# Patient Record
Sex: MALE | Race: OTHER | NOT HISPANIC OR LATINO | URBAN - METROPOLITAN AREA
[De-identification: names, ages, dates, MRNs, and addresses within clinical notes are randomized per-mention and may not be internally consistent; named-entity substitution may affect disease eponyms.]

---

## 2023-10-26 ENCOUNTER — EMERGENCY (EMERGENCY)
Facility: HOSPITAL | Age: 38
LOS: 1 days | Discharge: HOME | End: 2023-10-26
Admitting: EMERGENCY MEDICINE
Payer: MEDICAID

## 2023-10-26 VITALS
HEART RATE: 68 BPM | TEMPERATURE: 99 F | RESPIRATION RATE: 16 BRPM | SYSTOLIC BLOOD PRESSURE: 129 MMHG | OXYGEN SATURATION: 100 % | DIASTOLIC BLOOD PRESSURE: 91 MMHG

## 2023-10-26 VITALS
OXYGEN SATURATION: 97 % | TEMPERATURE: 99 F | HEART RATE: 73 BPM | SYSTOLIC BLOOD PRESSURE: 128 MMHG | DIASTOLIC BLOOD PRESSURE: 89 MMHG | RESPIRATION RATE: 16 BRPM

## 2023-10-26 DIAGNOSIS — Z94.0 KIDNEY TRANSPLANT STATUS: Chronic | ICD-10-CM

## 2023-10-26 LAB
ALBUMIN SERPL ELPH-MCNC: 4.1 G/DL — SIGNIFICANT CHANGE UP (ref 3.3–5)
ALBUMIN SERPL ELPH-MCNC: 4.1 G/DL — SIGNIFICANT CHANGE UP (ref 3.3–5)
ALP SERPL-CCNC: 87 U/L — SIGNIFICANT CHANGE UP (ref 40–120)
ALP SERPL-CCNC: 87 U/L — SIGNIFICANT CHANGE UP (ref 40–120)
ALT FLD-CCNC: 240 U/L — HIGH (ref 4–41)
ALT FLD-CCNC: 240 U/L — HIGH (ref 4–41)
ANION GAP SERPL CALC-SCNC: 10 MMOL/L — SIGNIFICANT CHANGE UP (ref 7–14)
ANION GAP SERPL CALC-SCNC: 10 MMOL/L — SIGNIFICANT CHANGE UP (ref 7–14)
AST SERPL-CCNC: 113 U/L — HIGH (ref 4–40)
AST SERPL-CCNC: 113 U/L — HIGH (ref 4–40)
BASE EXCESS BLDV CALC-SCNC: -0.5 MMOL/L — SIGNIFICANT CHANGE UP (ref -2–3)
BASE EXCESS BLDV CALC-SCNC: -0.5 MMOL/L — SIGNIFICANT CHANGE UP (ref -2–3)
BASOPHILS # BLD AUTO: 0.01 K/UL — SIGNIFICANT CHANGE UP (ref 0–0.2)
BASOPHILS # BLD AUTO: 0.01 K/UL — SIGNIFICANT CHANGE UP (ref 0–0.2)
BASOPHILS NFR BLD AUTO: 0.2 % — SIGNIFICANT CHANGE UP (ref 0–2)
BASOPHILS NFR BLD AUTO: 0.2 % — SIGNIFICANT CHANGE UP (ref 0–2)
BILIRUB SERPL-MCNC: 0.5 MG/DL — SIGNIFICANT CHANGE UP (ref 0.2–1.2)
BILIRUB SERPL-MCNC: 0.5 MG/DL — SIGNIFICANT CHANGE UP (ref 0.2–1.2)
BLOOD GAS VENOUS COMPREHENSIVE RESULT: SIGNIFICANT CHANGE UP
BLOOD GAS VENOUS COMPREHENSIVE RESULT: SIGNIFICANT CHANGE UP
BUN SERPL-MCNC: 28 MG/DL — HIGH (ref 7–23)
BUN SERPL-MCNC: 28 MG/DL — HIGH (ref 7–23)
CALCIUM SERPL-MCNC: 10.7 MG/DL — HIGH (ref 8.4–10.5)
CALCIUM SERPL-MCNC: 10.7 MG/DL — HIGH (ref 8.4–10.5)
CHLORIDE BLDV-SCNC: 102 MMOL/L — SIGNIFICANT CHANGE UP (ref 96–108)
CHLORIDE BLDV-SCNC: 102 MMOL/L — SIGNIFICANT CHANGE UP (ref 96–108)
CHLORIDE SERPL-SCNC: 104 MMOL/L — SIGNIFICANT CHANGE UP (ref 98–107)
CHLORIDE SERPL-SCNC: 104 MMOL/L — SIGNIFICANT CHANGE UP (ref 98–107)
CO2 BLDV-SCNC: 26.8 MMOL/L — HIGH (ref 22–26)
CO2 BLDV-SCNC: 26.8 MMOL/L — HIGH (ref 22–26)
CO2 SERPL-SCNC: 23 MMOL/L — SIGNIFICANT CHANGE UP (ref 22–31)
CO2 SERPL-SCNC: 23 MMOL/L — SIGNIFICANT CHANGE UP (ref 22–31)
CREAT SERPL-MCNC: 0.98 MG/DL — SIGNIFICANT CHANGE UP (ref 0.5–1.3)
CREAT SERPL-MCNC: 0.98 MG/DL — SIGNIFICANT CHANGE UP (ref 0.5–1.3)
EGFR: 102 ML/MIN/1.73M2 — SIGNIFICANT CHANGE UP
EGFR: 102 ML/MIN/1.73M2 — SIGNIFICANT CHANGE UP
EOSINOPHIL # BLD AUTO: 0.03 K/UL — SIGNIFICANT CHANGE UP (ref 0–0.5)
EOSINOPHIL # BLD AUTO: 0.03 K/UL — SIGNIFICANT CHANGE UP (ref 0–0.5)
EOSINOPHIL NFR BLD AUTO: 0.5 % — SIGNIFICANT CHANGE UP (ref 0–6)
EOSINOPHIL NFR BLD AUTO: 0.5 % — SIGNIFICANT CHANGE UP (ref 0–6)
GAS PNL BLDV: 135 MMOL/L — LOW (ref 136–145)
GAS PNL BLDV: 135 MMOL/L — LOW (ref 136–145)
GLUCOSE BLDV-MCNC: 256 MG/DL — HIGH (ref 70–99)
GLUCOSE BLDV-MCNC: 256 MG/DL — HIGH (ref 70–99)
GLUCOSE SERPL-MCNC: 260 MG/DL — HIGH (ref 70–99)
GLUCOSE SERPL-MCNC: 260 MG/DL — HIGH (ref 70–99)
HCO3 BLDV-SCNC: 25 MMOL/L — SIGNIFICANT CHANGE UP (ref 22–29)
HCO3 BLDV-SCNC: 25 MMOL/L — SIGNIFICANT CHANGE UP (ref 22–29)
HCT VFR BLD CALC: 45.7 % — SIGNIFICANT CHANGE UP (ref 39–50)
HCT VFR BLD CALC: 45.7 % — SIGNIFICANT CHANGE UP (ref 39–50)
HCT VFR BLDA CALC: 47 % — SIGNIFICANT CHANGE UP (ref 39–51)
HCT VFR BLDA CALC: 47 % — SIGNIFICANT CHANGE UP (ref 39–51)
HGB BLD CALC-MCNC: 15.5 G/DL — SIGNIFICANT CHANGE UP (ref 12.6–17.4)
HGB BLD CALC-MCNC: 15.5 G/DL — SIGNIFICANT CHANGE UP (ref 12.6–17.4)
HGB BLD-MCNC: 15.3 G/DL — SIGNIFICANT CHANGE UP (ref 13–17)
HGB BLD-MCNC: 15.3 G/DL — SIGNIFICANT CHANGE UP (ref 13–17)
IANC: 3.64 K/UL — SIGNIFICANT CHANGE UP (ref 1.8–7.4)
IANC: 3.64 K/UL — SIGNIFICANT CHANGE UP (ref 1.8–7.4)
IMM GRANULOCYTES NFR BLD AUTO: 0.5 % — SIGNIFICANT CHANGE UP (ref 0–0.9)
IMM GRANULOCYTES NFR BLD AUTO: 0.5 % — SIGNIFICANT CHANGE UP (ref 0–0.9)
LACTATE BLDV-MCNC: 1.4 MMOL/L — SIGNIFICANT CHANGE UP (ref 0.5–2)
LACTATE BLDV-MCNC: 1.4 MMOL/L — SIGNIFICANT CHANGE UP (ref 0.5–2)
LYMPHOCYTES # BLD AUTO: 1.14 K/UL — SIGNIFICANT CHANGE UP (ref 1–3.3)
LYMPHOCYTES # BLD AUTO: 1.14 K/UL — SIGNIFICANT CHANGE UP (ref 1–3.3)
LYMPHOCYTES # BLD AUTO: 20.1 % — SIGNIFICANT CHANGE UP (ref 13–44)
LYMPHOCYTES # BLD AUTO: 20.1 % — SIGNIFICANT CHANGE UP (ref 13–44)
MCHC RBC-ENTMCNC: 31.7 PG — SIGNIFICANT CHANGE UP (ref 27–34)
MCHC RBC-ENTMCNC: 31.7 PG — SIGNIFICANT CHANGE UP (ref 27–34)
MCHC RBC-ENTMCNC: 33.5 GM/DL — SIGNIFICANT CHANGE UP (ref 32–36)
MCHC RBC-ENTMCNC: 33.5 GM/DL — SIGNIFICANT CHANGE UP (ref 32–36)
MCV RBC AUTO: 94.8 FL — SIGNIFICANT CHANGE UP (ref 80–100)
MCV RBC AUTO: 94.8 FL — SIGNIFICANT CHANGE UP (ref 80–100)
MONOCYTES # BLD AUTO: 0.82 K/UL — SIGNIFICANT CHANGE UP (ref 0–0.9)
MONOCYTES # BLD AUTO: 0.82 K/UL — SIGNIFICANT CHANGE UP (ref 0–0.9)
MONOCYTES NFR BLD AUTO: 14.5 % — HIGH (ref 2–14)
MONOCYTES NFR BLD AUTO: 14.5 % — HIGH (ref 2–14)
NEUTROPHILS # BLD AUTO: 3.64 K/UL — SIGNIFICANT CHANGE UP (ref 1.8–7.4)
NEUTROPHILS # BLD AUTO: 3.64 K/UL — SIGNIFICANT CHANGE UP (ref 1.8–7.4)
NEUTROPHILS NFR BLD AUTO: 64.2 % — SIGNIFICANT CHANGE UP (ref 43–77)
NEUTROPHILS NFR BLD AUTO: 64.2 % — SIGNIFICANT CHANGE UP (ref 43–77)
NRBC # BLD: 0 /100 WBCS — SIGNIFICANT CHANGE UP (ref 0–0)
NRBC # BLD: 0 /100 WBCS — SIGNIFICANT CHANGE UP (ref 0–0)
NRBC # FLD: 0 K/UL — SIGNIFICANT CHANGE UP (ref 0–0)
NRBC # FLD: 0 K/UL — SIGNIFICANT CHANGE UP (ref 0–0)
PCO2 BLDV: 45 MMHG — SIGNIFICANT CHANGE UP (ref 42–55)
PCO2 BLDV: 45 MMHG — SIGNIFICANT CHANGE UP (ref 42–55)
PH BLDV: 7.36 — SIGNIFICANT CHANGE UP (ref 7.32–7.43)
PH BLDV: 7.36 — SIGNIFICANT CHANGE UP (ref 7.32–7.43)
PLATELET # BLD AUTO: 141 K/UL — LOW (ref 150–400)
PLATELET # BLD AUTO: 141 K/UL — LOW (ref 150–400)
PO2 BLDV: 43 MMHG — SIGNIFICANT CHANGE UP (ref 25–45)
PO2 BLDV: 43 MMHG — SIGNIFICANT CHANGE UP (ref 25–45)
POTASSIUM BLDV-SCNC: 4.2 MMOL/L — SIGNIFICANT CHANGE UP (ref 3.5–5.1)
POTASSIUM BLDV-SCNC: 4.2 MMOL/L — SIGNIFICANT CHANGE UP (ref 3.5–5.1)
POTASSIUM SERPL-MCNC: 4 MMOL/L — SIGNIFICANT CHANGE UP (ref 3.5–5.3)
POTASSIUM SERPL-MCNC: 4 MMOL/L — SIGNIFICANT CHANGE UP (ref 3.5–5.3)
POTASSIUM SERPL-SCNC: 4 MMOL/L — SIGNIFICANT CHANGE UP (ref 3.5–5.3)
POTASSIUM SERPL-SCNC: 4 MMOL/L — SIGNIFICANT CHANGE UP (ref 3.5–5.3)
PROT SERPL-MCNC: 7 G/DL — SIGNIFICANT CHANGE UP (ref 6–8.3)
PROT SERPL-MCNC: 7 G/DL — SIGNIFICANT CHANGE UP (ref 6–8.3)
RBC # BLD: 4.82 M/UL — SIGNIFICANT CHANGE UP (ref 4.2–5.8)
RBC # BLD: 4.82 M/UL — SIGNIFICANT CHANGE UP (ref 4.2–5.8)
RBC # FLD: 12.9 % — SIGNIFICANT CHANGE UP (ref 10.3–14.5)
RBC # FLD: 12.9 % — SIGNIFICANT CHANGE UP (ref 10.3–14.5)
SAO2 % BLDV: 74 % — SIGNIFICANT CHANGE UP (ref 67–88)
SAO2 % BLDV: 74 % — SIGNIFICANT CHANGE UP (ref 67–88)
SODIUM SERPL-SCNC: 137 MMOL/L — SIGNIFICANT CHANGE UP (ref 135–145)
SODIUM SERPL-SCNC: 137 MMOL/L — SIGNIFICANT CHANGE UP (ref 135–145)
WBC # BLD: 5.67 K/UL — SIGNIFICANT CHANGE UP (ref 3.8–10.5)
WBC # BLD: 5.67 K/UL — SIGNIFICANT CHANGE UP (ref 3.8–10.5)
WBC # FLD AUTO: 5.67 K/UL — SIGNIFICANT CHANGE UP (ref 3.8–10.5)
WBC # FLD AUTO: 5.67 K/UL — SIGNIFICANT CHANGE UP (ref 3.8–10.5)

## 2023-10-26 PROCEDURE — 73562 X-RAY EXAM OF KNEE 3: CPT | Mod: 26,LT

## 2023-10-26 PROCEDURE — 99284 EMERGENCY DEPT VISIT MOD MDM: CPT

## 2023-10-26 RX ORDER — CEPHALEXIN 500 MG
500 CAPSULE ORAL EVERY 12 HOURS
Refills: 0 | Status: DISCONTINUED | OUTPATIENT
Start: 2023-10-26 | End: 2023-10-30

## 2023-10-26 RX ORDER — CEPHALEXIN 500 MG
1 CAPSULE ORAL
Qty: 14 | Refills: 0
Start: 2023-10-26 | End: 2023-11-01

## 2023-10-26 RX ADMIN — Medication 500 MILLIGRAM(S): at 18:15

## 2023-10-26 NOTE — ED PROVIDER NOTE - CLINICAL SUMMARY MEDICAL DECISION MAKING FREE TEXT BOX
39 y/o M with pmhx of DM (on insulin glulisine), ESRD s/p kidney transplant 3 years ago who presents to the ED c/o atraumatic R knee pain x 4 days. Pt reports gradual onset worsening knee pain that is alleviated with rest and aggravated with movement and walking. Pt tried taking tylenol for pain control with little relief of pain. Denies fever/chills, recent trauma or falls, extremity numbness/tingling, similar episodes of pain in the past, difficulty with ambulation or movement of the knee, pain in any other joints, rash.    Patient currently afebrile, hemodynamically stable, spO2 100%. Physical exam findings: Inferior aspect of R knee with tenderness, overlying erythema, warmth and swelling. Full active and passive ROM. Neurovascularly intact. Otherwise unremarkable. FS noted to be 384 on arrival, pt states he ate just prior to coming to the ED and took his glulisine insulin while waiting in the ED.  Based on history and physical, differentials include but are not limited to gout vs knee effusion vs OA. Plan to assess patient for acute pathology as listed above with XR. Repeat FS to ensure downtrending glucose level. Will administer medications for symptomatic relief, follow-up on results, and reassess. 39 y/o M with pmhx of DM (on insulin glulisine), ESRD s/p kidney transplant 3 years ago who presents to the ED c/o atraumatic R knee pain x 4 days. Pt reports gradual onset worsening knee pain that is alleviated with rest and aggravated with movement and walking. Pt tried taking tylenol for pain control with little relief of pain. Denies fever/chills, recent trauma or falls, extremity numbness/tingling, similar episodes of pain in the past, difficulty with ambulation or movement of the knee, pain in any other joints, rash.    Patient currently afebrile, hemodynamically stable, spO2 100%. Physical exam findings: Inferior aspect of R knee with tenderness, overlying erythema, warmth and swelling. Full active and passive ROM. Neurovascularly intact. Otherwise unremarkable. FS noted to be 384 on arrival, pt states he ate just prior to coming to the ED and took his glulisine insulin while waiting in the ED.  Based on history and physical, differentials include but are not limited to gout vs cellulitis vs knee effusion vs OA. Plan to assess patient for acute pathology as listed above with XR, labs given hx of kidney ds and elevated FS. Will administer medications for symptomatic relief, follow-up on results, and reassess. Consider abx for cellulitis ddx. 39 y/o M with pmhx of DM (on insulin glulisine), ESRD s/p kidney transplant 3 years ago who presents to the ED c/o atraumatic R knee pain x 4 days. Pt reports gradual onset worsening knee pain that is alleviated with rest and aggravated with movement and walking. Pt tried taking tylenol for pain control with little relief of pain. Denies fever/chills, recent trauma or falls, extremity numbness/tingling, similar episodes of pain in the past, difficulty with ambulation or movement of the knee, pain in any other joints, rash.    Patient currently afebrile, hemodynamically stable, spO2 100%. Physical exam findings: Inferior aspect of R knee with tenderness, overlying erythema, warmth and swelling. Full active and passive ROM. Neurovascularly intact. Otherwise unremarkable. FS noted to be 384 on arrival, pt states he ate just prior to coming to the ED and took his glulisine insulin while waiting in the ED.  Based on history and physical, differentials include but are not limited to gout vs cellulitis vs knee effusion vs OA. Plan to assess patient for acute pathology as listed above with XR, labs given hx of kidney ds and elevated FS. Will administer medications for symptomatic relief, follow-up on results, and reassess. Consider abx for cellulitis ddx.    SARANYA Lora:  I have personally seen and examined this patient.  I have fully participated in the care of this patient. I have reviewed all pertinent clinical information, including history, physical exam, plan and PA fellow's note and agree as noted.

## 2023-10-26 NOTE — ED PROVIDER NOTE - NSFOLLOWUPINSTRUCTIONS_ED_ALL_ED_FT
You were seen in the ED for knee pain. Your symptoms are most likely related to cellulitis, an infection of the skin.     Please take Keflex (cephalexin) one tablet twice a day for the next 7 days. You received one dose in the ED. Please start taking tomorrow. Take Tylenol 650mg (Two 325 mg pills) every 4-6 hours as needed for pain or fevers.     Please follow-up with your primary care doctor - bring copies of your results from today's visit.     Continue all previously prescribed medications as directed unless otherwise instructed.      Return to the ER for worsening or persistent symptoms, and/or ANY NEW OR CONCERNING SYMPTOMS including fever/chills, worsening pain or swelling in the knee, an inability to walk, numbness or tingling, nausea/vomiting.     Cellulitis  WHAT YOU NEED TO KNOW:  Cellulitis is a skin infection caused by bacteria. Cellulitis may go away on its own or you may need treatment. Your healthcare provider may draw a Jackson around the outside edges of your cellulitis. If your cellulitis spreads, your healthcare provider will see it outside of the Jackson.   Cellulitis    DISCHARGE INSTRUCTIONS:  Call 911 if:   •You have sudden trouble breathing or chest pain.  Seek care immediately if:   •Your wound gets larger and more painful.   •You feel a crackling under your skin when you touch it.  •You have purple dots or bumps on your skin, or you see bleeding under your skin.  •You have new swelling and pain in your legs.  •The red, warm, swollen area gets larger.  •You see red streaks coming from the infected area.  Contact your healthcare provider if:   •You have a fever.  •Your fever or pain does not go away or gets worse.  •The area does not get smaller after 2 days of antibiotics.  •Your skin is flaking or peeling off.  •You have questions or concerns about your condition or care.  Medicines:   •Antibiotics help treat the bacterial infection.   •NSAIDs, such as ibuprofen, help decrease swelling, pain, and fever. NSAIDs can cause stomach bleeding or kidney problems in certain people. If you take blood thinner medicine, always ask if NSAIDs are safe for you. Always read the medicine label and follow directions. Do not give these medicines to children under 6 months of age without direction from your child's healthcare provider.  •Acetaminophen decreases pain and fever. It is available without a doctor's order. Ask how much to take and how often to take it. Follow directions. Read the labels of all other medicines you are using to see if they also contain acetaminophen, or ask your doctor or pharmacist. Acetaminophen can cause liver damage if not taken correctly. Do not use more than 4 grams (4,000 milligrams) total of acetaminophen in one day.   •Take your medicine as directed. Contact your healthcare provider if you think your medicine is not helping or if you have side effects. Tell him or her if you are allergic to any medicine. Keep a list of the medicines, vitamins, and herbs you take. Include the amounts, and when and why you take them. Bring the list or the pill bottles to follow-up visits. Carry your medicine list with you in case of an emergency.  Self-care:   •Elevate the area above the level of your heart as often as you can. This will help decrease swelling and pain. Prop the area on pillows or blankets to keep it elevated comfortably.   •Clean the area daily until the wound scabs over. Gently wash the area with soap and water. Pat dry. Use dressings as directed.   •Place cool or warm, wet cloths on the area as directed. Use clean cloths and clean water. Leave it on the area until the cloth is room temperature. Pat the area dry with a clean, dry cloth. The cloths may help decrease pain.   Prevent cellulitis:   •Do not scratch bug bites or areas of injury. You increase your risk for cellulitis by scratching these areas.   •Do not share personal items, such as towels, clothing, and razors.   •Clean exercise equipment with germ-killing  before and after you use it.  •Wash your hands often. Use soap and water. Wash your hands after you use the bathroom, change a child's diapers, or sneeze. Wash your hands before you prepare or eat food. Use lotion to prevent dry, cracked skin.   Handwashing   •Wear pressure stockings as directed. You may be told to wear the stockings if you have peripheral edema. The stockings improve blood flow and decrease swelling.  •Treat athlete’s foot. This can help prevent the spread of a bacterial skin infection.  Follow up with your healthcare provider within 3 days, or as directed: Your healthcare provider will check if your cellulitis is getting better. You may need different medicine. Write down your questions so you remember to ask them during your visits.  LO QUE NECESITA SABER:  La celulitis es shahid infección en la piel causada por bacteria. La celulitis podría desaparecer por sí sachi o puede que necesite tratamiento. Frank médico puede dibujar un círculo alrededor de los bordes externos de frank celulitis. Si la celulitis se extiende, frakn médico verá que se salió del círculo.   Celulitis   [facility]STRUCCIONES SOBRE [facility]:  Llame al 911 si:  •Tiene dolor en el pecho o dificultad repentina para respirar.  Busque atención médica de inmediato si:  •Frank herida se engrandece o tiene más dolor.  •Usted siente sonidos crepitantes bajo la piel al tocarla.  •Usted tiene puntos o protuberancias color ramo en frank piel o nota lesa debajo frank piel.  •Usted tiene shahid nueva inflamación o dolor en dian piernas.  •Las áreas enrojecidas, cálidas e inflamadas se hacen más grandes.  •Usted ve líneas vela saliendo del área infectada.  Comuníquese con frank médico si:  •Tiene fiebre.  •Frank fiebre o dolor no desaparecen o empeoran.  •El área no se reduce después de 2 días de uso de antibióticos.  •Frank piel se escama o se pela.  •Usted tiene preguntas o inquietudes acerca de frank condición o   cuidado.Medicamentos:  •Los antibióticossirven para tratar la infección bacterial.  •Los SANJAY,chika el ibuprofeno, ayudan a disminuir la inflamación, el dolor y la fiebre. Los SANJAY pueden causar sangrado estomacal o problemas renales en ciertas personas. Si usted esta tomando un anticoágulante,  siempre pregunte si los AINEs son seguros para usted. Siempre yadira la etiqueta de jeaneth medicamento y siga las instrucciones. No administre jeaneth medicamento a niños menores de 6 meses de alba sin antes obtener la autorización de frank médico.  •Acetaminofénalivia el dolor y baja la fiebre. Está disponible sin receta médica. Pregunte la cantidad y la frecuencia con que debe tomarlos. Siga las indicaciones. Yadira las etiquetas de todos los demás medicamentos que esté usando para saber si también contienen acetaminofén, o pregunte a frank médico o farmacéutico. El acetaminofén puede causar daño en el hígado cuando no se zeynep de forma correcta. No use más de 4 gramos (4000 miligramos) en total de acetaminofeno en un día.  •Spray dian medicamentos chika se le haya indicado.Consulte con frank médico si usted alexandre que frank medicamento no le está ayudando o si presenta efectos secundarios. Infórmele si es alérgico a cualquier medicamento. Mantenga shahid lista actualizada de los medicamentos, las vitaminas y los productos herbales que zeynep. Incluya los siguientes datos de los medicamentos: cantidad, frecuencia y motivo de administración. Traiga con usted la lista o los envases de las píldoras a dian citas de seguimiento. Lleve la lista de los medicamentos con usted en kirstie de shahid emergencia.  Cuidados personales:  •Eleve el área por encima del nivel de frank corazón.con la frecuencia posible. Lignite va a disminuir inflamación y el dolor. Coloque el área sobre almohadas o sábanas para tratar de mantenerla elevada cómodamente.  •Limpie la tristen diariamente hasta que se forme shahid costra sobre la herida.Lave suavemente el área con jabón y agua. Séquela con palmaditas. Use apósitos chika se le haya indicado.  •Coloque paños húmedos fríos o calientes en la tristen chika se le haya indicado.Use paños limpios y agua limpia. Déjelos en el área hasta que el paño llegue a temperatura ambiente. Seque el área con palmaditas con un paño limpio y seco. Los paños pueden ayudar a disminuir el dolor.  Evite la celulitis:  •No se rasque picaduras de insectos o áreas lesionadas.Rascarse estas áreas aumenta frank riesgo de tener celulitis.  •No comparta los artículos de uso personal,chika toallas, ropa, o navajas de afeitar.  •Limpie los equipos de ejerciciocon un detergente desinfectante antes y después de utilizarlo.  •Lávese las brii frecuentemente.Utilice agua y jabón. Lávese las brii después de usar el baño, cambiarle el pañal a un ciara o estornudar. Lávese las brii antes de comer o preparar alimentos. Use shahid loción para evitar que la piel se reseque o se agriete.   Lavado de brii   •Use medias de compresión chika se le indique.Es posible que le recomienden usar las medias si tiene edema periférico. Las medias mejoran el flujo sanguíneo y disminuyen la hinchazón.  •Trate el pie de atleta.Lignite puede ayudar a prevenir la propagación de shahid infección bacteriana en la piel.  Acuda a dian consultas de control con frank médico dentro de 3 días o según le indicaron:Frank médico comprobará si la celulitis está mejorando. Es posible que necesite un medicamento diferente. Anote dian preguntas para que se acuerde de hacerlas bryant dian visitas.

## 2023-10-26 NOTE — ED ADULT NURSE NOTE - OBJECTIVE STATEMENT
pt to wellness, a&ox3 and ambulatory at baseline, c/o R knee pain x4 days, worsening with movement. pt reporting he popped a pimple in area just prior to onset of symptoms. pt denies fevers, chills, n/v/d, chest pain, truama/injury to the area, recent travel, recent infection, sob. NAD noted at this time, respirations even and nonlabored on room air. comfort and safety maintained. labs drawn and sent. pending XR.

## 2023-10-26 NOTE — ED PROVIDER NOTE - PROGRESS NOTE DETAILS
SARANYA Harrison: Workup reviewed. CBC without leukocytosis. CMP with elevated LFTs, however in absence of abd pain, N/V, signs of jaundice, will refer pt to f/u with PCP for further evaluation. SARANYA Harrison: Workup reviewed. CBC without leukocytosis. CMP with elevated LFTs, however in absence of abd pain, N/V, signs of jaundice, will refer pt to f/u with PCP for further evaluation.  XR L knee with trace joint effusion, otherwise unremarkable.   Will give Keflex, observe for one hour after given hx of pcn allergy. If no adverse reaction will d/c with 5 days course of cephalexin and f/u with PCP.

## 2023-10-26 NOTE — ED ADULT TRIAGE NOTE - CHIEF COMPLAINT QUOTE
pt ambulatory c/o right knee pain X 4 days. denies any falls/injuries. past medical history of kidney transplant X 3 years and diabetes. fingerstick 384.

## 2023-10-26 NOTE — ED PROVIDER NOTE - OBJECTIVE STATEMENT
39 y/o M with pmhx of DM (on insulin glulisine), ESRD s/p kidney transplant 3 years ago who presents to the ED c/o R knee pain x 4 days. Pt reports gradual onset worsening knee pain that is alleviated with rest and aggravated with movement and walking. Pt tried taking tylenol for pain control with little relief of pain. Denies fever/chills, recent trauma or falls, extremity numbness/tingling, similar episodes of pain in the past, difficulty with ambulation or movement of the knee, pain in any other joints, rash.  Reports pcn allergy. Denies smoking/etoh/drug use. 37 y/o M with pmhx of DM (on insulin glulisine), ESRD s/p kidney transplant 3 years ago who presents to the ED c/o R knee pain x 4 days. Pt reports gradual onset worsening knee pain that is alleviated with rest and aggravated with movement and walking. Pt tried taking tylenol for pain control with little relief of pain. Pt notes there was a pimple in the area of pain that he popped just prior to sx onset. Denies fever/chills, recent trauma or falls, extremity numbness/tingling, similar episodes of pain in the past, difficulty with ambulation or movement of the knee, pain in any other joints, rash.  Reports pcn allergy. Denies smoking/etoh/drug use.

## 2023-10-26 NOTE — ED PROVIDER NOTE - PHYSICAL EXAMINATION
General: Well appearing in no acute distress, alert and cooperative  Head: Normocephalic, atraumatic  Eyes: PERRLA, no conjunctival injection, no scleral icterus  Neck: Soft and supple  Cardiac: Regular rate and regular rhythm, no murmurs, peripheral pulses 2+ and symmetric in all extremities, no LE edema. No calf tenderness or palpable cords b/l  Resp: Unlabored respiratory effort, lungs CTAB, speaking in full sentences, no wheezes  Abd: Soft, non-tender, non-distended, no guarding or rebound tenderness  MSK: +tenderness to inferior aspect of R knee with overlying erythema, warmth and swelling, no overlying breaks in skin. Full active and passive ROM of R knee. All other extremities without tenderness or obvious deformities.  Skin: Warm and dry, no rashes/abrasions/lacerations except as noted in MSK exam.   Neuro: AO x 3, moves all extremities symmetrically, Motor strength 5/5 bilaterally UE and LE, sensation grossly intact. Normal gait.

## 2023-10-26 NOTE — ED PROVIDER NOTE - PATIENT PORTAL LINK FT
You can access the FollowMyHealth Patient Portal offered by [facility] by registering at the following website: [email or web address]. By joining [facility]’s FollowMyHealth portal, you will also be able to view your health information using other applications (apps) compatible with our system.

## 2024-02-10 ENCOUNTER — INPATIENT (INPATIENT)
Facility: HOSPITAL | Age: 39
LOS: 3 days | Discharge: HOME | End: 2024-02-14
Attending: INTERNAL MEDICINE | Admitting: INTERNAL MEDICINE
Payer: MEDICAID

## 2024-02-10 VITALS
SYSTOLIC BLOOD PRESSURE: 102 MMHG | DIASTOLIC BLOOD PRESSURE: 65 MMHG | HEART RATE: 98 BPM | TEMPERATURE: 99 F | OXYGEN SATURATION: 97 % | RESPIRATION RATE: 16 BRPM

## 2024-02-10 DIAGNOSIS — I10 ESSENTIAL (PRIMARY) HYPERTENSION: ICD-10-CM

## 2024-02-10 DIAGNOSIS — Z94.0 KIDNEY TRANSPLANT STATUS: ICD-10-CM

## 2024-02-10 DIAGNOSIS — E11.10 TYPE 2 DIABETES MELLITUS WITH KETOACIDOSIS WITHOUT COMA: ICD-10-CM

## 2024-02-10 DIAGNOSIS — E78.5 HYPERLIPIDEMIA, UNSPECIFIED: ICD-10-CM

## 2024-02-10 DIAGNOSIS — D84.9 IMMUNODEFICIENCY, UNSPECIFIED: ICD-10-CM

## 2024-02-10 DIAGNOSIS — E11.65 TYPE 2 DIABETES MELLITUS WITH HYPERGLYCEMIA: ICD-10-CM

## 2024-02-10 DIAGNOSIS — Z94.0 KIDNEY TRANSPLANT STATUS: Chronic | ICD-10-CM

## 2024-02-10 PROBLEM — E11.9 TYPE 2 DIABETES MELLITUS WITHOUT COMPLICATIONS: Chronic | Status: ACTIVE | Noted: 2023-10-26

## 2024-02-10 LAB
ALBUMIN SERPL ELPH-MCNC: 3 G/DL — LOW (ref 3.3–5)
ALBUMIN SERPL ELPH-MCNC: 3.1 G/DL — LOW (ref 3.3–5)
ALBUMIN SERPL ELPH-MCNC: 3.1 G/DL — LOW (ref 3.3–5)
ALBUMIN SERPL ELPH-MCNC: 3.6 G/DL — SIGNIFICANT CHANGE UP (ref 3.3–5)
ALP SERPL-CCNC: 108 U/L — SIGNIFICANT CHANGE UP (ref 40–120)
ALP SERPL-CCNC: 78 U/L — SIGNIFICANT CHANGE UP (ref 40–120)
ALP SERPL-CCNC: 82 U/L — SIGNIFICANT CHANGE UP (ref 40–120)
ALP SERPL-CCNC: 83 U/L — SIGNIFICANT CHANGE UP (ref 40–120)
ALT FLD-CCNC: 139 U/L — HIGH (ref 4–41)
ALT FLD-CCNC: 141 U/L — HIGH (ref 4–41)
ALT FLD-CCNC: 142 U/L — HIGH (ref 4–41)
ALT FLD-CCNC: 171 U/L — HIGH (ref 4–41)
ANION GAP SERPL CALC-SCNC: 10 MMOL/L — SIGNIFICANT CHANGE UP (ref 7–14)
ANION GAP SERPL CALC-SCNC: 17 MMOL/L — HIGH (ref 7–14)
ANION GAP SERPL CALC-SCNC: 19 MMOL/L — HIGH (ref 7–14)
ANION GAP SERPL CALC-SCNC: 28 MMOL/L — HIGH (ref 7–14)
APPEARANCE UR: CLEAR — SIGNIFICANT CHANGE UP
APTT BLD: 23.6 SEC — LOW (ref 24.5–35.6)
APTT BLD: 27 SEC — SIGNIFICANT CHANGE UP (ref 24.5–35.6)
APTT BLD: 28.3 SEC — SIGNIFICANT CHANGE UP (ref 24.5–35.6)
AST SERPL-CCNC: 56 U/L — HIGH (ref 4–40)
AST SERPL-CCNC: 63 U/L — HIGH (ref 4–40)
AST SERPL-CCNC: 67 U/L — HIGH (ref 4–40)
AST SERPL-CCNC: 73 U/L — HIGH (ref 4–40)
AT III ACT/NOR PPP CHRO: 68 % — LOW (ref 85–135)
B-OH-BUTYR SERPL-SCNC: 3.1 MMOL/L — HIGH (ref 0–0.4)
BACTERIA # UR AUTO: NEGATIVE /HPF — SIGNIFICANT CHANGE UP
BASE EXCESS BLDV CALC-SCNC: -14.1 MMOL/L — LOW (ref -2–3)
BASE EXCESS BLDV CALC-SCNC: -3.7 MMOL/L — LOW (ref -2–3)
BASOPHILS # BLD AUTO: 0.01 K/UL — SIGNIFICANT CHANGE UP (ref 0–0.2)
BASOPHILS # BLD AUTO: 0.02 K/UL — SIGNIFICANT CHANGE UP (ref 0–0.2)
BASOPHILS NFR BLD AUTO: 0.1 % — SIGNIFICANT CHANGE UP (ref 0–2)
BASOPHILS NFR BLD AUTO: 0.2 % — SIGNIFICANT CHANGE UP (ref 0–2)
BILIRUB SERPL-MCNC: 0.4 MG/DL — SIGNIFICANT CHANGE UP (ref 0.2–1.2)
BILIRUB SERPL-MCNC: 0.5 MG/DL — SIGNIFICANT CHANGE UP (ref 0.2–1.2)
BILIRUB SERPL-MCNC: 0.6 MG/DL — SIGNIFICANT CHANGE UP (ref 0.2–1.2)
BILIRUB SERPL-MCNC: 0.6 MG/DL — SIGNIFICANT CHANGE UP (ref 0.2–1.2)
BILIRUB UR-MCNC: NEGATIVE — SIGNIFICANT CHANGE UP
BLD GP AB SCN SERPL QL: NEGATIVE — SIGNIFICANT CHANGE UP
BLOOD GAS VENOUS COMPREHENSIVE RESULT: SIGNIFICANT CHANGE UP
BUN SERPL-MCNC: 10 MG/DL — SIGNIFICANT CHANGE UP (ref 7–23)
BUN SERPL-MCNC: 14 MG/DL — SIGNIFICANT CHANGE UP (ref 7–23)
BUN SERPL-MCNC: 18 MG/DL — SIGNIFICANT CHANGE UP (ref 7–23)
BUN SERPL-MCNC: 24 MG/DL — HIGH (ref 7–23)
CALCIUM SERPL-MCNC: 10.1 MG/DL — SIGNIFICANT CHANGE UP (ref 8.4–10.5)
CALCIUM SERPL-MCNC: 9.1 MG/DL — SIGNIFICANT CHANGE UP (ref 8.4–10.5)
CALCIUM SERPL-MCNC: 9.5 MG/DL — SIGNIFICANT CHANGE UP (ref 8.4–10.5)
CALCIUM SERPL-MCNC: 9.5 MG/DL — SIGNIFICANT CHANGE UP (ref 8.4–10.5)
CAST: 0 /LPF — SIGNIFICANT CHANGE UP (ref 0–4)
CHLORIDE BLDV-SCNC: 108 MMOL/L — SIGNIFICANT CHANGE UP (ref 96–108)
CHLORIDE BLDV-SCNC: 109 MMOL/L — HIGH (ref 96–108)
CHLORIDE SERPL-SCNC: 107 MMOL/L — SIGNIFICANT CHANGE UP (ref 98–107)
CHLORIDE SERPL-SCNC: 107 MMOL/L — SIGNIFICANT CHANGE UP (ref 98–107)
CHLORIDE SERPL-SCNC: 108 MMOL/L — HIGH (ref 98–107)
CHLORIDE SERPL-SCNC: 97 MMOL/L — LOW (ref 98–107)
CHOLEST SERPL-MCNC: 187 MG/DL — SIGNIFICANT CHANGE UP
CO2 BLDV-SCNC: 15.1 MMOL/L — LOW (ref 22–26)
CO2 BLDV-SCNC: 23.4 MMOL/L — SIGNIFICANT CHANGE UP (ref 22–26)
CO2 SERPL-SCNC: 13 MMOL/L — LOW (ref 22–31)
CO2 SERPL-SCNC: 13 MMOL/L — LOW (ref 22–31)
CO2 SERPL-SCNC: 20 MMOL/L — LOW (ref 22–31)
CO2 SERPL-SCNC: 8 MMOL/L — CRITICAL LOW (ref 22–31)
COLOR SPEC: YELLOW — SIGNIFICANT CHANGE UP
CREAT SERPL-MCNC: 0.68 MG/DL — SIGNIFICANT CHANGE UP (ref 0.5–1.3)
CREAT SERPL-MCNC: 0.73 MG/DL — SIGNIFICANT CHANGE UP (ref 0.5–1.3)
CREAT SERPL-MCNC: 0.74 MG/DL — SIGNIFICANT CHANGE UP (ref 0.5–1.3)
CREAT SERPL-MCNC: 1.09 MG/DL — SIGNIFICANT CHANGE UP (ref 0.5–1.3)
DIFF PNL FLD: NEGATIVE — SIGNIFICANT CHANGE UP
EGFR: 120 ML/MIN/1.73M2 — SIGNIFICANT CHANGE UP
EGFR: 120 ML/MIN/1.73M2 — SIGNIFICANT CHANGE UP
EGFR: 123 ML/MIN/1.73M2 — SIGNIFICANT CHANGE UP
EGFR: 90 ML/MIN/1.73M2 — SIGNIFICANT CHANGE UP
EOSINOPHIL # BLD AUTO: 0.02 K/UL — SIGNIFICANT CHANGE UP (ref 0–0.5)
EOSINOPHIL # BLD AUTO: 0.03 K/UL — SIGNIFICANT CHANGE UP (ref 0–0.5)
EOSINOPHIL NFR BLD AUTO: 0.2 % — SIGNIFICANT CHANGE UP (ref 0–6)
EOSINOPHIL NFR BLD AUTO: 0.4 % — SIGNIFICANT CHANGE UP (ref 0–6)
GAS PNL BLDV: 120 MMOL/L — CRITICAL LOW (ref 136–145)
GAS PNL BLDV: 134 MMOL/L — LOW (ref 136–145)
GLUCOSE BLDC GLUCOMTR-MCNC: 124 MG/DL — HIGH (ref 70–99)
GLUCOSE BLDC GLUCOMTR-MCNC: 131 MG/DL — HIGH (ref 70–99)
GLUCOSE BLDC GLUCOMTR-MCNC: 135 MG/DL — HIGH (ref 70–99)
GLUCOSE BLDC GLUCOMTR-MCNC: 136 MG/DL — HIGH (ref 70–99)
GLUCOSE BLDC GLUCOMTR-MCNC: 155 MG/DL — HIGH (ref 70–99)
GLUCOSE BLDC GLUCOMTR-MCNC: 157 MG/DL — HIGH (ref 70–99)
GLUCOSE BLDC GLUCOMTR-MCNC: 171 MG/DL — HIGH (ref 70–99)
GLUCOSE BLDC GLUCOMTR-MCNC: 186 MG/DL — HIGH (ref 70–99)
GLUCOSE BLDC GLUCOMTR-MCNC: 195 MG/DL — HIGH (ref 70–99)
GLUCOSE BLDC GLUCOMTR-MCNC: 214 MG/DL — HIGH (ref 70–99)
GLUCOSE BLDC GLUCOMTR-MCNC: 267 MG/DL — HIGH (ref 70–99)
GLUCOSE BLDV-MCNC: 125 MG/DL — HIGH (ref 70–99)
GLUCOSE BLDV-MCNC: 188 MG/DL — HIGH (ref 70–99)
GLUCOSE SERPL-MCNC: 123 MG/DL — HIGH (ref 70–99)
GLUCOSE SERPL-MCNC: 180 MG/DL — HIGH (ref 70–99)
GLUCOSE SERPL-MCNC: 201 MG/DL — HIGH (ref 70–99)
GLUCOSE SERPL-MCNC: 407 MG/DL — HIGH (ref 70–99)
GLUCOSE UR QL: >=1000 MG/DL
GRAM STN FLD: ABNORMAL
HCO3 BLDV-SCNC: 14 MMOL/L — LOW (ref 22–29)
HCO3 BLDV-SCNC: 22 MMOL/L — SIGNIFICANT CHANGE UP (ref 22–29)
HCT VFR BLD CALC: 36.9 % — LOW (ref 39–50)
HCT VFR BLD CALC: 41.6 % — SIGNIFICANT CHANGE UP (ref 39–50)
HCT VFR BLDA CALC: 41 % — SIGNIFICANT CHANGE UP (ref 39–51)
HCT VFR BLDA CALC: 41 % — SIGNIFICANT CHANGE UP (ref 39–51)
HDLC SERPL-MCNC: 24 MG/DL — LOW
HGB BLD CALC-MCNC: 13.5 G/DL — SIGNIFICANT CHANGE UP (ref 12.6–17.4)
HGB BLD CALC-MCNC: 13.6 G/DL — SIGNIFICANT CHANGE UP (ref 12.6–17.4)
HGB BLD-MCNC: 13 G/DL — SIGNIFICANT CHANGE UP (ref 13–17)
HGB BLD-MCNC: 14.9 G/DL — SIGNIFICANT CHANGE UP (ref 13–17)
IANC: 5.11 K/UL — SIGNIFICANT CHANGE UP (ref 1.8–7.4)
IANC: 7.93 K/UL — HIGH (ref 1.8–7.4)
IMM GRANULOCYTES NFR BLD AUTO: 0.6 % — SIGNIFICANT CHANGE UP (ref 0–0.9)
IMM GRANULOCYTES NFR BLD AUTO: 0.6 % — SIGNIFICANT CHANGE UP (ref 0–0.9)
INR BLD: 1.01 RATIO — SIGNIFICANT CHANGE UP (ref 0.85–1.18)
INR BLD: 1.03 RATIO — SIGNIFICANT CHANGE UP (ref 0.85–1.18)
INR BLD: 1.06 RATIO — SIGNIFICANT CHANGE UP (ref 0.85–1.18)
KETONES UR-MCNC: >=160 MG/DL
LACTATE BLDV-MCNC: 1.3 MMOL/L — SIGNIFICANT CHANGE UP (ref 0.5–2)
LACTATE BLDV-MCNC: 1.8 MMOL/L — SIGNIFICANT CHANGE UP (ref 0.5–2)
LEUKOCYTE ESTERASE UR-ACNC: NEGATIVE — SIGNIFICANT CHANGE UP
LIDOCAIN IGE QN: 115 U/L — HIGH (ref 7–60)
LIPID PNL WITH DIRECT LDL SERPL: 110 MG/DL — HIGH
LYMPHOCYTES # BLD AUTO: 0.79 K/UL — LOW (ref 1–3.3)
LYMPHOCYTES # BLD AUTO: 1.59 K/UL — SIGNIFICANT CHANGE UP (ref 1–3.3)
LYMPHOCYTES # BLD AUTO: 11.3 % — LOW (ref 13–44)
LYMPHOCYTES # BLD AUTO: 14.2 % — SIGNIFICANT CHANGE UP (ref 13–44)
MAGNESIUM SERPL-MCNC: 1.5 MG/DL — LOW (ref 1.6–2.6)
MAGNESIUM SERPL-MCNC: 1.9 MG/DL — SIGNIFICANT CHANGE UP (ref 1.6–2.6)
MCHC RBC-ENTMCNC: 33 PG — SIGNIFICANT CHANGE UP (ref 27–34)
MCHC RBC-ENTMCNC: 33.3 PG — SIGNIFICANT CHANGE UP (ref 27–34)
MCHC RBC-ENTMCNC: 35.2 GM/DL — SIGNIFICANT CHANGE UP (ref 32–36)
MCHC RBC-ENTMCNC: 35.8 GM/DL — SIGNIFICANT CHANGE UP (ref 32–36)
MCV RBC AUTO: 93.1 FL — SIGNIFICANT CHANGE UP (ref 80–100)
MCV RBC AUTO: 93.7 FL — SIGNIFICANT CHANGE UP (ref 80–100)
MONOCYTES # BLD AUTO: 1.03 K/UL — HIGH (ref 0–0.9)
MONOCYTES # BLD AUTO: 1.53 K/UL — HIGH (ref 0–0.9)
MONOCYTES NFR BLD AUTO: 13.7 % — SIGNIFICANT CHANGE UP (ref 2–14)
MONOCYTES NFR BLD AUTO: 14.7 % — HIGH (ref 2–14)
NEUTROPHILS # BLD AUTO: 5.11 K/UL — SIGNIFICANT CHANGE UP (ref 1.8–7.4)
NEUTROPHILS # BLD AUTO: 7.93 K/UL — HIGH (ref 1.8–7.4)
NEUTROPHILS NFR BLD AUTO: 71.1 % — SIGNIFICANT CHANGE UP (ref 43–77)
NEUTROPHILS NFR BLD AUTO: 72.9 % — SIGNIFICANT CHANGE UP (ref 43–77)
NITRITE UR-MCNC: NEGATIVE — SIGNIFICANT CHANGE UP
NON HDL CHOLESTEROL: 163 MG/DL — HIGH
NRBC # BLD: 0 /100 WBCS — SIGNIFICANT CHANGE UP (ref 0–0)
NRBC # BLD: 0 /100 WBCS — SIGNIFICANT CHANGE UP (ref 0–0)
NRBC # FLD: 0 K/UL — SIGNIFICANT CHANGE UP (ref 0–0)
NRBC # FLD: 0 K/UL — SIGNIFICANT CHANGE UP (ref 0–0)
PCO2 BLDV: 39 MMHG — LOW (ref 42–55)
PCO2 BLDV: 42 MMHG — SIGNIFICANT CHANGE UP (ref 42–55)
PH BLDV: 7.16 — LOW (ref 7.32–7.43)
PH BLDV: 7.33 — SIGNIFICANT CHANGE UP (ref 7.32–7.43)
PH UR: 6 — SIGNIFICANT CHANGE UP (ref 5–8)
PHOSPHATE SERPL-MCNC: 0.8 MG/DL — CRITICAL LOW (ref 2.5–4.5)
PHOSPHATE SERPL-MCNC: 1.7 MG/DL — LOW (ref 2.5–4.5)
PLATELET # BLD AUTO: 143 K/UL — LOW (ref 150–400)
PLATELET # BLD AUTO: 166 K/UL — SIGNIFICANT CHANGE UP (ref 150–400)
PO2 BLDV: 25 MMHG — SIGNIFICANT CHANGE UP (ref 25–45)
PO2 BLDV: 48 MMHG — HIGH (ref 25–45)
POTASSIUM BLDV-SCNC: 3.9 MMOL/L — SIGNIFICANT CHANGE UP (ref 3.5–5.1)
POTASSIUM BLDV-SCNC: SIGNIFICANT CHANGE UP MMOL/L (ref 3.5–5.1)
POTASSIUM SERPL-MCNC: 3.1 MMOL/L — LOW (ref 3.5–5.3)
POTASSIUM SERPL-MCNC: 4 MMOL/L — SIGNIFICANT CHANGE UP (ref 3.5–5.3)
POTASSIUM SERPL-MCNC: 4 MMOL/L — SIGNIFICANT CHANGE UP (ref 3.5–5.3)
POTASSIUM SERPL-MCNC: 4.6 MMOL/L — SIGNIFICANT CHANGE UP (ref 3.5–5.3)
POTASSIUM SERPL-SCNC: 3.1 MMOL/L — LOW (ref 3.5–5.3)
POTASSIUM SERPL-SCNC: 4 MMOL/L — SIGNIFICANT CHANGE UP (ref 3.5–5.3)
POTASSIUM SERPL-SCNC: 4 MMOL/L — SIGNIFICANT CHANGE UP (ref 3.5–5.3)
POTASSIUM SERPL-SCNC: 4.6 MMOL/L — SIGNIFICANT CHANGE UP (ref 3.5–5.3)
PROT SERPL-MCNC: 6.1 G/DL — SIGNIFICANT CHANGE UP (ref 6–8.3)
PROT SERPL-MCNC: 6.9 G/DL — SIGNIFICANT CHANGE UP (ref 6–8.3)
PROT UR-MCNC: SIGNIFICANT CHANGE UP MG/DL
PROTHROM AB SERPL-ACNC: 11.3 SEC — SIGNIFICANT CHANGE UP (ref 9.5–13)
PROTHROM AB SERPL-ACNC: 11.5 SEC — SIGNIFICANT CHANGE UP (ref 9.5–13)
PROTHROM AB SERPL-ACNC: 11.8 SEC — SIGNIFICANT CHANGE UP (ref 9.5–13)
RBC # BLD: 3.94 M/UL — LOW (ref 4.2–5.8)
RBC # BLD: 4.47 M/UL — SIGNIFICANT CHANGE UP (ref 4.2–5.8)
RBC # FLD: 12.5 % — SIGNIFICANT CHANGE UP (ref 10.3–14.5)
RBC # FLD: 12.6 % — SIGNIFICANT CHANGE UP (ref 10.3–14.5)
RBC CASTS # UR COMP ASSIST: 0 /HPF — SIGNIFICANT CHANGE UP (ref 0–4)
RH IG SCN BLD-IMP: POSITIVE — SIGNIFICANT CHANGE UP
SAO2 % BLDV: 50.3 % — LOW (ref 67–88)
SAO2 % BLDV: 80 % — SIGNIFICANT CHANGE UP (ref 67–88)
SODIUM SERPL-SCNC: 133 MMOL/L — LOW (ref 135–145)
SODIUM SERPL-SCNC: 137 MMOL/L — SIGNIFICANT CHANGE UP (ref 135–145)
SODIUM SERPL-SCNC: 138 MMOL/L — SIGNIFICANT CHANGE UP (ref 135–145)
SODIUM SERPL-SCNC: 139 MMOL/L — SIGNIFICANT CHANGE UP (ref 135–145)
SP GR SPEC: 1.04 — HIGH (ref 1–1.03)
SPECIMEN SOURCE: SIGNIFICANT CHANGE UP
SQUAMOUS # UR AUTO: 0 /HPF — SIGNIFICANT CHANGE UP (ref 0–5)
TRIGL SERPL-MCNC: 264 MG/DL — HIGH
UROBILINOGEN FLD QL: 0.2 MG/DL — SIGNIFICANT CHANGE UP (ref 0.2–1)
WBC # BLD: 11.16 K/UL — HIGH (ref 3.8–10.5)
WBC # BLD: 7.01 K/UL — SIGNIFICANT CHANGE UP (ref 3.8–10.5)
WBC # FLD AUTO: 11.16 K/UL — HIGH (ref 3.8–10.5)
WBC # FLD AUTO: 7.01 K/UL — SIGNIFICANT CHANGE UP (ref 3.8–10.5)
WBC UR QL: 0 /HPF — SIGNIFICANT CHANGE UP (ref 0–5)

## 2024-02-10 PROCEDURE — 74177 CT ABD & PELVIS W/CONTRAST: CPT | Mod: 26,MA

## 2024-02-10 PROCEDURE — 99285 EMERGENCY DEPT VISIT HI MDM: CPT

## 2024-02-10 PROCEDURE — 99291 CRITICAL CARE FIRST HOUR: CPT | Mod: GC

## 2024-02-10 PROCEDURE — 99223 1ST HOSP IP/OBS HIGH 75: CPT

## 2024-02-10 PROCEDURE — 99222 1ST HOSP IP/OBS MODERATE 55: CPT

## 2024-02-10 PROCEDURE — 93010 ELECTROCARDIOGRAM REPORT: CPT

## 2024-02-10 PROCEDURE — 93970 EXTREMITY STUDY: CPT | Mod: 26

## 2024-02-10 PROCEDURE — 99222 1ST HOSP IP/OBS MODERATE 55: CPT | Mod: GC

## 2024-02-10 RX ORDER — SODIUM,POTASSIUM PHOSPHATES 278-250MG
1 POWDER IN PACKET (EA) ORAL ONCE
Refills: 0 | Status: DISCONTINUED | OUTPATIENT
Start: 2024-02-10 | End: 2024-02-10

## 2024-02-10 RX ORDER — VANCOMYCIN HCL 1 G
750 VIAL (EA) INTRAVENOUS ONCE
Refills: 0 | Status: COMPLETED | OUTPATIENT
Start: 2024-02-10 | End: 2024-02-10

## 2024-02-10 RX ORDER — ACETAMINOPHEN 500 MG
1000 TABLET ORAL ONCE
Refills: 0 | Status: COMPLETED | OUTPATIENT
Start: 2024-02-10 | End: 2024-02-10

## 2024-02-10 RX ORDER — POTASSIUM CHLORIDE 20 MEQ
40 PACKET (EA) ORAL ONCE
Refills: 0 | Status: COMPLETED | OUTPATIENT
Start: 2024-02-10 | End: 2024-02-10

## 2024-02-10 RX ORDER — MAGNESIUM SULFATE 500 MG/ML
2 VIAL (ML) INJECTION ONCE
Refills: 0 | Status: COMPLETED | OUTPATIENT
Start: 2024-02-10 | End: 2024-02-10

## 2024-02-10 RX ORDER — HEPARIN SODIUM 5000 [USP'U]/ML
2500 INJECTION INTRAVENOUS; SUBCUTANEOUS EVERY 6 HOURS
Refills: 0 | Status: DISCONTINUED | OUTPATIENT
Start: 2024-02-10 | End: 2024-02-10

## 2024-02-10 RX ORDER — SENNA PLUS 8.6 MG/1
2 TABLET ORAL AT BEDTIME
Refills: 0 | Status: DISCONTINUED | OUTPATIENT
Start: 2024-02-10 | End: 2024-02-14

## 2024-02-10 RX ORDER — SODIUM CHLORIDE 9 MG/ML
1000 INJECTION INTRAMUSCULAR; INTRAVENOUS; SUBCUTANEOUS ONCE
Refills: 0 | Status: COMPLETED | OUTPATIENT
Start: 2024-02-10 | End: 2024-02-10

## 2024-02-10 RX ORDER — POTASSIUM CHLORIDE 20 MEQ
40 PACKET (EA) ORAL EVERY 4 HOURS
Refills: 0 | Status: COMPLETED | OUTPATIENT
Start: 2024-02-10 | End: 2024-02-10

## 2024-02-10 RX ORDER — INSULIN HUMAN 100 [IU]/ML
6.3 INJECTION, SOLUTION SUBCUTANEOUS
Qty: 100 | Refills: 0 | Status: DISCONTINUED | OUTPATIENT
Start: 2024-02-10 | End: 2024-02-10

## 2024-02-10 RX ORDER — TACROLIMUS 5 MG/1
2 CAPSULE ORAL
Refills: 0 | Status: DISCONTINUED | OUTPATIENT
Start: 2024-02-10 | End: 2024-02-14

## 2024-02-10 RX ORDER — SODIUM CHLORIDE 9 MG/ML
1000 INJECTION, SOLUTION INTRAVENOUS ONCE
Refills: 0 | Status: COMPLETED | OUTPATIENT
Start: 2024-02-10 | End: 2024-02-10

## 2024-02-10 RX ORDER — DEXTROSE MONOHYDRATE, SODIUM CHLORIDE, AND POTASSIUM CHLORIDE 50; .745; 4.5 G/1000ML; G/1000ML; G/1000ML
1000 INJECTION, SOLUTION INTRAVENOUS
Refills: 0 | Status: DISCONTINUED | OUTPATIENT
Start: 2024-02-10 | End: 2024-02-10

## 2024-02-10 RX ORDER — TACROLIMUS 5 MG/1
2 CAPSULE ORAL
Refills: 0 | DISCHARGE

## 2024-02-10 RX ORDER — POTASSIUM PHOSPHATE, MONOBASIC POTASSIUM PHOSPHATE, DIBASIC 236; 224 MG/ML; MG/ML
30 INJECTION, SOLUTION INTRAVENOUS ONCE
Refills: 0 | Status: DISCONTINUED | OUTPATIENT
Start: 2024-02-10 | End: 2024-02-10

## 2024-02-10 RX ORDER — PIPERACILLIN AND TAZOBACTAM 4; .5 G/20ML; G/20ML
3.38 INJECTION, POWDER, LYOPHILIZED, FOR SOLUTION INTRAVENOUS EVERY 8 HOURS
Refills: 0 | Status: DISCONTINUED | OUTPATIENT
Start: 2024-02-10 | End: 2024-02-12

## 2024-02-10 RX ORDER — VANCOMYCIN HCL 1 G
750 VIAL (EA) INTRAVENOUS EVERY 12 HOURS
Refills: 0 | Status: DISCONTINUED | OUTPATIENT
Start: 2024-02-11 | End: 2024-02-11

## 2024-02-10 RX ORDER — POTASSIUM PHOSPHATE, MONOBASIC POTASSIUM PHOSPHATE, DIBASIC 236; 224 MG/ML; MG/ML
15 INJECTION, SOLUTION INTRAVENOUS ONCE
Refills: 0 | Status: COMPLETED | OUTPATIENT
Start: 2024-02-10 | End: 2024-02-10

## 2024-02-10 RX ORDER — VANCOMYCIN HCL 1 G
VIAL (EA) INTRAVENOUS
Refills: 0 | Status: DISCONTINUED | OUTPATIENT
Start: 2024-02-10 | End: 2024-02-11

## 2024-02-10 RX ORDER — SODIUM CHLORIDE 9 MG/ML
1000 INJECTION INTRAMUSCULAR; INTRAVENOUS; SUBCUTANEOUS
Refills: 0 | Status: DISCONTINUED | OUTPATIENT
Start: 2024-02-10 | End: 2024-02-10

## 2024-02-10 RX ORDER — SODIUM CHLORIDE 9 MG/ML
1000 INJECTION, SOLUTION INTRAVENOUS
Refills: 0 | Status: DISCONTINUED | OUTPATIENT
Start: 2024-02-10 | End: 2024-02-10

## 2024-02-10 RX ORDER — POTASSIUM CHLORIDE 20 MEQ
10 PACKET (EA) ORAL
Refills: 0 | Status: COMPLETED | OUTPATIENT
Start: 2024-02-10 | End: 2024-02-10

## 2024-02-10 RX ORDER — DEXTROSE 50 % IN WATER 50 %
25 SYRINGE (ML) INTRAVENOUS ONCE
Refills: 0 | Status: DISCONTINUED | OUTPATIENT
Start: 2024-02-10 | End: 2024-02-12

## 2024-02-10 RX ORDER — POTASSIUM CHLORIDE 20 MEQ
10 PACKET (EA) ORAL ONCE
Refills: 0 | Status: COMPLETED | OUTPATIENT
Start: 2024-02-10 | End: 2024-02-10

## 2024-02-10 RX ORDER — HEPARIN SODIUM 5000 [USP'U]/ML
500 INJECTION INTRAVENOUS; SUBCUTANEOUS
Qty: 25000 | Refills: 0 | Status: DISCONTINUED | OUTPATIENT
Start: 2024-02-10 | End: 2024-02-11

## 2024-02-10 RX ORDER — SODIUM,POTASSIUM PHOSPHATES 278-250MG
1 POWDER IN PACKET (EA) ORAL EVERY 4 HOURS
Refills: 0 | Status: COMPLETED | OUTPATIENT
Start: 2024-02-10 | End: 2024-02-11

## 2024-02-10 RX ORDER — SODIUM CHLORIDE 9 MG/ML
1000 INJECTION, SOLUTION INTRAVENOUS
Refills: 0 | Status: DISCONTINUED | OUTPATIENT
Start: 2024-02-10 | End: 2024-02-11

## 2024-02-10 RX ORDER — PIPERACILLIN AND TAZOBACTAM 4; .5 G/20ML; G/20ML
3.38 INJECTION, POWDER, LYOPHILIZED, FOR SOLUTION INTRAVENOUS ONCE
Refills: 0 | Status: COMPLETED | OUTPATIENT
Start: 2024-02-10 | End: 2024-02-10

## 2024-02-10 RX ORDER — POLYETHYLENE GLYCOL 3350 17 G/17G
17 POWDER, FOR SOLUTION ORAL DAILY
Refills: 0 | Status: DISCONTINUED | OUTPATIENT
Start: 2024-02-10 | End: 2024-02-14

## 2024-02-10 RX ORDER — INSULIN HUMAN 100 [IU]/ML
2 INJECTION, SOLUTION SUBCUTANEOUS
Qty: 100 | Refills: 0 | Status: DISCONTINUED | OUTPATIENT
Start: 2024-02-10 | End: 2024-02-11

## 2024-02-10 RX ORDER — POTASSIUM CHLORIDE 20 MEQ
10 PACKET (EA) ORAL
Refills: 0 | Status: DISCONTINUED | OUTPATIENT
Start: 2024-02-10 | End: 2024-02-10

## 2024-02-10 RX ORDER — HEPARIN SODIUM 5000 [USP'U]/ML
5000 INJECTION INTRAVENOUS; SUBCUTANEOUS EVERY 6 HOURS
Refills: 0 | Status: DISCONTINUED | OUTPATIENT
Start: 2024-02-10 | End: 2024-02-10

## 2024-02-10 RX ORDER — INSULIN LISPRO 100/ML
8 VIAL (ML) SUBCUTANEOUS ONCE
Refills: 0 | Status: COMPLETED | OUTPATIENT
Start: 2024-02-10 | End: 2024-02-10

## 2024-02-10 RX ADMIN — SENNA PLUS 2 TABLET(S): 8.6 TABLET ORAL at 21:21

## 2024-02-10 RX ADMIN — INSULIN HUMAN 3 UNIT(S)/HR: 100 INJECTION, SOLUTION SUBCUTANEOUS at 19:12

## 2024-02-10 RX ADMIN — PIPERACILLIN AND TAZOBACTAM 200 GRAM(S): 4; .5 INJECTION, POWDER, LYOPHILIZED, FOR SOLUTION INTRAVENOUS at 10:17

## 2024-02-10 RX ADMIN — Medication 40 MILLIEQUIVALENT(S): at 21:21

## 2024-02-10 RX ADMIN — Medication 5 MILLIGRAM(S): at 16:22

## 2024-02-10 RX ADMIN — Medication 100 MILLIEQUIVALENT(S): at 12:01

## 2024-02-10 RX ADMIN — SODIUM CHLORIDE 1000 MILLILITER(S): 9 INJECTION INTRAMUSCULAR; INTRAVENOUS; SUBCUTANEOUS at 05:48

## 2024-02-10 RX ADMIN — PIPERACILLIN AND TAZOBACTAM 25 GRAM(S): 4; .5 INJECTION, POWDER, LYOPHILIZED, FOR SOLUTION INTRAVENOUS at 17:44

## 2024-02-10 RX ADMIN — INSULIN HUMAN 6.3 UNIT(S)/HR: 100 INJECTION, SOLUTION SUBCUTANEOUS at 08:37

## 2024-02-10 RX ADMIN — INSULIN HUMAN 3 UNIT(S)/HR: 100 INJECTION, SOLUTION SUBCUTANEOUS at 17:16

## 2024-02-10 RX ADMIN — Medication 400 MILLIGRAM(S): at 09:59

## 2024-02-10 RX ADMIN — SODIUM CHLORIDE 150 MILLILITER(S): 9 INJECTION INTRAMUSCULAR; INTRAVENOUS; SUBCUTANEOUS at 13:06

## 2024-02-10 RX ADMIN — Medication 1 PACKET(S): at 22:33

## 2024-02-10 RX ADMIN — Medication 40 MILLIEQUIVALENT(S): at 16:17

## 2024-02-10 RX ADMIN — Medication 25 GRAM(S): at 18:47

## 2024-02-10 RX ADMIN — Medication 100 MILLIEQUIVALENT(S): at 14:00

## 2024-02-10 RX ADMIN — SODIUM CHLORIDE 125 MILLILITER(S): 9 INJECTION, SOLUTION INTRAVENOUS at 19:58

## 2024-02-10 RX ADMIN — POLYETHYLENE GLYCOL 3350 17 GRAM(S): 17 POWDER, FOR SOLUTION ORAL at 16:21

## 2024-02-10 RX ADMIN — SODIUM CHLORIDE 150 MILLILITER(S): 9 INJECTION INTRAMUSCULAR; INTRAVENOUS; SUBCUTANEOUS at 10:45

## 2024-02-10 RX ADMIN — Medication 40 MILLIEQUIVALENT(S): at 22:33

## 2024-02-10 RX ADMIN — Medication 250 MILLIGRAM(S): at 16:18

## 2024-02-10 RX ADMIN — Medication 40 MILLIEQUIVALENT(S): at 17:44

## 2024-02-10 RX ADMIN — Medication 400 MILLIGRAM(S): at 03:42

## 2024-02-10 RX ADMIN — Medication 8 UNIT(S): at 07:18

## 2024-02-10 RX ADMIN — HEPARIN SODIUM 800 UNIT(S)/HR: 5000 INJECTION INTRAVENOUS; SUBCUTANEOUS at 22:09

## 2024-02-10 RX ADMIN — HEPARIN SODIUM 500 UNIT(S)/HR: 5000 INJECTION INTRAVENOUS; SUBCUTANEOUS at 16:18

## 2024-02-10 RX ADMIN — Medication 100 MILLIEQUIVALENT(S): at 13:02

## 2024-02-10 RX ADMIN — SODIUM CHLORIDE 1000 MILLILITER(S): 9 INJECTION, SOLUTION INTRAVENOUS at 07:08

## 2024-02-10 RX ADMIN — Medication 100 MILLIEQUIVALENT(S): at 10:45

## 2024-02-10 RX ADMIN — POTASSIUM PHOSPHATE, MONOBASIC POTASSIUM PHOSPHATE, DIBASIC 62.5 MILLIMOLE(S): 236; 224 INJECTION, SOLUTION INTRAVENOUS at 22:32

## 2024-02-10 RX ADMIN — SODIUM CHLORIDE 1000 MILLILITER(S): 9 INJECTION INTRAMUSCULAR; INTRAVENOUS; SUBCUTANEOUS at 03:42

## 2024-02-10 RX ADMIN — Medication 1000 MILLIGRAM(S): at 10:51

## 2024-02-10 NOTE — CONSULT NOTE ADULT - PROBLEM SELECTOR RECOMMENDATION 9
Pt. with kidney transplantation in 2020 ([facility], follows with Dr. Kaminski) currently admitted for perirectal abscess and DKA. SCr stable at 0.68. Monitor labs and urine output. Avoid any potential nephrotoxins. Dose medications as per eGFR.

## 2024-02-10 NOTE — H&P ADULT - NSHPREVIEWOFSYSTEMS_GEN_ALL_CORE
CONSTITUTIONAL:  No weight loss, fever, chills, weakness or fatigue.  HEENT:  Eyes:  No visual loss, blurred vision, double vision or yellow sclerae. Ears, Nose, Throat:  No hearing loss, sneezing, congestion, runny nose or sore throat.  SKIN:  No rash or itching.  CARDIOVASCULAR:  No chest pain, chest pressure or chest discomfort. No palpitations.  RESPIRATORY:  No shortness of breath, cough or sputum.  GASTROINTESTINAL: + rectal pain  GENITOURINARY:  Denies hematuria, dysuria.   NEUROLOGICAL:  No headache, dizziness, syncope, paralysis, ataxia, numbness or tingling in the extremities. No change in bowel or bladder control.  MUSCULOSKELETAL:  No muscle, back pain, joint pain or stiffness.  HEMATOLOGIC:  No anemia, bleeding or bruising.  LYMPHATICS:  No enlarged nodes.   PSYCHIATRIC:  No history of depression or anxiety.  ENDOCRINOLOGIC:  No reports of sweating, cold or heat intolerance. No polyuria or polydipsia.  ALLERGIES:  No history of asthma, hives, eczema or rhinitis.

## 2024-02-10 NOTE — CONSULT NOTE ADULT - ATTENDING COMMENTS
38 y.o. male with h/o uncontrolled Type 2 DM s/p renal transplant presents with DKA in the setting of perineal infection.    Patient with high medical complexity and high level decision making.    1. Type 2 DM with DKA- Discussed importance of good glycemic control  For now, will continue IV insulin drip protocol with FS Q1. Need to monitor BMP and electrolytes closely every 4hours. Recommend IVFs and will keep NPO for now.   Based on insulin drip requirements and when metabolic parameters improve with transition to basal bolus regimen.    2. Would screen for Type 1 DM/LILY    3. Will follow up with his endocrinologist, Dr. Merino upon discharge.      Tara Grant DO  Attending Division of Endocrinology  [phone]

## 2024-02-10 NOTE — CONSULT NOTE ADULT - SUBJECTIVE AND OBJECTIVE BOX
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 Nephrology consulted for immunosuppression management.    Pt seen and examined in the CTICU. He states he is "feeling good". He notes he is on Tacrolimus, Cellcept and Prednisone at home. He states he was diagnosed with DM after his renal transplant. He follows with Dr. Kaminski. He has rectal/buttock pain but denied n/v/f/c/sob. He is urinating without difficulty.     PAST HISTORY  --------------------------------------------------------------------------------  PAST MEDICAL & SURGICAL HISTORY:  DM (diabetes mellitus)  S/P kidney transplant    FAMILY HISTORY:  FH: type 2 diabetes (Father)    PAST SOCIAL HISTORY: No illicit drugs     ALLERGIES & MEDICATIONS  --------------------------------------------------------------------------------  Allergies  penicillin (Hives; Angioedema)    Intolerances    Standing Inpatient Medications  dextrose 5% + sodium chloride 0.9%. 1000 milliLiter(s) IV Continuous <Continuous>  dextrose 50% Injectable 25 Gram(s) IV Push once  heparin  Infusion. 500 Unit(s)/Hr IV Continuous <Continuous>  insulin regular Infusion 3 Unit(s)/Hr IV Continuous <Continuous>  piperacillin/tazobactam IVPB.. 3.375 Gram(s) IV Intermittent every 8 hours  polyethylene glycol 3350 17 Gram(s) Oral daily  potassium chloride    Tablet ER 40 milliEquivalent(s) Oral every 4 hours  predniSONE   Tablet 5 milliGRAM(s) Oral daily  senna 2 Tablet(s) Oral at bedtime  vancomycin  IVPB        PRN Inpatient Medications  heparin   Injectable 2500 Unit(s) IV Push every 6 hours PRN  heparin   Injectable 5000 Unit(s) IV Push every 6 hours PRN    REVIEW OF SYSTEMS  --------------------------------------------------------------------------------  Gen: No fevers/chills  Head/Eyes/Ears: No HA  Respiratory: No dyspnea, cough  CV: No chest pain  GI: No abdominal pain, diarrhea. +rectal abscess  : No dysuria, hematuria  MSK: No edema  Skin: No rashes  Heme: No easy bruising or bleeding    All other systems were reviewed and are negative, except as noted.    VITALS/PHYSICAL EXAM  --------------------------------------------------------------------------------  T(C): 37.2 (02-10-24 @ 16:00), Max: 37.2 (02-10-24 @ 16:00)  HR: 87 (02-10-24 @ 17:00) (81 - 98)  BP: 121/91 (02-10-24 @ 17:00) (102/65 - 149/76)  RR: 12 (02-10-24 @ 17:00) (12 - 26)  SpO2: 100% (02-10-24 @ 17:00) (97% - 100%)  Wt(kg): --  Height (cm): 167.6 (02-10-24 @ 12:50)  Weight (kg): 62.959 (02-10-24 @ 12:50)  BMI (kg/m2): 22.4 (02-10-24 @ 12:50)  BSA (m2): 1.71 (02-10-24 @ 12:50)      02-10-24 @ 07:01  -  02-10-24 @ 17:23  --------------------------------------------------------  IN: 863 mL / OUT: 800 mL / NET: 63 mL    Physical Exam:  	Gen: NAD  	HEENT: Anicteric  	Pulm: CTA B/L  	CV: S1S2+  	Abd: Soft, +BS                Transplant site: RLQ non tender, well healed surgical scar.  	Ext: No LE edema B/L  	Neuro: Awake  	Skin: Warm and dry  	Dialysis access: LUE AVF, aneurysmal +thrill, +bruit    LABS/STUDIES  --------------------------------------------------------------------------------              13.0   7.01  >-----------<  143      [02-10-24 @ 15:45]              36.9     139  |  107  |  14  ----------------------------<  180      [02-10-24 @ 15:45]  4.0   |  13  |  0.68        Ca     9.5     [02-10-24 @ 15:45]      Mg     1.50     [02-10-24 @ 15:45]      Phos  1.7     [02-10-24 @ 15:45]    TPro  6.1  /  Alb  3.1  /  TBili  0.6  /  DBili  x   /  AST  63  /  ALT  141  /  AlkPhos  82  [02-10-24 @ 15:45]    PT/INR: PT 11.8 , INR 1.06       [02-10-24 @ 15:45]  PTT: 23.6       [02-10-24 @ 15:45]    Creatinine Trend:  SCr 0.68 [02-10 @ 15:45]  SCr 0.74 [02-10 @ 10:30]  SCr 1.09 [02-10 @ 03:30]

## 2024-02-10 NOTE — H&P ADULT - ATTENDING COMMENTS
198.4
The patient is a 38 Y.O. with pmh of ESRD 2/2 FSGS now s/p transplant in 2019,     Patient presents to the ED found to have genesis-rectal abscess also noted to be acidotic found to be in DKA. as well as an acute DVT    # DKA  # Genesis-rectal abscess  # DVT  - C/W insulin gtt after repletion K, c/w aggressive IVF. Check FSG q1 hours, BMP q4 hours,   - Likely 2/2 to genesis-recal abscess s/p ID by surgery  - F/U with cultures  - C/W zosyn/vanc, patient is also immuosupressed  - F/U with renal about immunosuppresive agents. Hold cell cept. Check tacro level 30 min prior to dose  - DVT, unprovoked, c/w hep gtt will wait 6 hours after ID  - Dipos- full code.

## 2024-02-10 NOTE — CONSULT NOTE ADULT - PROBLEM SELECTOR RECOMMENDATION 2
Currently on immunosuppression (oral tacrolimus 2mg BID,  mg BID and prednisone 5 mg PO OD)  for kidney transplant. Recommend continuing home Tacrolimus 2mg BID and home Prednisone 5mg daily. Given perirectal abscess, would hold MMF for now. Check serum tacrolimus level (trough) 30 mins prior to am dose.

## 2024-02-10 NOTE — H&P ADULT - NSHPPHYSICALEXAM_GEN_ALL_CORE
ICU Vital Signs Last 24 Hrs  T(C): 36.9 (10 Feb 2024 12:50), Max: 37.1 (10 Feb 2024 00:25)  T(F): 98.5 (10 Feb 2024 12:50), Max: 98.7 (10 Feb 2024 00:25)  HR: 82 (10 Feb 2024 14:00) (81 - 98)  BP: 122/88 (10 Feb 2024 14:00) (102/65 - 149/76)  BP(mean): 99 (10 Feb 2024 14:00) (99 - 107)  ABP: --  ABP(mean): --  RR: 19 (10 Feb 2024 14:00) (13 - 19)  SpO2: 99% (10 Feb 2024 14:00) (97% - 100%)    O2 Parameters below as of 10 Feb 2024 14:00  Patient On (Oxygen Delivery Method): room air      GENERAL APPEARANCE: Well developed, NAD  HEENT:  PERRL, EOMI. hearing grossly intact.  NECK: Neck supple, non-tender no lymphadenopathy, masses or thyromegaly.  CARDIAC: Normal S1 and S2. no mrg. RRR  LUNGS: Clear to auscultation B/L, no rales, rhonchi, or wheezing  ABDOMEN: Soft , NTND, bowel sounds normal. No guarding or rebound.   MUSCULOSKELETAL: ROM intact.  No joint erythema or tenderness.   EXTREMITIES: RLE tender to palpation. Calf with cord-like vein appreciated.   NEUROLOGICAL: Non focal. Strength and sensation symmetric and intact throughout.   SKIN: Warm and dry , Well perfused  PSYCHIATRIC: AOx4, Normal mood and affect

## 2024-02-10 NOTE — H&P ADULT - ASSESSMENT
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    Neuro  No active issues  Pain control, as needed    Card  HTN  Home Meds: Nifedipine 30, Coreg 12.5 BID, Lisinopril 5  - normotensive inpatient  - holding home meds since infected, will resume if needed    Pulm  No active issues    GI/Hep  Diet: NPO for now on insulin gtt  Bowel regimen since with rectal pain  - surgery recs    /Renal  S/p renal transplant, not on HD.  Hydronephrosis on CT  Home Meds: Pred 5, Mycophenolate 500 BID, Tacrolimu 2 BID  - transplant renal consulted, f/u recs  - monitor SCr,   - consider repeat Renal sono to eval hydro  - monitor I/Os  - hold tacro and cellcept until transplant renal eval  - f/u renal regarding hydronephrosis  - c/w prednisone 5mg qd    Heme/Onc  Patient with RLE extensive DVT above and below knee  Unclear cause  - Hep gtt for now, eventual DOAC  - Hypercoagulable workup    ID  Perianal abscess  s/p I&D By surgery  - wound culture in lab, f/u results  - BCx x2, UCx in lab  - vanc and zosyn  - MRSA swab  - ID consulted, f/u recs    Endocrine  DM with DKA: pH 7.23, AG 28, Bicarb 8, BHB 7.1  Likely exacerbated by infection, as patient reports good insulin adherence, though A1C is > 12.   Home med: Insulin 18U Nightly and 8U pre-meals.   - insulin gtt, replete K > 5, Keep glucose > 250, VBG and BMP q4 until gap closure, FS q1, bridge to basal/bolus once gap closes.   - encodrine consulted, f/u recs  - lipid panel    Ethics  Full Code

## 2024-02-10 NOTE — ED ADULT NURSE NOTE - OBJECTIVE STATEMENT
[place] speaking Pt received from triage on stretcher, A/Ox4 answers all questions appropriately. C/O right leg pain x 5days with constipation x 4 days. Hx of right kidney transplant.. Pt denies chest pain and SOB during initial assessment, breathing is even and unlabored on room air. Abdomen is soft and non-distended, non-tender touch. Pt ambulates independently at baseline, moves all four extremities on command, skin is intact. Pt resting comfortably at the bedside, no apparent visible acute distress noted on initial assessment. Vital signs stable, NKA to medications. PMHx Kidney transplant HTN DM2. 20g peripheral IV placed on RAC, labs obtained and sent, medications administered per work order. Pending further provider orders.

## 2024-02-10 NOTE — ED ADULT NURSE NOTE - NSFALLUNIVINTERV_ED_ALL_ED
Bed/Stretcher in lowest position, wheels locked, appropriate side rails in place/Call bell, personal items and telephone in reach/Instruct patient to call for assistance before getting out of bed/chair/stretcher/Non-slip footwear applied when patient is off stretcher/[place] to call system/Physically safe environment - no spills, clutter or unnecessary equipment/Purposeful proactive rounding/Room/bathroom lighting operational, light cord in reach

## 2024-02-10 NOTE — PROCEDURE NOTE - PROCEDURE DATE TIME, MLM
10-Feb-2024 11:18 Quality 431: Preventive Care And Screening: Unhealthy Alcohol Use - Screening: Patient not identified as an unhealthy alcohol user when screened for unhealthy alcohol use using a systematic screening method Detail Level: Detailed Quality 226: Preventive Care And Screening: Tobacco Use: Screening And Cessation Intervention: Patient screened for tobacco use and is an ex/non-smoker Quality 130: Documentation Of Current Medications In The Medical Record: Current Medications Documented

## 2024-02-10 NOTE — CONSULT NOTE ADULT - SUBJECTIVE AND OBJECTIVE BOX
HPI: 38 year old male with PMH kidney transplant, DMT2 presented on 2/10 for 4 days of perirectal pain. Patient has never has a similar issue before, has not had a colonoscopy and has no family or personal history of IBD. In the ED found to be in DKA and also DVT study with above and below knee DVT. Denies fevers, chills, chest pain, dyspnea, headache, nausea, emesis.     prior hospital charts reviewed [  ]  primary team notes reviewed [  ]  other consultant notes reviewed [  ]    PAST MEDICAL & SURGICAL HISTORY:  DM (diabetes mellitus)  S/P kidney transplant    Allergies  penicillin (Hives; Angioedema)    ANTIMICROBIALS (past 90 days)  MEDICATIONS  (STANDING):  piperacillin/tazobactam IVPB...   200 mL/Hr IV Intermittent (02-10-24 @ 10:17)    MEDICATIONS  (STANDING):  dextrose 50% Injectable 25 once    SOCIAL HISTORY:       FAMILY HISTORY:    REVIEW OF SYSTEMS  [  ] ROS unobtainable because:    [  ] All other systems negative except as noted below:	    Constitutional:  [ ] fever [ ] chills  [ ] weight loss  [ ] weakness  Skin:  [ ] rash [ ] phlebitis	  Eyes: [ ] icterus [ ] pain  [ ] discharge	  ENMT: [ ] sore throat  [ ] thrush [ ] ulcers [ ] exudates  Respiratory: [ ] dyspnea [ ] hemoptysis [ ] cough [ ] sputum	  Cardiovascular:  [ ] chest pain [ ] palpitations [ ] edema	  Gastrointestinal:  [ ] nausea [ ] vomiting [ ] diarrhea [ ] constipation [ ] pain	  Genitourinary:  [ ] dysuria [ ] frequency [ ] hematuria [ ] discharge [ ] flank pain  [ ] incontinence  Musculoskeletal:  [ ] myalgias [ ] arthralgias [ ] arthritis  [ ] back pain  Neurological:  [ ] headache [ ] seizures  [ ] confusion/altered mental status  Psychiatric:  [ ] anxiety [ ] depression	  Hematology/Lymphatics:  [ ] lymphadenopathy  Endocrine:  [ ] adrenal [ ] thyroid  Allergic/Immunologic:	 [ ] transplant [ ] seasonal    Vital Signs Last 24 Hrs  T(F): 98.5 (02-10-24 @ 12:50), Max: 98.7 (02-10-24 @ 00:25)  Vital Signs Last 24 Hrs  HR: 81 (02-10-24 @ 13:00) (81 - 98)  BP: 127/94 (02-10-24 @ 13:00) (102/65 - 149/76)  RR: 13 (02-10-24 @ 13:00)  SpO2: 98% (02-10-24 @ 13:00) (97% - 100%)  Wt(kg): --    PHYSICAL EXAM:                              14.9   11.16 )-----------( 166      ( 10 Feb 2024 03:30 )             41.6   02-10    137  |  107  |  18  ----------------------------<  201<H>  3.1<L>   |  13<L>  |  0.74    Ca    9.1      10 Feb 2024 10:30    TPro  6.1  /  Alb  3.0<L>  /  TBili  0.4  /  DBili  x   /  AST  56<H>  /  ALT  139<H>  /  AlkPhos  83  02-10    Urinalysis Basic - ( 10 Feb 2024 10:30 )    Color: x / Appearance: x / SG: x / pH: x  Gluc: 201 mg/dL / Ketone: x  / Bili: x / Urobili: x   Blood: x / Protein: x / Nitrite: x   Leuk Esterase: x / RBC: x / WBC x   Sq Epi: x / Non Sq Epi: x / Bacteria: x    MICROBIOLOGY:    RADIOLOGY:  imaging below personally reviewed and agree with findings    < from: CT Abdomen and Pelvis w/ IV Cont (02.10.24 @ 06:18) >  IMPRESSION:  Perianal abscess measuring 2.1 x 1.5 x 2.6 cm, seen at the anal verge    7 mm cystic lesion of the pancreatic head. One year follow-up advised,   preferably with MRI.    Right pelvic renal transplant with hydronephrosis.    Probable constipation.    Question gastritis versus underdistention.      < end of copied text >  < from: US Duplex Venous Lower Ext Complete, Bilateral (02.10.24 @ 05:51) >  Acute deep venous thrombosis: above and below the knee.    < end of copied text >   HPI: 38 year old male with PMH kidney transplant, DMT2 presented on 2/10 for 4 days of perirectal pain. Patient has never has a similar issue before, has not had a colonoscopy and has no family or personal history of IBD. In the ED found to be in DKA and also DVT study with above and below knee DVT. Denies fevers, chills, chest pain, dyspnea, headache, nausea, emesis.  Seen by surgery and I+D performed.  Cultures pending.      prior hospital charts reviewed [  ]  primary team notes reviewed [ x ]  other consultant notes reviewed [ x ]    PAST MEDICAL & SURGICAL HISTORY:  DM (diabetes mellitus)  S/P kidney transplant    Allergies  penicillin (Hives; Angioedema)    ANTIMICROBIALS:  piperacillin/tazobactam IVPB (2/10 x1)    MEDICATIONS  (STANDING):  dextrose 50% Injectable 25 once    SOCIAL HISTORY:       FAMILY HISTORY:    REVIEW OF SYSTEMS  [  ] ROS unobtainable because:    [  ] All other systems negative except as noted below:	    Constitutional:  [ ] fever [ ] chills  [ ] weight loss  [ ] weakness  Skin:  [ ] rash [ ] phlebitis	  Eyes: [ ] icterus [ ] pain  [ ] discharge	  ENMT: [ ] sore throat  [ ] thrush [ ] ulcers [ ] exudates  Respiratory: [ ] dyspnea [ ] hemoptysis [ ] cough [ ] sputum	  Cardiovascular:  [ ] chest pain [ ] palpitations [ ] edema	  Gastrointestinal:  [ ] nausea [ ] vomiting [ ] diarrhea [ ] constipation [ ] pain	  Genitourinary:  [ ] dysuria [ ] frequency [ ] hematuria [ ] discharge [ ] flank pain  [ ] incontinence  Musculoskeletal:  [ ] myalgias [ ] arthralgias [ ] arthritis  [ ] back pain  Neurological:  [ ] headache [ ] seizures  [ ] confusion/altered mental status  Psychiatric:  [ ] anxiety [ ] depression	  Hematology/Lymphatics:  [ ] lymphadenopathy  Endocrine:  [ ] adrenal [ ] thyroid  Allergic/Immunologic:	 [ ] transplant [ ] seasonal    Vital Signs Last 24 Hrs  T(F): 98.5 (02-10-24 @ 12:50), Max: 98.7 (02-10-24 @ 00:25)  Vital Signs Last 24 Hrs  HR: 81 (02-10-24 @ 13:00) (81 - 98)  BP: 127/94 (02-10-24 @ 13:00) (102/65 - 149/76)  RR: 13 (02-10-24 @ 13:00)  SpO2: 98% (02-10-24 @ 13:00) (97% - 100%)  Wt(kg): --    PHYSICAL EXAM:                              14.9   11.16 )-----------( 166      ( 10 Feb 2024 03:30 )             41.6     137  |  107  |  18  ----------------------------<  201<H>  3.1<L>   |  13<L>  |  0.74    Ca    9.1      10 Feb 2024 10:30    TPro  6.1  /  Alb  3.0<L>  /  TBili  0.4  /  DBili  x   /  AST  56<H>  /  ALT  139<H>  /  AlkPhos  83  02-10    Urinalysis + Microscopic Examination (02.10.24 @ 03:10)   pH Urine: 6.0  Urine Appearance: Clear  Color: Yellow  Specific Gravity: 1.038  Protein, Urine: Trace mg/dL  Glucose Qualitative, Urine: >=1000 mg/dL  Ketone - Urine: >=160 mg/dL  Blood, Urine: Negative  Bilirubin: Negative  Urobilinogen: 0.2 mg/dL  Leukocyte Esterase Concentration: Negative  Nitrite: Negative  White Blood Cell - Urine: 0 /HPF  Red Blood Cell - Urine: 0 /HPF  Bacteria: Negative /HPF  Cast: 0 /LPF  Epithelial Cells: 0 /HPF  Urinalysis Basic - ( 10 Feb 2024 10:30 )    MICROBIOLOGY:  2/10 UC pending  2/10 Abscess culture pending    RADIOLOGY:  imaging below personally reviewed and agree with findings    CT Abdomen and Pelvis w/ IV Cont (02.10.24 @ 06:18) >  IMPRESSION:  Perianal abscess measuring 2.1 x 1.5 x 2.6 cm, seen at the anal verge.  7 mm cystic lesion of the pancreatic head. One year follow-up advised, preferably with MRI.  Right pelvic renal transplant with hydronephrosis.  Probable constipation.  Question gastritis versus underdistention.    US Duplex Venous Lower Ext Complete, Bilateral (02.10.24 @ 05:51) >  Acute deep venous thrombosis: above and below the knee.   The patient is a 38y Male complaining of sent by MD.    HPI: 38 year old male with PMH kidney transplant, DMT2 presented on 2/10 for 4 days of perirectal pain. Patient has never has a similar issue before, has not had a colonoscopy and has no family or personal history of IBD. In the ED found to be in DKA and also DVT study with above and below knee DVT. Denies fevers, chills, chest pain, dyspnea, headache, nausea, emesis.  Seen by surgery and I+D performed.  Cultures pending.      prior hospital charts reviewed [  ]  primary team notes reviewed [ x ]  other consultant notes reviewed [ x ]    PAST MEDICAL & SURGICAL HISTORY:  DM (diabetes mellitus)  S/P kidney transplant    Allergies  penicillin (Hives; Angioedema)    ANTIMICROBIALS:  piperacillin/tazobactam IVPB (2/10 x1)    MEDICATIONS  (STANDING):  dextrose 50% Injectable 25 once    SOCIAL HISTORY:   never a smoker    FAMILY HISTORY:    REVIEW OF SYSTEMS  [  ] ROS unobtainable because:    [  ] All other systems negative except as noted below:	    Constitutional:  [ ] fever [ ] chills  [ ] weight loss  [ ] weakness  Skin:  [ ] rash [ ] phlebitis	  Eyes: [ ] icterus [ ] pain  [ ] discharge	  ENMT: [ ] sore throat  [ ] thrush [ ] ulcers [ ] exudates  Respiratory: [ ] dyspnea [ ] hemoptysis [ ] cough [ ] sputum	  Cardiovascular:  [ ] chest pain [ ] palpitations [ ] edema	  Gastrointestinal:  [ ] nausea [ ] vomiting [ ] diarrhea [ ] constipation [ ] pain	  Genitourinary:  [ ] dysuria [ ] frequency [ ] hematuria [ ] discharge [ ] flank pain  [ ] incontinence  Musculoskeletal:  [ ] myalgias [ ] arthralgias [ ] arthritis  [ ] back pain  Neurological:  [ ] headache [ ] seizures  [ ] confusion/altered mental status  Psychiatric:  [ ] anxiety [ ] depression	  Hematology/Lymphatics:  [ ] lymphadenopathy  Endocrine:  [ ] adrenal [ ] thyroid  Allergic/Immunologic:	 [ ] transplant [ ] seasonal    Vital Signs Last 24 Hrs  T(F): 98.5 (02-10-24 @ 12:50), Max: 98.7 (02-10-24 @ 00:25)  Vital Signs Last 24 Hrs  HR: 81 (02-10-24 @ 13:00) (81 - 98)  BP: 127/94 (02-10-24 @ 13:00) (102/65 - 149/76)  RR: 13 (02-10-24 @ 13:00)  SpO2: 98% (02-10-24 @ 13:00) (97% - 100%)  Wt(kg): --    PHYSICAL EXAM:                              14.9   11.16 )-----------( 166      ( 10 Feb 2024 03:30 )             41.6     137  |  107  |  18  ----------------------------<  201<H>  3.1<L>   |  13<L>  |  0.74    Ca    9.1      10 Feb 2024 10:30    TPro  6.1  /  Alb  3.0<L>  /  TBili  0.4  /  DBili  x   /  AST  56<H>  /  ALT  139<H>  /  AlkPhos  83  02-10    Urinalysis + Microscopic Examination (02.10.24 @ 03:10)   pH Urine: 6.0  Urine Appearance: Clear  Color: Yellow  Specific Gravity: 1.038  Protein, Urine: Trace mg/dL  Glucose Qualitative, Urine: >=1000 mg/dL  Ketone - Urine: >=160 mg/dL  Blood, Urine: Negative  Bilirubin: Negative  Urobilinogen: 0.2 mg/dL  Leukocyte Esterase Concentration: Negative  Nitrite: Negative  White Blood Cell - Urine: 0 /HPF  Red Blood Cell - Urine: 0 /HPF  Bacteria: Negative /HPF  Cast: 0 /LPF  Epithelial Cells: 0 /HPF  Urinalysis Basic - ( 10 Feb 2024 10:30 )    MICROBIOLOGY:  2/10 UC pending  2/10 Abscess culture pending    RADIOLOGY:  imaging below personally reviewed and agree with findings    CT Abdomen and Pelvis w/ IV Cont (02.10.24 @ 06:18) >  IMPRESSION:  Perianal abscess measuring 2.1 x 1.5 x 2.6 cm, seen at the anal verge.  7 mm cystic lesion of the pancreatic head. One year follow-up advised, preferably with MRI.  Right pelvic renal transplant with hydronephrosis.  Probable constipation.  Question gastritis versus underdistention.    US Duplex Venous Lower Ext Complete, Bilateral (02.10.24 @ 05:51) >  Acute deep venous thrombosis: above and below the knee.   The patient is a 38y Male complaining of sent by MD.    HPI: 38 year old male with PMH kidney transplant, DMT2 presented on 2/10 for 4 days of perirectal pain. Patient has never has a similar issue before, has not had a colonoscopy and has no family or personal history of IBD. In the ED found to be in DKA and also DVT study with above and below knee DVT. Denies fevers, chills, chest pain, dyspnea, headache, nausea, emesis.  Seen by surgery and I+D performed.  Cultures pending.      prior hospital charts reviewed [  ]  primary team notes reviewed [ x ]  other consultant notes reviewed [ x ]    PAST MEDICAL & SURGICAL HISTORY:  DM (diabetes mellitus)  S/P kidney transplant  Hx ESRD was previously on HD via LUE AVF for 13 years until transplant    Allergies  penicillin (Hives; Angioedema)    ANTIMICROBIALS:  piperacillin/tazobactam IVPB (2/10 x1)    MEDICATIONS  (STANDING):  dextrose 50% Injectable 25 once    SOCIAL HISTORY:   never a smoker    FAMILY HISTORY:  great aunt or uncle with ESRD    REVIEW OF SYSTEMS  [  ] ROS unobtainable because:    [  ] All other systems negative except as noted below:	    Constitutional:  [ ] fever [ ] chills  [ ] weight loss  [ ] weakness  Skin:  [ ] rash [ ] phlebitis	  Eyes: [ ] icterus [ ] pain  [ ] discharge	  ENMT: [ ] sore throat  [ ] thrush [ ] ulcers [ ] exudates  Respiratory: [ ] dyspnea [ ] hemoptysis [ ] cough [ ] sputum	  Cardiovascular:  [ ] chest pain [ ] palpitations [ ] edema	  Gastrointestinal:  [ ] nausea [ ] vomiting [ ] diarrhea [ ] constipation [ ] pain	  Genitourinary:  [ ] dysuria [ ] frequency [ ] hematuria [ ] discharge [ ] flank pain  [ ] incontinence  Musculoskeletal:  [ ] myalgias [ ] arthralgias [ ] arthritis  [ ] back pain  Neurological:  [ ] headache [ ] seizures  [ ] confusion/altered mental status  Psychiatric:  [ ] anxiety [ ] depression	  Hematology/Lymphatics:  [ ] lymphadenopathy  Endocrine:  [ ] adrenal [ ] thyroid  Allergic/Immunologic:	 [ ] transplant [ ] seasonal    Vital Signs Last 24 Hrs  T(F): 98.5 (02-10-24 @ 12:50), Max: 98.7 (02-10-24 @ 00:25)  Vital Signs Last 24 Hrs  HR: 81 (02-10-24 @ 13:00) (81 - 98)  BP: 127/94 (02-10-24 @ 13:00) (102/65 - 149/76)  RR: 13 (02-10-24 @ 13:00)  SpO2: 98% (02-10-24 @ 13:00) (97% - 100%)  Wt(kg): --    PHYSICAL EXAM:                              14.9   11.16 )-----------( 166      ( 10 Feb 2024 03:30 )             41.6     137  |  107  |  18  ----------------------------<  201<H>  3.1<L>   |  13<L>  |  0.74    Ca    9.1      10 Feb 2024 10:30    TPro  6.1  /  Alb  3.0<L>  /  TBili  0.4  /  DBili  x   /  AST  56<H>  /  ALT  139<H>  /  AlkPhos  83  02-10    Urinalysis + Microscopic Examination (02.10.24 @ 03:10)   pH Urine: 6.0  Urine Appearance: Clear  Color: Yellow  Specific Gravity: 1.038  Protein, Urine: Trace mg/dL  Glucose Qualitative, Urine: >=1000 mg/dL  Ketone - Urine: >=160 mg/dL  Blood, Urine: Negative  Bilirubin: Negative  Urobilinogen: 0.2 mg/dL  Leukocyte Esterase Concentration: Negative  Nitrite: Negative  White Blood Cell - Urine: 0 /HPF  Red Blood Cell - Urine: 0 /HPF  Bacteria: Negative /HPF  Cast: 0 /LPF  Epithelial Cells: 0 /HPF  Urinalysis Basic - ( 10 Feb 2024 10:30 )    MICROBIOLOGY:  2/10 UC pending  2/10 Abscess culture pending    RADIOLOGY:  imaging below personally reviewed and agree with findings    CT Abdomen and Pelvis w/ IV Cont (02.10.24 @ 06:18) >  IMPRESSION:  Perianal abscess measuring 2.1 x 1.5 x 2.6 cm, seen at the anal verge.  7 mm cystic lesion of the pancreatic head. One year follow-up advised, preferably with MRI.  Right pelvic renal transplant with hydronephrosis.  Probable constipation.  Question gastritis versus underdistention.    US Duplex Venous Lower Ext Complete, Bilateral (02.10.24 @ 05:51) >  Acute deep venous thrombosis: above and below the knee.   The patient is a 38y Male complaining of sent by MD.    HPI: 38 year old male with PMH kidney transplant, DMT2 presented on 2/10 for 4 days of perirectal pain. Patient has never has a similar issue before, has not had a colonoscopy and has no family or personal history of IBD. In the ED found to be in DKA and also DVT study with above and below knee DVT. Denies fevers, chills, chest pain, dyspnea, headache, nausea, emesis.  Seen by surgery and I+D performed.  Cultures pending.      prior hospital charts reviewed [ X]  primary team notes reviewed [ x ]  other consultant notes reviewed [ x ]    PAST MEDICAL & SURGICAL HISTORY:  DM (diabetes mellitus)  S/P kidney transplant  Hx ESRD was previously on HD via LUE AVF for 13 years until transplant    Allergies  penicillin (Hives; Angioedema)    ANTIMICROBIALS:  piperacillin/tazobactam IVPB (2/10 x1)    MEDICATIONS  (STANDING):  dextrose 50% Injectable 25 once    SOCIAL HISTORY:   never a smoker    FAMILY HISTORY:  great aunt or uncle with ESRD    REVIEW OF SYSTEMS  [  ] ROS unobtainable because:    [ X ] All other systems negative except as noted below:	    Constitutional:  [ ] fever [ ] chills  [ ] weight loss  [X] weakness  Skin:  [ ] rash [ ] phlebitis	  Eyes: [ ] icterus [ ] pain  [ ] discharge	  ENMT: [ ] sore throat  [ ] thrush [ ] ulcers [ ] exudates  Respiratory: [ ] dyspnea [ ] hemoptysis [ ] cough [ ] sputum	  Cardiovascular:  [ ] chest pain [ ] palpitations [ ] edema	  Gastrointestinal:  [ ] nausea [ ] vomiting [ ] diarrhea [ ] constipation [ ] pain	  Genitourinary:  [ ] dysuria [ ] frequency [ ] hematuria [ ] discharge [ ] flank pain  [ ] incontinence  Musculoskeletal:  [ ] myalgias [ ] arthralgias [ ] arthritis  [ ] back pain  Neurological:  [ ] headache [ ] seizures  [ ] confusion/altered mental status  Psychiatric:  [ ] anxiety [ ] depression	  Hematology/Lymphatics:  [ ] lymphadenopathy  Endocrine:  [ ] adrenal [ ] thyroid  Allergic/Immunologic:	 [ ] transplant [ ] seasonal    Vital Signs Last 24 Hrs  T(F): 98.5 (02-10-24 @ 12:50), Max: 98.7 (02-10-24 @ 00:25)  Vital Signs Last 24 Hrs  HR: 81 (02-10-24 @ 13:00) (81 - 98)  BP: 127/94 (02-10-24 @ 13:00) (102/65 - 149/76)  RR: 13 (02-10-24 @ 13:00)  SpO2: 98% (02-10-24 @ 13:00) (97% - 100%)  Wt(kg): --    PHYSICAL EXAM:    CONSTITUTIONAL: no apparent distress  EYES: No conjunctival or scleral injection, non-icteric  ENMT: Oral mucosa with moist membranes.  NECK: Supple  RESP: CTA b/l, no WRR  CV: RRR, +S1S2  GI: Soft, NT, ND, no rebound, no guarding  MSK: Normal ROM without pain  SKIN: perirectal tenderness  NEURO: alert, oriented X3                           14.9   11.16 )-----------( 166      ( 10 Feb 2024 03:30 )             41.6     137  |  107  |  18  ----------------------------<  201<H>  3.1<L>   |  13<L>  |  0.74    Ca    9.1      10 Feb 2024 10:30    TPro  6.1  /  Alb  3.0<L>  /  TBili  0.4  /  DBili  x   /  AST  56<H>  /  ALT  139<H>  /  AlkPhos  83  02-10    Urinalysis + Microscopic Examination (02.10.24 @ 03:10)   pH Urine: 6.0  Urine Appearance: Clear  Color: Yellow  Specific Gravity: 1.038  Protein, Urine: Trace mg/dL  Glucose Qualitative, Urine: >=1000 mg/dL  Ketone - Urine: >=160 mg/dL  Blood, Urine: Negative  Bilirubin: Negative  Urobilinogen: 0.2 mg/dL  Leukocyte Esterase Concentration: Negative  Nitrite: Negative  White Blood Cell - Urine: 0 /HPF  Red Blood Cell - Urine: 0 /HPF  Bacteria: Negative /HPF  Cast: 0 /LPF  Epithelial Cells: 0 /HPF  Urinalysis Basic - ( 10 Feb 2024 10:30 )    MICROBIOLOGY:  2/10 UC pending  2/10 Abscess culture pending    RADIOLOGY:  imaging below personally reviewed and agree with findings    CT Abdomen and Pelvis w/ IV Cont (02.10.24 @ 06:18) >  IMPRESSION:  Perianal abscess measuring 2.1 x 1.5 x 2.6 cm, seen at the anal verge.  7 mm cystic lesion of the pancreatic head. One year follow-up advised, preferably with MRI.  Right pelvic renal transplant with hydronephrosis.  Probable constipation.  Question gastritis versus underdistention.    US Duplex Venous Lower Ext Complete, Bilateral (02.10.24 @ 05:51) >  Acute deep venous thrombosis: above and below the knee.

## 2024-02-10 NOTE — CONSULT NOTE ADULT - ATTENDING COMMENTS
38M with HTN and DM, renal transplant admitted 2/9 and found be in DKA.    WBC 11.16  Creatinine 0.74  UA (-)  UC pending  CT with perianal 2.1 x 1.5 x 2.6 abscess   s/p I+D  culture pending 38M with HTN and DM, renal transplant (3 years ago in [place]) admitted 2/9 with rectal pain and found be in DKA.    WBC 11.16  Creatinine 0.74  UA (-)  UC pending  CT with perianal 2.1 x 1.5 x 2.6 abscess   s/p I+D  culture pending    Overall, 38M with renal transplant (on prednisone / cellcept) here with perirectal abscess in DKA with leukocytosis s/p I+D, also with DVT  - zosyn for now  - can add vancomycin 1 q12  - f/u I+D cultures  - glycemic control  - trend wbc 38M with HTN, hx ESRD on HD via LUE AVF for 13 years until renal transplant 3 years ago at [facility].  Post-transplant developed DM.  Now admitted 2/9 with rectal pain and found be in DKA.    WBC 11.16  Creatinine 0.74  UA (-)  UC pending  CT with perianal 2.1 x 1.5 x 2.6 abscess   s/p I+D  culture pending    Overall, 38M with renal transplant (on prednisone / cellcept) here with perirectal abscess in DKA with leukocytosis s/p I+D, also with DVT  - zosyn for now  - can add vancomycin 1 q12  - f/u I+D cultures  - glycemic control  - trend wbc

## 2024-02-10 NOTE — CONSULT NOTE ADULT - ASSESSMENT
Mr. Montes is a 38 year old male with a PMHx of T2DM, renal transplant who presents for perineal lesion. Endocrinology consulted for management of DKA.    Poorly controlled T2DM with hyperglycemia  DKA  - HbA1c: 12.2  - Home Regimen:   - Endocrinologist:  - presented with , AG 28, bicarb 8, pH 7.23, UA >160 consistent with DKA  PLAN  - Hold oral DM agents while inpatient  - given DKA, check zinc transporter8, IA2 antibody, NAZ 65  - Start Lantus  units at bedtime. DO NOT HOLD IF NPO.  - Start Admelog  units TID pre-meal. HOLD IF NPO.  - Use moderate/Use low dose Admelog correction scale pre-meal  - Use moderate/Use low dose Admelog correction scale at bedtime  - Fingerstick BG before meals and bedtime  - Goal -180  - Carbohydrate consistent diet  - RD consult  Discharge plan:  - Discharge medications: ************************  - Patient to call doctor with persistent high or low BG at home.   - Ensure patient has glucometer, test strips and lancets on discharge.  - Recommend routine outpatient ophthalmology, podiatry and endocrinology f/u    HTN  - Home regimen: not on medication per chart review  PLAN  - Can check urine microalbumin outpatient  - Outpatient goal BP <130/80. Management per primary team.    HLD  - Home regimen: not on statin per chart review  PLAN  - Would likely benefit from a statin if no contraindication  - Can check lipid profile if not done recently    Discussed with primary team.    Juan R Carmona MD, Endocrinology Fellow  Pager [phone] from 9am to 5pm. After hours and on weekends, please call [phone].   Mr. Montes is a 38 year old male with a PMHx of T2DM, renal transplant who presents for perineal lesion. Endocrinology consulted for management of DKA.    Poorly controlled T2DM with hyperglycemia  DKA  - HbA1c: 12.2  - Home Regimen:  lantus 18 units at bedtime, humalog 8 units with meals, previously on a pill (does not remember the name)  - Endocrinologist: Dr. Merino  - presented with , AG 28, bicarb 8, pH 7.23, UA >160 consistent with DKA  PLAN  - Hold oral DM agents while inpatient  - given DKA, check zinc transporter8, IA2 antibody, NAZ 65  - Start Lantus  units at bedtime. DO NOT HOLD IF NPO.  - Start Admelog  units TID pre-meal. HOLD IF NPO.  - Use moderate/Use low dose Admelog correction scale pre-meal  - Use moderate/Use low dose Admelog correction scale at bedtime  - Fingerstick BG before meals and bedtime  - Goal -180  - Carbohydrate consistent diet  - RD consult  Discharge plan:  - Discharge medications: ************************  - Patient to call doctor with persistent high or low BG at home.   - Ensure patient has glucometer, test strips and lancets on discharge.  - Recommend routine outpatient ophthalmology, podiatry and endocrinology f/u    HTN  - Home regimen: not on medication per chart review  PLAN  - Can check urine microalbumin outpatient  - Outpatient goal BP <130/80. Management per primary team.    HLD  - Home regimen: not on statin per chart review  PLAN  - Would likely benefit from a statin if no contraindication  - Can check lipid profile if not done recently    Discussed with primary team.    Juan R Carmona MD, Endocrinology Fellow  Pager [phone] from 9am to 5pm. After hours and on weekends, please call [phone].   Mr. Montes is a 38 year old male with a PMHx of T2DM, renal transplant who presents for perineal lesion. Endocrinology consulted for management of DKA.    Poorly controlled T2DM with hyperglycemia  DKA  - HbA1c: 12.2  - Home Regimen:  lantus 18 units at bedtime, humalog 8 units with meals, previously on a pill (does not remember the name)  - Endocrinologist: Dr. Merino  - presented with , AG 28, bicarb 8, pH 7.23, UA >160 consistent with DKA  - patients home regimen is significantly above weight based regimen and reports -400s at home and presented in DKA, adamant he has been compliant  PLAN  - given DKA, check zinc transporter8, IA2 antibody, NAZ 65  - continue insulin gtt, q1h fingersticks, q4h DKA labs, fluids per protocol, given unclear requirements as above that home regimen significantly above weight based and presented in dka, would keep patient on insulin gtt until on a drip rate for around 6 hrs to assess his actual requirement to assist with transitioning off the drip, our team can provide assistance tomorrow after assessing his requirements on the insulin gtt  - Carbohydrate consistent diet when able  - RD consult  Discharge plan:  - Discharge medications: basal/bolus doses tbd  - Patient to call doctor with persistent high or low BG at home.   - Ensure patient has glucometer, test strips and lancets on discharge.  - Recommend routine outpatient ophthalmology, podiatry and endocrinology f/u    HTN  - Home regimen: not on medication per chart review  PLAN  - Can check urine microalbumin outpatient  - Outpatient goal BP <130/80. Management per primary team.    HLD  - Home regimen: not on statin per chart review  PLAN  - Would likely benefit from a statin if no contraindication  - Can check lipid profile if not done recently    Discussed with primary team.    Juan R Carmona MD, Endocrinology Fellow  Pager [phone] from 9am to 5pm. After hours and on weekends, please call [phone].

## 2024-02-10 NOTE — CONSULT NOTE ADULT - SUBJECTIVE AND OBJECTIVE BOX
ENDOCRINE INITIAL CONSULT - dka    HPI:  Mr. Montes is a 38 year old male with a PMHx of T2DM, renal transplant who presents for perineal lesion.    ENDOCRINE HISTORY   Diagnosed with DM:   Last HbA1c:   Endocrinologist:   Home DM Meds:  Adherence:  Microvascular complications: denies retinopathy, nephropathy, neuropathy  Macrovascular complications: denies MI or CVA  SMBG:  Symptoms:  Hypoglycemia episodes:  BG at home:  Diet at home:  Appetite in hospital:  Exercise:  PMHx:  PSHx:  Family hx:  Social hx:  Insurance:  Resides in:      PAST MEDICAL & SURGICAL HISTORY:  DM (diabetes mellitus)      S/P kidney transplant        Home Medications:      MEDICATIONS  (STANDING):  dextrose 5% + sodium chloride 0.9%. 1000 milliLiter(s) (125 mL/Hr) IV Continuous <Continuous>  dextrose 50% Injectable 25 Gram(s) IV Push once  potassium chloride  10 mEq/100 mL IVPB 10 milliEquivalent(s) IV Intermittent every 1 hour  sodium chloride 0.9%. 1000 milliLiter(s) (150 mL/Hr) IV Continuous <Continuous>    MEDICATIONS  (PRN):      Allergies    penicillin (Hives; Angioedema)    Intolerances        REVIEW OF SYSTEMS  Constitutional: No fever  Eyes: No blurry vision  Neuro: No tremors  HEENT: No pain  Cardiovascular: No chest pain, palpitations  Respiratory: No SOB, no cough  GI: No nausea, vomiting, abdominal pain  : No dysuria  Skin: no rash  Psych: no depression  Endocrine: no polyuria, polydipsia  Hem/lymph: no swelling  Osteoporosis: no fractures  ALL OTHER SYSTEMS REVIEWED AND NEGATIVE     UNABLE TO OBTAIN     PHYSICAL EXAM   Vital Signs Last 24 Hrs  T(C): 36.9 (10 Feb 2024 12:50), Max: 37.1 (10 Feb 2024 00:25)  T(F): 98.5 (10 Feb 2024 12:50), Max: 98.7 (10 Feb 2024 00:25)  HR: 81 (10 Feb 2024 13:00) (81 - 98)  BP: 127/94 (10 Feb 2024 13:00) (102/65 - 149/76)  BP(mean): 104 (10 Feb 2024 13:00) (104 - 107)  RR: 13 (10 Feb 2024 13:00) (13 - 19)  SpO2: 98% (10 Feb 2024 13:00) (97% - 100%)    Parameters below as of 10 Feb 2024 13:00  Patient On (Oxygen Delivery Method): room air      GENERAL: NAD, well-groomed, well-developed  EYES: No proptosis, no lid lag, anicteric  HEENT:  Atraumatic, Normocephalic, moist mucous membranes  THYROID: Normal size, no palpable nodules  RESPIRATORY: Clear to auscultation bilaterally; No rales, rhonchi, wheezing  CARDIOVASCULAR: Regular rate and rhythm; No murmurs  GI: Soft, nontender, non distended, normal bowel sounds  SKIN: Dry, intact  MUSCULOSKELETAL: Moving extremities spontaneously, no peripheral edema  NEURO: sensation intact, extraocular movements intact, no tremor  PSYCH: Answering questions appropriately, normal affect, normal mood  CUSHING'S SIGNS: no striae, no acanthosis, no dorsocervical fat pad    CAPILLARY BLOOD GLUCOSE      POCT Blood Glucose.: 135 mg/dL (10 Feb 2024 12:53)  POCT Blood Glucose.: 154 mg/dL (10 Feb 2024 11:35)  POCT Blood Glucose.: 205 mg/dL (10 Feb 2024 10:32)  POCT Blood Glucose.: 227 mg/dL (10 Feb 2024 09:32)  POCT Blood Glucose.: 267 mg/dL (10 Feb 2024 08:36)  POCT Blood Glucose.: 306 mg/dL (10 Feb 2024 07:04)  POCT Blood Glucose.: 416 mg/dL (10 Feb 2024 00:42)      A1C with Estimated Average Glucose Result: 12.2 % (02-10-24 @ 03:30)                            14.9   11.16 )-----------( 166      ( 10 Feb 2024 03:30 )             41.6       02-10    137  |  107  |  18  ----------------------------<  201<H>  3.1<L>   |  13<L>  |  0.74    Ca    9.1      10 Feb 2024 10:30    TPro  6.1  /  Alb  3.0<L>  /  TBili  0.4  /  DBili  x   /  AST  56<H>  /  ALT  139<H>  /  AlkPhos  83  02-10          LIPIDS    RADIOLOGY ENDOCRINE INITIAL CONSULT - dka    HPI:  Mr. Montes is a 38 year old male with a PMHx of T2DM, renal transplant who presents for perineal lesion.    ENDOCRINE HISTORY   Diagnosed with DM:   Last HbA1c:   Endocrinologist:   Home DM Meds:  Adherence:  Microvascular complications: denies retinopathy, nephropathy, neuropathy  Macrovascular complications: denies MI or CVA  SMBG:  Symptoms:  Hypoglycemia episodes:  BG at home:  Diet at home:  Appetite in hospital:  Exercise:  PMHx:  PSHx:  Family hx:  Social hx:  Insurance: [employer]  Resides in: [place]      PAST MEDICAL & SURGICAL HISTORY:  DM (diabetes mellitus)      S/P kidney transplant        Home Medications:      MEDICATIONS  (STANDING):  dextrose 5% + sodium chloride 0.9%. 1000 milliLiter(s) (125 mL/Hr) IV Continuous <Continuous>  dextrose 50% Injectable 25 Gram(s) IV Push once  potassium chloride  10 mEq/100 mL IVPB 10 milliEquivalent(s) IV Intermittent every 1 hour  sodium chloride 0.9%. 1000 milliLiter(s) (150 mL/Hr) IV Continuous <Continuous>    MEDICATIONS  (PRN):      Allergies    penicillin (Hives; Angioedema)    Intolerances        REVIEW OF SYSTEMS  Constitutional: No fever  Eyes: No blurry vision  Neuro: No tremors  HEENT: No pain  Cardiovascular: No chest pain, palpitations  Respiratory: No SOB, no cough  GI: No nausea, vomiting, abdominal pain  : No dysuria  Skin: no rash  Psych: no depression  Endocrine: no polyuria, polydipsia  Hem/lymph: no swelling  Osteoporosis: no fractures  ALL OTHER SYSTEMS REVIEWED AND NEGATIVE     UNABLE TO OBTAIN     PHYSICAL EXAM   Vital Signs Last 24 Hrs  T(C): 36.9 (10 Feb 2024 12:50), Max: 37.1 (10 Feb 2024 00:25)  T(F): 98.5 (10 Feb 2024 12:50), Max: 98.7 (10 Feb 2024 00:25)  HR: 81 (10 Feb 2024 13:00) (81 - 98)  BP: 127/94 (10 Feb 2024 13:00) (102/65 - 149/76)  BP(mean): 104 (10 Feb 2024 13:00) (104 - 107)  RR: 13 (10 Feb 2024 13:00) (13 - 19)  SpO2: 98% (10 Feb 2024 13:00) (97% - 100%)    Parameters below as of 10 Feb 2024 13:00  Patient On (Oxygen Delivery Method): room air      GENERAL: NAD, well-groomed, well-developed  EYES: No proptosis, no lid lag, anicteric  HEENT:  Atraumatic, Normocephalic, moist mucous membranes  THYROID: Normal size, no palpable nodules  RESPIRATORY: Clear to auscultation bilaterally; No rales, rhonchi, wheezing  CARDIOVASCULAR: Regular rate and rhythm; No murmurs  GI: Soft, nontender, non distended, normal bowel sounds  SKIN: Dry, intact  MUSCULOSKELETAL: Moving extremities spontaneously, no peripheral edema  NEURO: sensation intact, extraocular movements intact, no tremor  PSYCH: Answering questions appropriately, normal affect, normal mood  CUSHING'S SIGNS: no striae, no acanthosis, no dorsocervical fat pad    CAPILLARY BLOOD GLUCOSE      POCT Blood Glucose.: 135 mg/dL (10 Feb 2024 12:53)  POCT Blood Glucose.: 154 mg/dL (10 Feb 2024 11:35)  POCT Blood Glucose.: 205 mg/dL (10 Feb 2024 10:32)  POCT Blood Glucose.: 227 mg/dL (10 Feb 2024 09:32)  POCT Blood Glucose.: 267 mg/dL (10 Feb 2024 08:36)  POCT Blood Glucose.: 306 mg/dL (10 Feb 2024 07:04)  POCT Blood Glucose.: 416 mg/dL (10 Feb 2024 00:42)      A1C with Estimated Average Glucose Result: 12.2 % (02-10-24 @ 03:30)                            14.9   11.16 )-----------( 166      ( 10 Feb 2024 03:30 )             41.6       02-10    137  |  107  |  18  ----------------------------<  201<H>  3.1<L>   |  13<L>  |  0.74    Ca    9.1      10 Feb 2024 10:30    TPro  6.1  /  Alb  3.0<L>  /  TBili  0.4  /  DBili  x   /  AST  56<H>  /  ALT  139<H>  /  AlkPhos  83  02-10          LIPIDS    RADIOLOGY ENDOCRINE INITIAL CONSULT - dka    HPI:  Mr. Montes is a 38 year old male with a PMHx of T2DM, renal transplant who presents for perineal lesion.    ENDOCRINE HISTORY   Diagnosed with DM: T2DM, diagnosed after the transplant 4 years ago  Last HbA1c: 12.2  Endocrinologist: Dr. Merino  Home DM Meds: lantus 18 units at bedtime, humalog 8 units with meals, previously on a pill (does not remember the name)  - on prednisone 5mg daily for the renal transplant  Adherence: denies missing doses  Microvascular complications: denies retinopathy, nephropathy, neuropathy  Macrovascular complications: denies MI or CVA  SMBG: glucometer 2 times a day, am: 300-400s, bedtime: 300-400s, denies hypoglycemia  Symptoms: endorses polyuria, polydipsia  Diet at home:  - Breakfast: oatmeal  - Lunch: salad, rice  - Dinner: green plantains  - Denies juice or soda  - denies snacks  Appetite in hospital: improving   Exercise: "a little"  PMHx: as above  PSHx: as above  Family hx: uncle with diabetes (unsure of type), denies family history of thyroid disease  Social hx: denies tobacco use, alcohol use, drug use  Insurance: [employer]  Resides in: [place]      PAST MEDICAL & SURGICAL HISTORY:  DM (diabetes mellitus)      S/P kidney transplant        Home Medications:      MEDICATIONS  (STANDING):  dextrose 5% + sodium chloride 0.9%. 1000 milliLiter(s) (125 mL/Hr) IV Continuous <Continuous>  dextrose 50% Injectable 25 Gram(s) IV Push once  potassium chloride  10 mEq/100 mL IVPB 10 milliEquivalent(s) IV Intermittent every 1 hour  sodium chloride 0.9%. 1000 milliLiter(s) (150 mL/Hr) IV Continuous <Continuous>    MEDICATIONS  (PRN):      Allergies    penicillin (Hives; Angioedema)    Intolerances        REVIEW OF SYSTEMS  Constitutional: No fever  Eyes: No blurry vision  Neuro: No tremors  HEENT: No pain  Cardiovascular: No chest pain, palpitations  Respiratory: No SOB, no cough  GI: No nausea, vomiting, abdominal pain  : No dysuria  Skin: no rash  Psych: no depression  Endocrine: endorses polyuria, polydipsia  Hem/lymph: no swelling  Osteoporosis: no fractures  ALL OTHER SYSTEMS REVIEWED AND NEGATIVE     PHYSICAL EXAM   Vital Signs Last 24 Hrs  T(C): 36.9 (10 Feb 2024 12:50), Max: 37.1 (10 Feb 2024 00:25)  T(F): 98.5 (10 Feb 2024 12:50), Max: 98.7 (10 Feb 2024 00:25)  HR: 81 (10 Feb 2024 13:00) (81 - 98)  BP: 127/94 (10 Feb 2024 13:00) (102/65 - 149/76)  BP(mean): 104 (10 Feb 2024 13:00) (104 - 107)  RR: 13 (10 Feb 2024 13:00) (13 - 19)  SpO2: 98% (10 Feb 2024 13:00) (97% - 100%)    Parameters below as of 10 Feb 2024 13:00  Patient On (Oxygen Delivery Method): room air      GENERAL: NAD, well-groomed, well-developed  EYES: No proptosis, no lid lag, anicteric  HEENT:  Atraumatic, Normocephalic, moist mucous membranes  THYROID: Normal size, no palpable nodules  RESPIRATORY: Clear to auscultation bilaterally; No rales, rhonchi, wheezing  CARDIOVASCULAR: Regular rate and rhythm; No murmurs  GI: Soft, nontender, non distended, normal bowel sounds  SKIN: Dry, intact  MUSCULOSKELETAL: Moving extremities spontaneously, no peripheral edema  NEURO: sensation intact, extraocular movements intact, no tremor  PSYCH: Answering questions appropriately, normal affect, normal mood  CUSHING'S SIGNS: no striae, no acanthosis, no dorsocervical fat pad    CAPILLARY BLOOD GLUCOSE      POCT Blood Glucose.: 135 mg/dL (10 Feb 2024 12:53)  POCT Blood Glucose.: 154 mg/dL (10 Feb 2024 11:35)  POCT Blood Glucose.: 205 mg/dL (10 Feb 2024 10:32)  POCT Blood Glucose.: 227 mg/dL (10 Feb 2024 09:32)  POCT Blood Glucose.: 267 mg/dL (10 Feb 2024 08:36)  POCT Blood Glucose.: 306 mg/dL (10 Feb 2024 07:04)  POCT Blood Glucose.: 416 mg/dL (10 Feb 2024 00:42)      A1C with Estimated Average Glucose Result: 12.2 % (02-10-24 @ 03:30)                            14.9   11.16 )-----------( 166      ( 10 Feb 2024 03:30 )             41.6       02-10    137  |  107  |  18  ----------------------------<  201<H>  3.1<L>   |  13<L>  |  0.74    Ca    9.1      10 Feb 2024 10:30    TPro  6.1  /  Alb  3.0<L>  /  TBili  0.4  /  DBili  x   /  AST  56<H>  /  ALT  139<H>  /  AlkPhos  83  02-10          LIPIDS    RADIOLOGY ENDOCRINE INITIAL CONSULT - dka    HPI:  Mr. Montes is a 38 year old male with a PMHx of T2DM, renal transplant who presents for perineal lesion.    ENDOCRINE HISTORY   Diagnosed with DM: T2DM, diagnosed after the transplant 4 years ago  Last HbA1c: 12.2  Endocrinologist: Dr. Merino  Home DM Meds: lantus 18 units at bedtime, humalog 8 units with meals, previously on a pill (does not remember the name)  - on prednisone 5mg daily for the renal transplant  Adherence: denies missing doses  Microvascular complications: denies retinopathy, nephropathy, neuropathy  Macrovascular complications: denies MI or CVA  SMBG: glucometer 2 times a day, am: 300-400s, bedtime: 300-400s, denies hypoglycemia  Symptoms: endorses polyuria, polydipsia  Diet at home:  - Breakfast: oatmeal  - Lunch: salad, rice  - Dinner: green plantains  - Denies juice or soda  - denies snacks  Appetite in hospital: improving   Exercise: "a little"  PMHx: as above  PSHx: as above  Family hx: uncle with diabetes (unsure of type), denies family history of thyroid disease  Social hx: denies tobacco use, alcohol use, drug use  Insurance: [employer]  Resides in: [place]      PAST MEDICAL & SURGICAL HISTORY:  DM (diabetes mellitus)      S/P kidney transplant        Home Medications:      MEDICATIONS  (STANDING):  dextrose 5% + sodium chloride 0.9%. 1000 milliLiter(s) (125 mL/Hr) IV Continuous <Continuous>  dextrose 50% Injectable 25 Gram(s) IV Push once  potassium chloride  10 mEq/100 mL IVPB 10 milliEquivalent(s) IV Intermittent every 1 hour  sodium chloride 0.9%. 1000 milliLiter(s) (150 mL/Hr) IV Continuous <Continuous>    MEDICATIONS  (PRN):      Allergies    penicillin (Hives; Angioedema)    Intolerances        REVIEW OF SYSTEMS  Constitutional: No fever  Eyes: No blurry vision  Neuro: No tremors  HEENT: No pain  Cardiovascular: No chest pain, palpitations  Respiratory: No SOB, no cough  GI: No nausea, vomiting, abdominal pain  : No dysuria  Skin: no rash  Psych: no depression  Endocrine: endorses polyuria, polydipsia  Hem/lymph: no swelling  Osteoporosis: no fractures  ALL OTHER SYSTEMS REVIEWED AND NEGATIVE     PHYSICAL EXAM   Vital Signs Last 24 Hrs  T(C): 36.9 (10 Feb 2024 12:50), Max: 37.1 (10 Feb 2024 00:25)  T(F): 98.5 (10 Feb 2024 12:50), Max: 98.7 (10 Feb 2024 00:25)  HR: 81 (10 Feb 2024 13:00) (81 - 98)  BP: 127/94 (10 Feb 2024 13:00) (102/65 - 149/76)  BP(mean): 104 (10 Feb 2024 13:00) (104 - 107)  RR: 13 (10 Feb 2024 13:00) (13 - 19)  SpO2: 98% (10 Feb 2024 13:00) (97% - 100%)    Parameters below as of 10 Feb 2024 13:00  Patient On (Oxygen Delivery Method): room air      GENERAL: NAD, lying in bed  EYES: No proptosis, no lid lag, anicteric  HEENT:  Atraumatic, Normocephalic, dry mucous membranes  THYROID: Normal size, no palpable nodules  RESPIRATORY: Clear to auscultation bilaterally; No rales, rhonchi, wheezing  CARDIOVASCULAR: Regular rate and rhythm; No murmurs  GI: Soft, nontender, non distended, normal bowel sounds  SKIN: Dry, intact  MUSCULOSKELETAL: Moving extremities spontaneously, no peripheral edema  NEURO: sensation intact, extraocular movements intact, no tremor  PSYCH: Answering questions appropriately, normal affect, normal mood  CUSHING'S SIGNS: no striae, no acanthosis, no dorsocervical fat pad    CAPILLARY BLOOD GLUCOSE      POCT Blood Glucose.: 135 mg/dL (10 Feb 2024 12:53)  POCT Blood Glucose.: 154 mg/dL (10 Feb 2024 11:35)  POCT Blood Glucose.: 205 mg/dL (10 Feb 2024 10:32)  POCT Blood Glucose.: 227 mg/dL (10 Feb 2024 09:32)  POCT Blood Glucose.: 267 mg/dL (10 Feb 2024 08:36)  POCT Blood Glucose.: 306 mg/dL (10 Feb 2024 07:04)  POCT Blood Glucose.: 416 mg/dL (10 Feb 2024 00:42)      A1C with Estimated Average Glucose Result: 12.2 % (02-10-24 @ 03:30)                            14.9   11.16 )-----------( 166      ( 10 Feb 2024 03:30 )             41.6       02-10    137  |  107  |  18  ----------------------------<  201<H>  3.1<L>   |  13<L>  |  0.74    Ca    9.1      10 Feb 2024 10:30    TPro  6.1  /  Alb  3.0<L>  /  TBili  0.4  /  DBili  x   /  AST  56<H>  /  ALT  139<H>  /  AlkPhos  83  02-10          LIPIDS    RADIOLOGY

## 2024-02-10 NOTE — CONSULT NOTE ADULT - SUBJECTIVE AND OBJECTIVE BOX
Surgery Consult Note  Attending: Chris  Service: A team  [id number]      HPI: 38y Male PMH kidney transplant, DMT2 coming in for 4 days of perirectal pain. Patient has never has a similar issue before, has not had a colonoscopy and has no family or personal history of IBD. In the ED found to be in DKA and also DVT study with above and below knee DVT. Denies fevers, chills, chest pain, dyspnea, headache, nausea, emesis.   CTAP with 2cm perirectal abcess.       PAST MEDICAL HISTORY:  PAST MEDICAL & SURGICAL HISTORY:  DM (diabetes mellitus)      S/P kidney transplant          ALLERGIES:  Allergies    penicillin (Hives; Angioedema)    Intolerances        SOCIAL HISTORY: Negative for tobacco, etoh, or drug use    FAMILY HISTORY:  FAMILY HISTORY:      PHYSICAL EXAM:  General: NAD, resting comfortably  HEENT: NC/AT, EOMI, normal hearing, no oral lesions, no LAD, neck supple  Pulmonary: normal resp effort, CTA-B  Cardiovascular: NSR, no murmurs  Abdominal: soft, ND/NT, no organomegaly, no guarding or rebound tenderness  FRAN: tender swollen area anterior to sphincter, fluctuance noted.   Extremities: WWP, normal strength, no clubbing/cyanosis/edema  Neuro: A/O x 3, CNs II-XII grossly intact, normal sensation, no focal deficits      VITAL SIGNS:  Vital Signs Last 24 Hrs  T(C): 36.7 (10 Feb 2024 08:40), Max: 37.1 (10 Feb 2024 00:25)  T(F): 98 (10 Feb 2024 08:40), Max: 98.7 (10 Feb 2024 00:25)  HR: 85 (10 Feb 2024 08:40) (85 - 98)  BP: 149/76 (10 Feb 2024 08:40) (102/65 - 149/76)  BP(mean): --  RR: 15 (10 Feb 2024 08:40) (14 - 18)  SpO2: 100% (10 Feb 2024 08:40) (97% - 100%)    Parameters below as of 10 Feb 2024 08:40  Patient On (Oxygen Delivery Method): room air        I&O's Summary      LABS:                        14.9   11.16 )-----------( 166      ( 10 Feb 2024 03:30 )             41.6     02-10    133<L>  |  97<L>  |  24<H>  ----------------------------<  407<H>  4.6   |  8<LL>  |  1.09    Ca    10.1      10 Feb 2024 03:30    TPro  6.9  /  Alb  3.6  /  TBili  0.5  /  DBili  x   /  AST  73<H>  /  ALT  171<H>  /  AlkPhos  108  02-10    PT/INR - ( 10 Feb 2024 03:30 )   PT: 11.3 sec;   INR: 1.01 ratio         PTT - ( 10 Feb 2024 03:30 )  PTT:27.0 sec  Urinalysis Basic - ( 10 Feb 2024 03:30 )    Color: x / Appearance: x / SG: x / pH: x  Gluc: 407 mg/dL / Ketone: x  / Bili: x / Urobili: x   Blood: x / Protein: x / Nitrite: x   Leuk Esterase: x / RBC: x / WBC x   Sq Epi: x / Non Sq Epi: x / Bacteria: x      CAPILLARY BLOOD GLUCOSE      POCT Blood Glucose.: 227 mg/dL (10 Feb 2024 09:32)  POCT Blood Glucose.: 267 mg/dL (10 Feb 2024 08:36)  POCT Blood Glucose.: 306 mg/dL (10 Feb 2024 07:04)  POCT Blood Glucose.: 416 mg/dL (10 Feb 2024 00:42)    LIVER FUNCTIONS - ( 10 Feb 2024 03:30 )  Alb: 3.6 g/dL / Pro: 6.9 g/dL / ALK PHOS: 108 U/L / ALT: 171 U/L / AST: 73 U/L / GGT: x             CULTURES:      RADIOLOGY & ADDITIONAL STUDIES:    < from: CT Abdomen and Pelvis w/ IV Cont (02.10.24 @ 06:18) >    ACC: [id number] EXAM:  CT ABDOMEN AND PELVIS IC   ORDERED BY: JOSE D QUINONES     PROCEDURE DATE:  02/10/2024          INTERPRETATION:  CLINICAL INFORMATION: Concern for perirectal abscess.    COMPARISON: None.    CONTRAST/COMPLICATIONS:  IV Contrast: Omnipaque 350  75 cc administered   25 cc discarded  Oral Contrast: NONE  Complications: None reported at time of study completion    PROCEDURE:  CT of the Abdomen and Pelvis was performed.  Sagittal and coronal reformats were performed.    FINDINGS:  LOWER CHEST: Within normal limits.    LIVER: Within normal limits.  BILE DUCTS: Normal caliber.  GALLBLADDER: Within normal limits.  SPLEEN: Within normal limits.  PANCREAS: 7 mm cystic lesion within the pancreatic head.  ADRENALS: Within normal limits.  KIDNEYS/URETERS: Atrophic left kidney. Status post right nephrectomy.   Right pelvis transplant kidney. There is hydronephrosis of the transplant   kidney.    BLADDER: Within normal limits.  REPRODUCTIVE ORGANS: Prostate within normal limits.    BOWEL: No bowel obstruction. Appendix is normal. Large amount of stool   compatible with constipation. Question wall thickening of the stomach   versus underdistention. Complex rim-enhancing fluid collection along the   distal anal verge measuring 2.1 x 1.5 x 2.6 cm, consistent with a   perianal abscess.  PERITONEUM: No ascites.  VESSELS: Atherosclerotic changes.  RETROPERITONEUM/LYMPH NODES: No lymphadenopathy.  ABDOMINAL WALL: Within normal limits.  BONES: Degenerative changes.    IMPRESSION:  Perianal abscess measuring 2.1 x 1.5 x 2.6 cm, seen at the anal verge    7 mm cystic lesion of the pancreatic head. One year follow-up advised,   preferably with MRI.    Right pelvic renal transplant with hydronephrosis.    Probable constipation.    Question gastritis versus underdistention.    --- End of Report ---    < end of copied text >

## 2024-02-10 NOTE — ED PROVIDER NOTE - CARE PLAN
1 Principal Discharge DX:	DKA (diabetic ketoacidosis)  Secondary Diagnosis:	Acute DVT (deep venous thrombosis)  Secondary Diagnosis:	Perianal abscess

## 2024-02-10 NOTE — ED PROVIDER NOTE - OBJECTIVE STATEMENT
HPI & ROS: 38-year-old male status post kidney transplant, diabetes type 2 on insulin, coming in as sent by MD.  Difficult to elucidate story from the patient, but the patient basically for the last week has had a painful lump in his perineum region.  difficult to stool due to pain.  Patient not taking any pain medications at home.  Patient also with chronic meaning over 2 months old painless lesion behind right knee.  Patient was scheduled to have a DVT study but instead came here.  No fevers no chills.  No nausea no vomiting.  Sugars have been controlled at home, sugar provide appreciated at 450 in triage today.  No abdominal pain.  No penile discharge, no dysuria.  No history of STIs STDs.

## 2024-02-10 NOTE — ED ADULT NURSE NOTE - ED STAT RN HANDOFF DETAILS
Report handed off to KRISHAN Caraballo pt in [facility], to be transported with ED tech and float RN to CTICU 7.

## 2024-02-10 NOTE — CONSULT NOTE ADULT - ASSESSMENT
38 year old male PMH DM and kidney transplant presents for tender lump near anus incidentally found to be in DKA and have DVT in ED. Colorectal surgery consulted for perirectal abscess.    Plan:  - Bedside Incision and drainage of perirectal abscess to be completed in ED  - consent to be obtained  - will send wound cultures  - patient should follow up with Dr. Jose Perez as an outpatient    Discussed with Dr. Perez    A Team [id number] 38 year old male PMH DM and kidney transplant presents for tender lump near anus incidentally found to be in DKA and have DVT in ED. Colorectal surgery consulted for perirectal abscess.    Plan:  - Bedside Incision and drainage of perirectal abscess to be completed in ED  - consent to be obtained  - will send wound cultures  - if possible could hold AC for 2-3 hours post I+D  - patient should follow up with Dr. Jose Perez as an outpatient    Discussed with Dr. Perez    A Team [id number]

## 2024-02-10 NOTE — PROCEDURE NOTE - NSICDXPROCEDURE_GEN_ALL_CORE_FT
PROCEDURES:  Incision and drainage of rectal or perirectal abscess 10-Feb-2024 11:19:32  Carla العلي

## 2024-02-10 NOTE — H&P ADULT - NSHPSOURCEINFORD_GEN_ALL_CORE
7/5/2023         RE: Deborah Mireles  [address]  [place] [address]        Dear Colleague,    Thank you for referring your patient, Deborah Mireles, to the [facility]. Please see a copy of my visit note below.    Pulmonary Clinic Outpatient Consultation    Assessment and Plan:   83 year old female never smoker with a history of HTN, peripheral neuropathy, osteoporosis, RLS, spinal stenosis, osteoarthritis, chronic cough, recently diagnosed gastritis and hiatal hernia, presenting for evaluation.    Chronic cough: Likely cough-variant laryngopharyngeal reflux, especially with gastritis and hiatal hernia on recent EGD. DDx includes upper airway cough syndrome due to chronic rhinosinusitis (though history does not strongly support this), asthma (also no evidence of this by history, exam, or pulmonary function testing, which is normal). She is already on a decent dose of gabapentin for peripheral neuropathy, so this will not be a backup option for refractory idiopathic chronic cough.    Plan:  - start omeprazole 20 mg daily  - follow up in 3 months  - if partially effective, increase dose to 40 mg daily  - if ineffective, consider sinus CT, methacholine challenge, possible empiric trial of nasal steroid and oral antihistamine  - recommend remaining up to date with respiratory vaccinations  - encouraged her to contact us with questions or concerning symptoms    Lucas Lopez MD  [facility]  Office [phone]  Pager [phone]  he/him    CCx: chronic cough    HPI: 83 year old female never smoker with a history of HTN, peripheral neuropathy, osteoporosis, RLS, spinal stenosis, osteoarthritis, chronic cough, recently diagnosed gastritis and hiatal hernia, presenting for evaluation. Cough started gradually in December 2022. No clear precipitating events or illnesses. Feels excess phlegm in throat. Cough sometimes dry, sometimes produces  Chart(s)/Patient greenish-yellow sputum. Does feel postnasal drainage but no sinus pressure or rhinorrhea. No burning chest pain or sour/metallic taste. Notes occasional wheezing. No dyspnea. Gradually worsened. Went to [facility] late May 2023, CXR was clear except for elevated right hemidiaphragm. Was prescribed prednisone burst, benzonatate and albuterol HFA, all of which transiently helped. No known seasonal allergies. No childhood asthma. No toxin/fume exposures. She underwent EGD in late June 2023 which showed gastritis, likely hiatal hernia, and GE junction biopsy was benign.    ROS:  A 12-system review was obtained and was negative with the exception of the symptoms endorsed in the history of present illness.    PMH:  HTN  peripheral neuropathy  Osteoporosis  RLS  spinal stenosis  Osteoarthritis  chronic cough  Gastritis  hiatal hernia  Elevated right hemidiaphragm    PSH:  Past Surgical History:   Procedure Laterality Date     ABDOMEN SURGERY       APPENDECTOMY       BACK SURGERY       CHOLECYSTECTOMY       COSMETIC SURGERY      blepharoplasty     ESOPHAGOSCOPY, GASTROSCOPY, DUODENOSCOPY (EGD), COMBINED N/A 6/27/2023    Procedure: ESOPHAGOGASTRODUODENOSCOPY, WITH BIOPSY;  Surgeon: Sabas Georges DO;  Location: [facility] OR     EYE SURGERY       FRACTURE SURGERY       HC DILATION/CURETTAGE DIAG/THER NON OB      Description: Dilation And Curettage;  Recorded: 02/02/2010;  Comments: due to miscarriage     HC KNEE SCOPE, DIAGNOSTIC      Description: Arthroscopy Knee Right;  Recorded: 02/02/2010;  Comments: right knee - arthroscopy for debridement; then Synvisc     LUMBAR FUSION  08/01/2013       Allergies:  No Known Allergies    Family HX:  No family history on file.    Social Hx:  Social History     Socioeconomic History     Marital status:      Spouse name: Not on file     Number of children: Not on file     Years of education: Not on file     Highest education level: Not on file   Occupational History  "    Not on file   Tobacco Use     Smoking status: Never     Smokeless tobacco: Never   Vaping Use     Vaping Use: Never used   Substance and Sexual Activity     Alcohol use: No     Drug use: No     Sexual activity: Not on file   Other Topics Concern     Not on file   Social History Narrative     Not on file     Social Determinants of Health     Financial Resource Strain: Not on file   Food Insecurity: Not on file   Transportation Needs: Not on file   Physical Activity: Not on file   Stress: Not on file   Social Connections: Not on file   Intimate Partner Violence: Not on file   Housing Stability: Not on file       Current Meds:  Current Outpatient Medications   Medication Sig Dispense Refill     calcium carbonate-vitamin D3 (CALCIUM 600 + D,3,) 600 mg(1,500mg) -200 unit per tablet [CALCIUM CARBONATE-VITAMIN D3 (CALCIUM 600 + D,3,) 600 MG(1,500MG) -200 UNIT PER TABLET] Take 1 tablet by mouth 2 (two) times a day. (Patient taking differently: Take 1 tablet by mouth daily)  0     gabapentin (NEURONTIN) 300 MG capsule TAKE 2 CAPSULES BY MOUTH THREE TIMES DAILY. GENERIC EQUIVALENT FOR NEURONTIN 540 capsule 3     hydrochlorothiazide (HYDRODIURIL) 25 MG tablet Take 1 tablet (25 mg) by mouth daily 90 tablet 2     MULTIVITAMIN (MULTIPLE VITAMIN ORAL) [MULTIVITAMIN (MULTIPLE VITAMIN ORAL)] Take 1 tablet by mouth daily.       nortriptyline (PAMELOR) 75 MG capsule Take 1 capsule (75 mg) by mouth At Bedtime 90 capsule 3     omeprazole (PRILOSEC) 20 MG DR capsule Take 1 capsule (20 mg) by mouth daily 30 capsule 11       Physical Exam:  /80 (BP Location: Left arm, Patient Position: Chair, Cuff Size: Adult Regular)   Pulse 104   Ht 1.6 m (5' 3\")   Wt 67.8 kg (149 lb 8 oz)   SpO2 97%   BMI 26.48 kg/m    Gen: alert, oriented, no distress  HEENT: nasal mucosa is unremarkable, no oropharyngeal lesions, no cervical or supraclavicular lymphadenopathy  CV: RRR, no M/G/R  Resp: CTAB, no focal crackles or wheezes  Skin: no " apparent rashes  Ext: no cyanosis, clubbing or edema  Neuro: alert, nonfocal    Labs:  reviewed    Imaging:  CXR (May 2023):  - images directly reviewed, formal interpretation follows:    Heart and mediastinal size are normal. The right hemidiaphragm is elevated. This is unchanged. Lungs and pleural spaces are clear. No pleural effusion or pneumothorax.    Pulmonary Function Testing  July 2023  FVC 2.41 (109%)  FEV1 2.09 (122%)  FEV1/FVC 0.87  No bronchodilator response.  RV 2.26 (102%)  TLC 4.93 (103%)  DLCOc 104%  Normal flow-volume loop    Time spent on chart and image review, meeting with the patient to obtain history, perform physical exam, discuss test results, diagnostic possibilities, further testing options, treatment plan options, and care coordination: 61 minutes      Again, thank you for allowing me to participate in the care of your patient.        Sincerely,        Lucas Lopez MD

## 2024-02-10 NOTE — CONSULT NOTE ADULT - ASSESSMENT
38 year old male with PMH kidney transplant, DMT2 presented on 2/10 for 4 days of perirectal pain.    Afebrile   Leukocytosis 11K  Hyperglycemia increase BHB, acidosis     Workup:   -CT AP: Perianal abscess measuring 2.1 x 1.5 x 2.6 cm, seen at the anal verge7 mm cystic lesion of the pancreatic head. One year follow-up advised, preferably with MRI.  Right pelvic renal transplant with hydronephrosis.Probable constipation.Question gastritis versus underdistention.  -US doppler LE: R DVT    -Colorectal surgery - s/p I&D today     Antimicrobials:   Zosyn X1     #Perirectal abscess s/p bedside I&D  #Leukocytosis   #DKA   #RLE DVT  #Kidney transplant      Recommendations:        PT TO BE SEEN. PRELIM NOTE  PENDING RECS. PLEASE WAIT FOR FINAL RECS AFTER DISCUSSION WITH ATTENDING#     38 year old male with PMH kidney transplant, DMT2 presented on 2/10 for 4 days of perirectal pain.    Afebrile   Leukocytosis 11K  Hyperglycemia increase BHB, acidosis     Workup:   -CT AP: Perianal abscess measuring 2.1 x 1.5 x 2.6 cm, seen at the anal verge7 mm cystic lesion of the pancreatic head. One year follow-up advised, preferably with MRI.  Right pelvic renal transplant with hydronephrosis.  Probable constipation.  Question gastritis versus underdistention.  -US doppler LE: R DVT    -Colorectal surgery - s/p I&D today     Antimicrobials:   Zosyn X1     #Perirectal abscess s/p bedside I&D  #Leukocytosis   #DKA   #RLE DVT  #Kidney transplant      Recommendations:        PT TO BE SEEN. PRELIM NOTE  PENDING RECS. PLEASE WAIT FOR FINAL RECS AFTER DISCUSSION WITH ATTENDING#     38 year old male with PMH kidney transplant, DMT2 presented on 2/10 for 4 days of perirectal pain.    Afebrile   Leukocytosis 11K  Hyperglycemia increase BHB, acidosis     Workup:   -CT AP: Perianal abscess measuring 2.1 x 1.5 x 2.6 cm, seen at the anal verge7 mm cystic lesion of the pancreatic head. One year follow-up advised, preferably with MRI.  Right pelvic renal transplant with hydronephrosis.  Probable constipation.  Question gastritis versus underdistention.  -US doppler LE: R DVT    -Colorectal surgery - s/p I&D today     Antimicrobials:   Zosyn X1     #Perirectal abscess s/p bedside I&D  #Leukocytosis   #DKA   #RLE DVT  #Kidney transplant    Recommendations:  -Continue Zosyn 3.375 g IV q 8hrs   -Add Vancomycin 1 g IV q12hrs   -F/up I&D Culture  -F/up blood Cx   -Monitor WBC curve     Discussed with Dr Zulma Borjas MD, PGY5  ID fellow  [employer] Teams Preferred  After 5pm/weekends call [phone]

## 2024-02-10 NOTE — ED PROVIDER NOTE - CLINICAL SUMMARY MEDICAL DECISION MAKING FREE TEXT BOX
MDM/Summary/DDx (includes but is not limited to):  38-year-old male with a history of  diabetes type 2 on insulin coming in for lump in his perineum, no crepitus to suggest necrotizing fasciitis.  Patient immunocompromised given diabetes, will CT.  Will  obtain presurgical labs, exam and history is not consistent with proctitis.  Or prostatitis.  Labs:   CBC CMP PT PTT type and screen UA UC  Imaging: DVT, CTAP  Tx: Supportive, pain/nausea medications as pt requires/requests.  Consults/Resources: none at this time, perhaps surg   Dispo: admit likely pending     Triage note reviewed. VS reviewed. EKG reviewed and documented in "RESULTS" section, if possible at given time.     DDx in MDM includes the most likely ddx, but is not limited to solely what is listed. Clinical course may alter/deviate from the above plan. When possible, progress notes written, as needed, and are included in "PROGRESS NOTE" section below.       Medical, family, and social determinants of health reviewed and discussed w/ pt/family/caretaker, when allowable, and is incorporated into note above, whenever possible.

## 2024-02-10 NOTE — ED ADULT TRIAGE NOTE - CHIEF COMPLAINT QUOTE
Pt c/o right leg pain x5 days, perineal discomfort and constipation for x4 days. it is difficult to comprehend what the patient is explaining, but pt seems to have an understanding of his medical problems. Past medical history of right kidney transplant, HTN, and DM type 2, insulin dependent. Pt reports he also needs to follow up with endocrinologist. states the referring doctor wanted him to go to a different hospital, but patient prefers to come here.

## 2024-02-10 NOTE — ED ADULT NURSE REASSESSMENT NOTE - NS ED NURSE REASSESS COMMENT FT1
Report received from night shift RN, ER team aware of patient glucose, insulin drip intiated. MICU consult at bedside assessing pt, will continue to monitor.

## 2024-02-10 NOTE — H&P ADULT - NSHPLABSRESULTS_GEN_ALL_CORE
Labs:                          14.9   11.16 )-----------( 166      ( 10 Feb 2024 03:30 )             41.6     02-10    137  |  107  |  18  ----------------------------<  201<H>  3.1<L>   |  13<L>  |  0.74    Ca    9.1      10 Feb 2024 10:30    TPro  6.1  /  Alb  3.0<L>  /  TBili  0.4  /  DBili  x   /  AST  56<H>  /  ALT  139<H>  /  AlkPhos  83  02-10    LIVER FUNCTIONS - ( 10 Feb 2024 10:30 )  Alb: 3.0 g/dL / Pro: 6.1 g/dL / ALK PHOS: 83 U/L / ALT: 139 U/L / AST: 56 U/L / GGT: x           PT/INR - ( 10 Feb 2024 03:30 )   PT: 11.3 sec;   INR: 1.01 ratio         PTT - ( 10 Feb 2024 03:30 )  PTT:27.0 sec  CAPILLARY BLOOD GLUCOSE      POCT Blood Glucose.: 155 mg/dL (10 Feb 2024 14:01)  POCT Blood Glucose.: 135 mg/dL (10 Feb 2024 12:53)  POCT Blood Glucose.: 154 mg/dL (10 Feb 2024 11:35)  POCT Blood Glucose.: 205 mg/dL (10 Feb 2024 10:32)  POCT Blood Glucose.: 227 mg/dL (10 Feb 2024 09:32)  POCT Blood Glucose.: 267 mg/dL (10 Feb 2024 08:36)  POCT Blood Glucose.: 306 mg/dL (10 Feb 2024 07:04)  POCT Blood Glucose.: 416 mg/dL (10 Feb 2024 00:42)      Blood Gas Venous Comprehensive Result: done (02-10-24 @ 10:30)  Blood Gas Venous Comprehensive Result: DONE (02-10-24 @ 05:45)          Urinalysis Basic - ( 10 Feb 2024 10:30 )    Color: x / Appearance: x / SG: x / pH: x  Gluc: 201 mg/dL / Ketone: x  / Bili: x / Urobili: x   Blood: x / Protein: x / Nitrite: x   Leuk Esterase: x / RBC: x / WBC x   Sq Epi: x / Non Sq Epi: x / Bacteria: x        Micro:      RADIOLOGY & ADDITIONAL TESTS:    US/CT/MRI:  IMPRESSION:  Perianal abscess measuring 2.1 x 1.5 x 2.6 cm, seen at the anal verge    7 mm cystic lesion of the pancreatic head. One year follow-up advised,   preferably with MRI.    Right pelvic renal transplant with hydronephrosis.    Probable constipation.    Question gastritis versus underdistention.

## 2024-02-10 NOTE — H&P ADULT - HISTORY OF PRESENT ILLNESS
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    Patient reports has had rectal pain for 4 days or so, has not had bowel movement until day of admission for those 4 days due to pain. Denies fevers, chills, sick contacts. no nausea, vomiting diarrhea. Patient is on insulin at home, says his DM came after his transplant in 12/2019, has not had issues administering his insulin. He also has RLE pain for about the same time, no shortness of breath or chest pain.    In ED patient was given 3L of IV fluids, was briefly started on insulin gtt for DKA. Received Zosyn.   In ED afebrile, hemodynamically table, breathing well on room air.

## 2024-02-10 NOTE — CONSULT NOTE ADULT - ATTENDING COMMENTS
Patient with previous ESRD secondary to FSGS, renal transplant 2020, now admitted with rectal abscess complicated by DKA.   1. Renal transplant recepient - monitor serial renal function levels.  2. Immunosuppression - medication regimen recommendations as noted above.  3. Metabolic acidosis - would manage with oral sodium bicarbonate for now.  Repeat levels 2/11  4. Hypophosphatemia - repeat 2/11.  May need oral supplementation or KPhos intravenous rider for supplementation.   Repeat serum magnesium levels with 2/11 labs.

## 2024-02-10 NOTE — ED PROVIDER NOTE - PROGRESS NOTE DETAILS
SARANYA Stacy: Pt received at signout, given DKA started on insulin drip, spoke with MICU will consult in ED. Given perianal abscess approaching anal verge on exam spoke with surgery will see pt in ED. Pt to be anticoagulated for DVT SARANYA Stacy: Awaiting recommendations from MICU, awaiting surgery consult, NS with K not available, pt ordered for maintenance fluids and K run SARANYA Stacy: I&D performed by surgery, pt accepted to MICU SARANYA Stacy: I&D performed by surgery awaiting callback to discuss starting heparin, pt accepted to MICU SARANYA Stacy: , insulin drip stopped, pt ordered for 3 runs K given K 3.1 SARANYA Stacy: , insulin drip stopped, pt ordered for 3 runs K given K 3.1 Spoke with MICU fellow, recommending 3 K runs, after which if K is 3.3 or greater can re-start insulin drip with D5 NS

## 2024-02-10 NOTE — ED PROVIDER NOTE - PHYSICAL EXAMINATION
Exam as stated below:   CONSTITUTIONAL: In NAD.   SKIN: Warm dry. No rashes noted.   EYES: No scleral icterus. Conjunctiva pink.  ENT: Posterior pharynx with no erythema.  CARD: RRR. No murmurs.   RESP: Clear to ausculation b/l. No Crackles noted. No Wheezing noted.  ABD: Soft. Non-tender. Not distended.   No lumps appreciated on TODD exam.  Patient at the 1:00 region with shiny fluctuant approximately 2 cm x 2 cm mass perianally.  No crepitus  MSK: No pedal edema. No calf tenderness.   patient with no fullness behind the right knee.  Just medial and distal to the knee is having nonpainful cordlike phenomena palpated, not found on other leg.  NEURO: UE/LE grossly intact. Motor UE/LE sensation grossly intact. CN II-XII grossly intact.   PSYCH: Cooperative, appropriate.

## 2024-02-11 LAB
ALBUMIN SERPL ELPH-MCNC: 2.8 G/DL — LOW (ref 3.3–5)
ALBUMIN SERPL ELPH-MCNC: 2.9 G/DL — LOW (ref 3.3–5)
ALBUMIN SERPL ELPH-MCNC: 2.9 G/DL — LOW (ref 3.3–5)
ALBUMIN SERPL ELPH-MCNC: 3 G/DL — LOW (ref 3.3–5)
ALBUMIN SERPL ELPH-MCNC: 3 G/DL — LOW (ref 3.3–5)
ALP SERPL-CCNC: 68 U/L — SIGNIFICANT CHANGE UP (ref 40–120)
ALP SERPL-CCNC: 71 U/L — SIGNIFICANT CHANGE UP (ref 40–120)
ALP SERPL-CCNC: 71 U/L — SIGNIFICANT CHANGE UP (ref 40–120)
ALP SERPL-CCNC: 73 U/L — SIGNIFICANT CHANGE UP (ref 40–120)
ALP SERPL-CCNC: 83 U/L — SIGNIFICANT CHANGE UP (ref 40–120)
ALT FLD-CCNC: 133 U/L — HIGH (ref 4–41)
ALT FLD-CCNC: 134 U/L — HIGH (ref 4–41)
ALT FLD-CCNC: 139 U/L — HIGH (ref 4–41)
ALT FLD-CCNC: 143 U/L — HIGH (ref 4–41)
ALT FLD-CCNC: 150 U/L — HIGH (ref 4–41)
ANION GAP SERPL CALC-SCNC: 10 MMOL/L — SIGNIFICANT CHANGE UP (ref 7–14)
ANION GAP SERPL CALC-SCNC: 10 MMOL/L — SIGNIFICANT CHANGE UP (ref 7–14)
ANION GAP SERPL CALC-SCNC: 11 MMOL/L — SIGNIFICANT CHANGE UP (ref 7–14)
ANION GAP SERPL CALC-SCNC: 8 MMOL/L — SIGNIFICANT CHANGE UP (ref 7–14)
ANION GAP SERPL CALC-SCNC: 8 MMOL/L — SIGNIFICANT CHANGE UP (ref 7–14)
APTT BLD: 32.6 SEC — SIGNIFICANT CHANGE UP (ref 24.5–35.6)
APTT BLD: 52.7 SEC — HIGH (ref 24.5–35.6)
AST SERPL-CCNC: 65 U/L — HIGH (ref 4–40)
AST SERPL-CCNC: 65 U/L — HIGH (ref 4–40)
AST SERPL-CCNC: 83 U/L — HIGH (ref 4–40)
AST SERPL-CCNC: 84 U/L — HIGH (ref 4–40)
AST SERPL-CCNC: 89 U/L — HIGH (ref 4–40)
BASE EXCESS BLDV CALC-SCNC: -2.7 MMOL/L — LOW (ref -2–3)
BASE EXCESS BLDV CALC-SCNC: -3.9 MMOL/L — LOW (ref -2–3)
BASE EXCESS BLDV CALC-SCNC: -5.4 MMOL/L — LOW (ref -2–3)
BASOPHILS # BLD AUTO: 0 K/UL — SIGNIFICANT CHANGE UP (ref 0–0.2)
BASOPHILS # BLD AUTO: 0.01 K/UL — SIGNIFICANT CHANGE UP (ref 0–0.2)
BASOPHILS NFR BLD AUTO: 0 % — SIGNIFICANT CHANGE UP (ref 0–2)
BASOPHILS NFR BLD AUTO: 0.2 % — SIGNIFICANT CHANGE UP (ref 0–2)
BILIRUB SERPL-MCNC: 0.4 MG/DL — SIGNIFICANT CHANGE UP (ref 0.2–1.2)
BILIRUB SERPL-MCNC: 0.6 MG/DL — SIGNIFICANT CHANGE UP (ref 0.2–1.2)
BLOOD GAS VENOUS COMPREHENSIVE RESULT: SIGNIFICANT CHANGE UP
BUN SERPL-MCNC: 10 MG/DL — SIGNIFICANT CHANGE UP (ref 7–23)
BUN SERPL-MCNC: 8 MG/DL — SIGNIFICANT CHANGE UP (ref 7–23)
BUN SERPL-MCNC: 9 MG/DL — SIGNIFICANT CHANGE UP (ref 7–23)
CALCIUM SERPL-MCNC: 10 MG/DL — SIGNIFICANT CHANGE UP (ref 8.4–10.5)
CALCIUM SERPL-MCNC: 9.4 MG/DL — SIGNIFICANT CHANGE UP (ref 8.4–10.5)
CALCIUM SERPL-MCNC: 9.4 MG/DL — SIGNIFICANT CHANGE UP (ref 8.4–10.5)
CALCIUM SERPL-MCNC: 9.5 MG/DL — SIGNIFICANT CHANGE UP (ref 8.4–10.5)
CALCIUM SERPL-MCNC: 9.5 MG/DL — SIGNIFICANT CHANGE UP (ref 8.4–10.5)
CHLORIDE BLDV-SCNC: 104 MMOL/L — SIGNIFICANT CHANGE UP (ref 96–108)
CHLORIDE BLDV-SCNC: 107 MMOL/L — SIGNIFICANT CHANGE UP (ref 96–108)
CHLORIDE BLDV-SCNC: 108 MMOL/L — SIGNIFICANT CHANGE UP (ref 96–108)
CHLORIDE SERPL-SCNC: 103 MMOL/L — SIGNIFICANT CHANGE UP (ref 98–107)
CHLORIDE SERPL-SCNC: 105 MMOL/L — SIGNIFICANT CHANGE UP (ref 98–107)
CHLORIDE SERPL-SCNC: 106 MMOL/L — SIGNIFICANT CHANGE UP (ref 98–107)
CHLORIDE SERPL-SCNC: 110 MMOL/L — HIGH (ref 98–107)
CHLORIDE SERPL-SCNC: 111 MMOL/L — HIGH (ref 98–107)
CO2 BLDV-SCNC: 21.2 MMOL/L — LOW (ref 22–26)
CO2 BLDV-SCNC: 22.8 MMOL/L — SIGNIFICANT CHANGE UP (ref 22–26)
CO2 BLDV-SCNC: 25.6 MMOL/L — SIGNIFICANT CHANGE UP (ref 22–26)
CO2 SERPL-SCNC: 18 MMOL/L — LOW (ref 22–31)
CO2 SERPL-SCNC: 18 MMOL/L — LOW (ref 22–31)
CO2 SERPL-SCNC: 19 MMOL/L — LOW (ref 22–31)
CO2 SERPL-SCNC: 21 MMOL/L — LOW (ref 22–31)
CO2 SERPL-SCNC: 21 MMOL/L — LOW (ref 22–31)
CREAT SERPL-MCNC: 0.58 MG/DL — SIGNIFICANT CHANGE UP (ref 0.5–1.3)
CREAT SERPL-MCNC: 0.65 MG/DL — SIGNIFICANT CHANGE UP (ref 0.5–1.3)
CREAT SERPL-MCNC: 0.66 MG/DL — SIGNIFICANT CHANGE UP (ref 0.5–1.3)
CREAT SERPL-MCNC: 0.66 MG/DL — SIGNIFICANT CHANGE UP (ref 0.5–1.3)
CREAT SERPL-MCNC: 0.7 MG/DL — SIGNIFICANT CHANGE UP (ref 0.5–1.3)
CULTURE RESULTS: NO GROWTH — SIGNIFICANT CHANGE UP
EGFR: 122 ML/MIN/1.73M2 — SIGNIFICANT CHANGE UP
EGFR: 124 ML/MIN/1.73M2 — SIGNIFICANT CHANGE UP
EGFR: 129 ML/MIN/1.73M2 — SIGNIFICANT CHANGE UP
EOSINOPHIL # BLD AUTO: 0.06 K/UL — SIGNIFICANT CHANGE UP (ref 0–0.5)
EOSINOPHIL # BLD AUTO: 0.07 K/UL — SIGNIFICANT CHANGE UP (ref 0–0.5)
EOSINOPHIL # BLD AUTO: 0.09 K/UL — SIGNIFICANT CHANGE UP (ref 0–0.5)
EOSINOPHIL # BLD AUTO: 0.1 K/UL — SIGNIFICANT CHANGE UP (ref 0–0.5)
EOSINOPHIL NFR BLD AUTO: 1 % — SIGNIFICANT CHANGE UP (ref 0–6)
EOSINOPHIL NFR BLD AUTO: 1.1 % — SIGNIFICANT CHANGE UP (ref 0–6)
EOSINOPHIL NFR BLD AUTO: 1.1 % — SIGNIFICANT CHANGE UP (ref 0–6)
EOSINOPHIL NFR BLD AUTO: 1.2 % — SIGNIFICANT CHANGE UP (ref 0–6)
GAS PNL BLDV: 132 MMOL/L — LOW (ref 136–145)
GAS PNL BLDV: 132 MMOL/L — LOW (ref 136–145)
GAS PNL BLDV: 134 MMOL/L — LOW (ref 136–145)
GLUCOSE BLDC GLUCOMTR-MCNC: 107 MG/DL — HIGH (ref 70–99)
GLUCOSE BLDC GLUCOMTR-MCNC: 139 MG/DL — HIGH (ref 70–99)
GLUCOSE BLDC GLUCOMTR-MCNC: 150 MG/DL — HIGH (ref 70–99)
GLUCOSE BLDC GLUCOMTR-MCNC: 153 MG/DL — HIGH (ref 70–99)
GLUCOSE BLDC GLUCOMTR-MCNC: 179 MG/DL — HIGH (ref 70–99)
GLUCOSE BLDC GLUCOMTR-MCNC: 188 MG/DL — HIGH (ref 70–99)
GLUCOSE BLDC GLUCOMTR-MCNC: 194 MG/DL — HIGH (ref 70–99)
GLUCOSE BLDC GLUCOMTR-MCNC: 200 MG/DL — HIGH (ref 70–99)
GLUCOSE BLDC GLUCOMTR-MCNC: 210 MG/DL — HIGH (ref 70–99)
GLUCOSE BLDC GLUCOMTR-MCNC: 236 MG/DL — HIGH (ref 70–99)
GLUCOSE BLDC GLUCOMTR-MCNC: 254 MG/DL — HIGH (ref 70–99)
GLUCOSE BLDC GLUCOMTR-MCNC: 98 MG/DL — SIGNIFICANT CHANGE UP (ref 70–99)
GLUCOSE BLDV-MCNC: 156 MG/DL — HIGH (ref 70–99)
GLUCOSE BLDV-MCNC: 188 MG/DL — HIGH (ref 70–99)
GLUCOSE BLDV-MCNC: 276 MG/DL — HIGH (ref 70–99)
GLUCOSE SERPL-MCNC: 158 MG/DL — HIGH (ref 70–99)
GLUCOSE SERPL-MCNC: 164 MG/DL — HIGH (ref 70–99)
GLUCOSE SERPL-MCNC: 208 MG/DL — HIGH (ref 70–99)
GLUCOSE SERPL-MCNC: 265 MG/DL — HIGH (ref 70–99)
GLUCOSE SERPL-MCNC: 273 MG/DL — HIGH (ref 70–99)
HCO3 BLDV-SCNC: 20 MMOL/L — LOW (ref 22–29)
HCO3 BLDV-SCNC: 22 MMOL/L — SIGNIFICANT CHANGE UP (ref 22–29)
HCO3 BLDV-SCNC: 24 MMOL/L — SIGNIFICANT CHANGE UP (ref 22–29)
HCT VFR BLD CALC: 36.2 % — LOW (ref 39–50)
HCT VFR BLD CALC: 37.7 % — LOW (ref 39–50)
HCT VFR BLD CALC: 38.1 % — LOW (ref 39–50)
HCT VFR BLD CALC: 40.2 % — SIGNIFICANT CHANGE UP (ref 39–50)
HCT VFR BLDA CALC: 40 % — SIGNIFICANT CHANGE UP (ref 39–51)
HCT VFR BLDA CALC: 41 % — SIGNIFICANT CHANGE UP (ref 39–51)
HCT VFR BLDA CALC: 41 % — SIGNIFICANT CHANGE UP (ref 39–51)
HCYS SERPL-MCNC: 3.2 UMOL/L — SIGNIFICANT CHANGE UP
HGB BLD CALC-MCNC: 13.3 G/DL — SIGNIFICANT CHANGE UP (ref 12.6–17.4)
HGB BLD CALC-MCNC: 13.5 G/DL — SIGNIFICANT CHANGE UP (ref 12.6–17.4)
HGB BLD CALC-MCNC: 13.6 G/DL — SIGNIFICANT CHANGE UP (ref 12.6–17.4)
HGB BLD-MCNC: 13.2 G/DL — SIGNIFICANT CHANGE UP (ref 13–17)
HGB BLD-MCNC: 13.3 G/DL — SIGNIFICANT CHANGE UP (ref 13–17)
HGB BLD-MCNC: 13.4 G/DL — SIGNIFICANT CHANGE UP (ref 13–17)
HGB BLD-MCNC: 14.3 G/DL — SIGNIFICANT CHANGE UP (ref 13–17)
IANC: 3.63 K/UL — SIGNIFICANT CHANGE UP (ref 1.8–7.4)
IANC: 5.04 K/UL — SIGNIFICANT CHANGE UP (ref 1.8–7.4)
IANC: 5.09 K/UL — SIGNIFICANT CHANGE UP (ref 1.8–7.4)
IANC: 5.39 K/UL — SIGNIFICANT CHANGE UP (ref 1.8–7.4)
IMM GRANULOCYTES NFR BLD AUTO: 0.5 % — SIGNIFICANT CHANGE UP (ref 0–0.9)
IMM GRANULOCYTES NFR BLD AUTO: 0.6 % — SIGNIFICANT CHANGE UP (ref 0–0.9)
LACTATE BLDV-MCNC: 0.9 MMOL/L — SIGNIFICANT CHANGE UP (ref 0.5–2)
LACTATE BLDV-MCNC: 1.2 MMOL/L — SIGNIFICANT CHANGE UP (ref 0.5–2)
LACTATE BLDV-MCNC: 1.2 MMOL/L — SIGNIFICANT CHANGE UP (ref 0.5–2)
LYMPHOCYTES # BLD AUTO: 0.8 K/UL — LOW (ref 1–3.3)
LYMPHOCYTES # BLD AUTO: 0.96 K/UL — LOW (ref 1–3.3)
LYMPHOCYTES # BLD AUTO: 1.13 K/UL — SIGNIFICANT CHANGE UP (ref 1–3.3)
LYMPHOCYTES # BLD AUTO: 1.44 K/UL — SIGNIFICANT CHANGE UP (ref 1–3.3)
LYMPHOCYTES # BLD AUTO: 11.4 % — LOW (ref 13–44)
LYMPHOCYTES # BLD AUTO: 14.1 % — SIGNIFICANT CHANGE UP (ref 13–44)
LYMPHOCYTES # BLD AUTO: 16.8 % — SIGNIFICANT CHANGE UP (ref 13–44)
LYMPHOCYTES # BLD AUTO: 17.7 % — SIGNIFICANT CHANGE UP (ref 13–44)
MAGNESIUM SERPL-MCNC: 1.6 MG/DL — SIGNIFICANT CHANGE UP (ref 1.6–2.6)
MAGNESIUM SERPL-MCNC: 1.7 MG/DL — SIGNIFICANT CHANGE UP (ref 1.6–2.6)
MAGNESIUM SERPL-MCNC: 1.7 MG/DL — SIGNIFICANT CHANGE UP (ref 1.6–2.6)
MAGNESIUM SERPL-MCNC: 1.9 MG/DL — SIGNIFICANT CHANGE UP (ref 1.6–2.6)
MAGNESIUM SERPL-MCNC: 2.2 MG/DL — SIGNIFICANT CHANGE UP (ref 1.6–2.6)
MCHC RBC-ENTMCNC: 32.4 PG — SIGNIFICANT CHANGE UP (ref 27–34)
MCHC RBC-ENTMCNC: 32.7 PG — SIGNIFICANT CHANGE UP (ref 27–34)
MCHC RBC-ENTMCNC: 32.8 PG — SIGNIFICANT CHANGE UP (ref 27–34)
MCHC RBC-ENTMCNC: 32.9 PG — SIGNIFICANT CHANGE UP (ref 27–34)
MCHC RBC-ENTMCNC: 35.2 GM/DL — SIGNIFICANT CHANGE UP (ref 32–36)
MCHC RBC-ENTMCNC: 35.3 GM/DL — SIGNIFICANT CHANGE UP (ref 32–36)
MCHC RBC-ENTMCNC: 35.6 GM/DL — SIGNIFICANT CHANGE UP (ref 32–36)
MCHC RBC-ENTMCNC: 36.5 GM/DL — HIGH (ref 32–36)
MCV RBC AUTO: 90 FL — SIGNIFICANT CHANGE UP (ref 80–100)
MCV RBC AUTO: 91.7 FL — SIGNIFICANT CHANGE UP (ref 80–100)
MCV RBC AUTO: 92 FL — SIGNIFICANT CHANGE UP (ref 80–100)
MCV RBC AUTO: 93.6 FL — SIGNIFICANT CHANGE UP (ref 80–100)
MONOCYTES # BLD AUTO: 1.02 K/UL — HIGH (ref 0–0.9)
MONOCYTES # BLD AUTO: 1.06 K/UL — HIGH (ref 0–0.9)
MONOCYTES # BLD AUTO: 1.36 K/UL — HIGH (ref 0–0.9)
MONOCYTES # BLD AUTO: 1.47 K/UL — HIGH (ref 0–0.9)
MONOCYTES NFR BLD AUTO: 15.1 % — HIGH (ref 2–14)
MONOCYTES NFR BLD AUTO: 17 % — HIGH (ref 2–14)
MONOCYTES NFR BLD AUTO: 17.9 % — HIGH (ref 2–14)
MONOCYTES NFR BLD AUTO: 18.1 % — HIGH (ref 2–14)
MRSA PCR RESULT.: SIGNIFICANT CHANGE UP
NEUTROPHILS # BLD AUTO: 3.63 K/UL — SIGNIFICANT CHANGE UP (ref 1.8–7.4)
NEUTROPHILS # BLD AUTO: 5.04 K/UL — SIGNIFICANT CHANGE UP (ref 1.8–7.4)
NEUTROPHILS # BLD AUTO: 5.09 K/UL — SIGNIFICANT CHANGE UP (ref 1.8–7.4)
NEUTROPHILS # BLD AUTO: 5.39 K/UL — SIGNIFICANT CHANGE UP (ref 1.8–7.4)
NEUTROPHILS NFR BLD AUTO: 62.5 % — SIGNIFICANT CHANGE UP (ref 43–77)
NEUTROPHILS NFR BLD AUTO: 63.5 % — SIGNIFICANT CHANGE UP (ref 43–77)
NEUTROPHILS NFR BLD AUTO: 67.3 % — SIGNIFICANT CHANGE UP (ref 43–77)
NEUTROPHILS NFR BLD AUTO: 71.9 % — SIGNIFICANT CHANGE UP (ref 43–77)
NRBC # BLD: 0 /100 WBCS — SIGNIFICANT CHANGE UP (ref 0–0)
NRBC # FLD: 0 K/UL — SIGNIFICANT CHANGE UP (ref 0–0)
PCO2 BLDV: 38 MMHG — LOW (ref 42–55)
PCO2 BLDV: 40 MMHG — LOW (ref 42–55)
PCO2 BLDV: 49 MMHG — SIGNIFICANT CHANGE UP (ref 42–55)
PH BLDV: 7.3 — LOW (ref 7.32–7.43)
PH BLDV: 7.33 — SIGNIFICANT CHANGE UP (ref 7.32–7.43)
PH BLDV: 7.34 — SIGNIFICANT CHANGE UP (ref 7.32–7.43)
PHOSPHATE SERPL-MCNC: 1.8 MG/DL — LOW (ref 2.5–4.5)
PHOSPHATE SERPL-MCNC: 2 MG/DL — LOW (ref 2.5–4.5)
PHOSPHATE SERPL-MCNC: 2.1 MG/DL — LOW (ref 2.5–4.5)
PHOSPHATE SERPL-MCNC: 2.3 MG/DL — LOW (ref 2.5–4.5)
PHOSPHATE SERPL-MCNC: 5.9 MG/DL — HIGH (ref 2.5–4.5)
PLATELET # BLD AUTO: 144 K/UL — LOW (ref 150–400)
PLATELET # BLD AUTO: 146 K/UL — LOW (ref 150–400)
PLATELET # BLD AUTO: 158 K/UL — SIGNIFICANT CHANGE UP (ref 150–400)
PLATELET # BLD AUTO: 162 K/UL — SIGNIFICANT CHANGE UP (ref 150–400)
PO2 BLDV: 25 MMHG — SIGNIFICANT CHANGE UP (ref 25–45)
PO2 BLDV: 47 MMHG — HIGH (ref 25–45)
PO2 BLDV: 60 MMHG — HIGH (ref 25–45)
POTASSIUM BLDV-SCNC: 4.2 MMOL/L — SIGNIFICANT CHANGE UP (ref 3.5–5.1)
POTASSIUM BLDV-SCNC: 4.2 MMOL/L — SIGNIFICANT CHANGE UP (ref 3.5–5.1)
POTASSIUM BLDV-SCNC: 4.8 MMOL/L — SIGNIFICANT CHANGE UP (ref 3.5–5.1)
POTASSIUM SERPL-MCNC: 4.3 MMOL/L — SIGNIFICANT CHANGE UP (ref 3.5–5.3)
POTASSIUM SERPL-MCNC: 4.4 MMOL/L — SIGNIFICANT CHANGE UP (ref 3.5–5.3)
POTASSIUM SERPL-MCNC: 4.5 MMOL/L — SIGNIFICANT CHANGE UP (ref 3.5–5.3)
POTASSIUM SERPL-MCNC: 4.9 MMOL/L — SIGNIFICANT CHANGE UP (ref 3.5–5.3)
POTASSIUM SERPL-MCNC: 5.6 MMOL/L — HIGH (ref 3.5–5.3)
POTASSIUM SERPL-SCNC: 4.3 MMOL/L — SIGNIFICANT CHANGE UP (ref 3.5–5.3)
POTASSIUM SERPL-SCNC: 4.4 MMOL/L — SIGNIFICANT CHANGE UP (ref 3.5–5.3)
POTASSIUM SERPL-SCNC: 4.5 MMOL/L — SIGNIFICANT CHANGE UP (ref 3.5–5.3)
POTASSIUM SERPL-SCNC: 4.9 MMOL/L — SIGNIFICANT CHANGE UP (ref 3.5–5.3)
POTASSIUM SERPL-SCNC: 5.6 MMOL/L — HIGH (ref 3.5–5.3)
PROT SERPL-MCNC: 5.5 G/DL — LOW (ref 6–8.3)
PROT SERPL-MCNC: 5.5 G/DL — LOW (ref 6–8.3)
PROT SERPL-MCNC: 5.6 G/DL — LOW (ref 6–8.3)
PROT SERPL-MCNC: 5.9 G/DL — LOW (ref 6–8.3)
PROT SERPL-MCNC: 6.1 G/DL — SIGNIFICANT CHANGE UP (ref 6–8.3)
RBC # BLD: 4.02 M/UL — LOW (ref 4.2–5.8)
RBC # BLD: 4.07 M/UL — LOW (ref 4.2–5.8)
RBC # BLD: 4.11 M/UL — LOW (ref 4.2–5.8)
RBC # BLD: 4.37 M/UL — SIGNIFICANT CHANGE UP (ref 4.2–5.8)
RBC # FLD: 12.2 % — SIGNIFICANT CHANGE UP (ref 10.3–14.5)
RBC # FLD: 12.6 % — SIGNIFICANT CHANGE UP (ref 10.3–14.5)
RBC # FLD: 12.7 % — SIGNIFICANT CHANGE UP (ref 10.3–14.5)
RBC # FLD: 12.7 % — SIGNIFICANT CHANGE UP (ref 10.3–14.5)
S AUREUS DNA NOSE QL NAA+PROBE: DETECTED
SAO2 % BLDV: 46 % — LOW (ref 67–88)
SAO2 % BLDV: 87.6 % — SIGNIFICANT CHANGE UP (ref 67–88)
SAO2 % BLDV: 93.3 % — HIGH (ref 67–88)
SODIUM SERPL-SCNC: 134 MMOL/L — LOW (ref 135–145)
SODIUM SERPL-SCNC: 134 MMOL/L — LOW (ref 135–145)
SODIUM SERPL-SCNC: 137 MMOL/L — SIGNIFICANT CHANGE UP (ref 135–145)
SPECIMEN SOURCE: SIGNIFICANT CHANGE UP
TACROLIMUS SERPL-MCNC: 4.9 NG/ML — SIGNIFICANT CHANGE UP
TACROLIMUS SERPL-MCNC: 7.5 NG/ML — SIGNIFICANT CHANGE UP
WBC # BLD: 5.71 K/UL — SIGNIFICANT CHANGE UP (ref 3.8–10.5)
WBC # BLD: 7.01 K/UL — SIGNIFICANT CHANGE UP (ref 3.8–10.5)
WBC # BLD: 8.01 K/UL — SIGNIFICANT CHANGE UP (ref 3.8–10.5)
WBC # BLD: 8.14 K/UL — SIGNIFICANT CHANGE UP (ref 3.8–10.5)
WBC # FLD AUTO: 5.71 K/UL — SIGNIFICANT CHANGE UP (ref 3.8–10.5)
WBC # FLD AUTO: 7.01 K/UL — SIGNIFICANT CHANGE UP (ref 3.8–10.5)
WBC # FLD AUTO: 8.01 K/UL — SIGNIFICANT CHANGE UP (ref 3.8–10.5)
WBC # FLD AUTO: 8.14 K/UL — SIGNIFICANT CHANGE UP (ref 3.8–10.5)

## 2024-02-11 PROCEDURE — 99233 SBSQ HOSP IP/OBS HIGH 50: CPT | Mod: GC

## 2024-02-11 PROCEDURE — 99232 SBSQ HOSP IP/OBS MODERATE 35: CPT

## 2024-02-11 PROCEDURE — ZZZZZ: CPT

## 2024-02-11 RX ORDER — SODIUM CHLORIDE 9 MG/ML
1000 INJECTION, SOLUTION INTRAVENOUS
Refills: 0 | Status: DISCONTINUED | OUTPATIENT
Start: 2024-02-11 | End: 2024-02-11

## 2024-02-11 RX ORDER — DEXTROSE 50 % IN WATER 50 %
25 SYRINGE (ML) INTRAVENOUS ONCE
Refills: 0 | Status: DISCONTINUED | OUTPATIENT
Start: 2024-02-11 | End: 2024-02-14

## 2024-02-11 RX ORDER — ENOXAPARIN SODIUM 100 MG/ML
60 INJECTION SUBCUTANEOUS EVERY 12 HOURS
Refills: 0 | Status: DISCONTINUED | OUTPATIENT
Start: 2024-02-11 | End: 2024-02-14

## 2024-02-11 RX ORDER — INSULIN LISPRO 100/ML
VIAL (ML) SUBCUTANEOUS
Refills: 0 | Status: DISCONTINUED | OUTPATIENT
Start: 2024-02-11 | End: 2024-02-14

## 2024-02-11 RX ORDER — GLUCAGON INJECTION, SOLUTION 0.5 MG/.1ML
1 INJECTION, SOLUTION SUBCUTANEOUS ONCE
Refills: 0 | Status: DISCONTINUED | OUTPATIENT
Start: 2024-02-11 | End: 2024-02-14

## 2024-02-11 RX ORDER — SODIUM CHLORIDE 9 MG/ML
1000 INJECTION, SOLUTION INTRAVENOUS
Refills: 0 | Status: DISCONTINUED | OUTPATIENT
Start: 2024-02-11 | End: 2024-02-14

## 2024-02-11 RX ORDER — INSULIN LISPRO 100/ML
8 VIAL (ML) SUBCUTANEOUS
Refills: 0 | Status: DISCONTINUED | OUTPATIENT
Start: 2024-02-11 | End: 2024-02-12

## 2024-02-11 RX ORDER — FUROSEMIDE 40 MG
10 TABLET ORAL ONCE
Refills: 0 | Status: COMPLETED | OUTPATIENT
Start: 2024-02-11 | End: 2024-02-11

## 2024-02-11 RX ORDER — POTASSIUM PHOSPHATE, MONOBASIC POTASSIUM PHOSPHATE, DIBASIC 236; 224 MG/ML; MG/ML
15 INJECTION, SOLUTION INTRAVENOUS ONCE
Refills: 0 | Status: COMPLETED | OUTPATIENT
Start: 2024-02-11 | End: 2024-02-11

## 2024-02-11 RX ORDER — DEXTROSE 50 % IN WATER 50 %
15 SYRINGE (ML) INTRAVENOUS ONCE
Refills: 0 | Status: DISCONTINUED | OUTPATIENT
Start: 2024-02-11 | End: 2024-02-14

## 2024-02-11 RX ORDER — INSULIN GLARGINE 100 [IU]/ML
20 INJECTION, SOLUTION SUBCUTANEOUS ONCE
Refills: 0 | Status: DISCONTINUED | OUTPATIENT
Start: 2024-02-11 | End: 2024-02-11

## 2024-02-11 RX ORDER — VANCOMYCIN HCL 1 G
1000 VIAL (EA) INTRAVENOUS EVERY 12 HOURS
Refills: 0 | Status: DISCONTINUED | OUTPATIENT
Start: 2024-02-11 | End: 2024-02-12

## 2024-02-11 RX ORDER — INSULIN GLARGINE 100 [IU]/ML
18 INJECTION, SOLUTION SUBCUTANEOUS ONCE
Refills: 0 | Status: COMPLETED | OUTPATIENT
Start: 2024-02-11 | End: 2024-02-11

## 2024-02-11 RX ORDER — DEXTROSE 50 % IN WATER 50 %
25 SYRINGE (ML) INTRAVENOUS ONCE
Refills: 0 | Status: COMPLETED | OUTPATIENT
Start: 2024-02-11 | End: 2024-02-11

## 2024-02-11 RX ORDER — DEXTROSE 50 % IN WATER 50 %
12.5 SYRINGE (ML) INTRAVENOUS ONCE
Refills: 0 | Status: DISCONTINUED | OUTPATIENT
Start: 2024-02-11 | End: 2024-02-14

## 2024-02-11 RX ORDER — INSULIN GLARGINE 100 [IU]/ML
18 INJECTION, SOLUTION SUBCUTANEOUS EVERY MORNING
Refills: 0 | Status: DISCONTINUED | OUTPATIENT
Start: 2024-02-12 | End: 2024-02-12

## 2024-02-11 RX ORDER — INSULIN LISPRO 100/ML
VIAL (ML) SUBCUTANEOUS AT BEDTIME
Refills: 0 | Status: DISCONTINUED | OUTPATIENT
Start: 2024-02-11 | End: 2024-02-14

## 2024-02-11 RX ORDER — MAGNESIUM SULFATE 500 MG/ML
2 VIAL (ML) INJECTION ONCE
Refills: 0 | Status: COMPLETED | OUTPATIENT
Start: 2024-02-11 | End: 2024-02-11

## 2024-02-11 RX ORDER — SODIUM CHLORIDE 9 MG/ML
250 INJECTION INTRAMUSCULAR; INTRAVENOUS; SUBCUTANEOUS ONCE
Refills: 0 | Status: COMPLETED | OUTPATIENT
Start: 2024-02-11 | End: 2024-02-11

## 2024-02-11 RX ADMIN — Medication 10 MILLIGRAM(S): at 23:15

## 2024-02-11 RX ADMIN — SODIUM CHLORIDE 500 MILLILITER(S): 9 INJECTION INTRAMUSCULAR; INTRAVENOUS; SUBCUTANEOUS at 23:14

## 2024-02-11 RX ADMIN — TACROLIMUS 2 MILLIGRAM(S): 5 CAPSULE ORAL at 06:05

## 2024-02-11 RX ADMIN — Medication 8 UNIT(S): at 11:38

## 2024-02-11 RX ADMIN — Medication 1: at 16:50

## 2024-02-11 RX ADMIN — INSULIN GLARGINE 18 UNIT(S): 100 INJECTION, SOLUTION SUBCUTANEOUS at 06:05

## 2024-02-11 RX ADMIN — Medication 8 UNIT(S): at 16:49

## 2024-02-11 RX ADMIN — Medication 250 MILLIGRAM(S): at 06:06

## 2024-02-11 RX ADMIN — POTASSIUM PHOSPHATE, MONOBASIC POTASSIUM PHOSPHATE, DIBASIC 62.5 MILLIMOLE(S): 236; 224 INJECTION, SOLUTION INTRAVENOUS at 06:05

## 2024-02-11 RX ADMIN — TACROLIMUS 2 MILLIGRAM(S): 5 CAPSULE ORAL at 17:14

## 2024-02-11 RX ADMIN — Medication 1 PACKET(S): at 06:06

## 2024-02-11 RX ADMIN — Medication 25 GRAM(S): at 02:10

## 2024-02-11 RX ADMIN — ENOXAPARIN SODIUM 60 MILLIGRAM(S): 100 INJECTION SUBCUTANEOUS at 20:50

## 2024-02-11 RX ADMIN — PIPERACILLIN AND TAZOBACTAM 25 GRAM(S): 4; .5 INJECTION, POWDER, LYOPHILIZED, FOR SOLUTION INTRAVENOUS at 17:14

## 2024-02-11 RX ADMIN — Medication 3: at 11:38

## 2024-02-11 RX ADMIN — Medication 250 MILLIGRAM(S): at 17:14

## 2024-02-11 RX ADMIN — POLYETHYLENE GLYCOL 3350 17 GRAM(S): 17 POWDER, FOR SOLUTION ORAL at 12:35

## 2024-02-11 RX ADMIN — HEPARIN SODIUM 1100 UNIT(S)/HR: 5000 INJECTION INTRAVENOUS; SUBCUTANEOUS at 05:16

## 2024-02-11 RX ADMIN — Medication 5 MILLIGRAM(S): at 06:05

## 2024-02-11 RX ADMIN — Medication 25 MILLILITER(S): at 04:18

## 2024-02-11 RX ADMIN — POTASSIUM PHOSPHATE, MONOBASIC POTASSIUM PHOSPHATE, DIBASIC 62.5 MILLIMOLE(S): 236; 224 INJECTION, SOLUTION INTRAVENOUS at 12:34

## 2024-02-11 RX ADMIN — Medication 2: at 07:51

## 2024-02-11 RX ADMIN — Medication 8 UNIT(S): at 07:51

## 2024-02-11 RX ADMIN — HEPARIN SODIUM 1200 UNIT(S)/HR: 5000 INJECTION INTRAVENOUS; SUBCUTANEOUS at 12:00

## 2024-02-11 RX ADMIN — PIPERACILLIN AND TAZOBACTAM 25 GRAM(S): 4; .5 INJECTION, POWDER, LYOPHILIZED, FOR SOLUTION INTRAVENOUS at 10:40

## 2024-02-11 RX ADMIN — PIPERACILLIN AND TAZOBACTAM 25 GRAM(S): 4; .5 INJECTION, POWDER, LYOPHILIZED, FOR SOLUTION INTRAVENOUS at 02:10

## 2024-02-11 RX ADMIN — Medication 1 PACKET(S): at 02:10

## 2024-02-11 NOTE — PROGRESS NOTE ADULT - PROBLEM SELECTOR PLAN 2
Currently on immunosuppression (oral tacrolimus 2mg BID,  mg BID and prednisone 5 mg PO OD) for kidney transplant. Recommend continuing home Tacrolimus 2mg BID and home Prednisone 5mg daily. Given perirectal abscess, would hold MMF for now. Check serum tacrolimus level (trough) 30 mins prior to am dose. Currently on immunosuppression (oral tacrolimus 2mg BID,  mg BID and prednisone 5 mg PO OD) for kidney transplant. Recommend continuing home Tacrolimus 2mg q12 and home Prednisone 5mg daily. Given perirectal abscess, would hold MMF for now. Check serum tacrolimus level (trough) 30 mins prior to am dose.

## 2024-02-11 NOTE — PROGRESS NOTE ADULT - ASSESSMENT
38 year old male with PMH kidney transplant, DMT2 presented on 2/10 for 4 days of perirectal pain.    Afebrile   Leukocytosis 11K  Hyperglycemia increase BHB, acidosis     Workup:   -CT AP: Perianal abscess measuring 2.1 x 1.5 x 2.6 cm, seen at the anal verge7 mm cystic lesion of the pancreatic head. One year follow-up advised, preferably with MRI.  Right pelvic renal transplant with hydronephrosis.  Probable constipation.  Question gastritis versus underdistention.  -US doppler LE: R DVT    -Colorectal surgery - s/p I&D today     Antimicrobials:   Zosyn X1     #Perirectal abscess s/p bedside I&D  #Leukocytosis   #DKA   #RLE DVT  #Kidney transplant    Recommendations:  -Continue Zosyn 3.375 g IV q 8hrs   -Continue Vancomycin 1 g IV q12hrs   -I&D Cx + GPC in pairs, F/up speciation/sensitivity   -F/up blood Cx   -DKA management per primary team   -Monitor WBC curve       Camila Borjas MD, PGY5  ID fellow  [facility] Teams Preferred  After 5pm/weekends call [phone]

## 2024-02-11 NOTE — CHART NOTE - NSCHARTNOTEFT_GEN_A_CORE
Endocrinology following for DKA management. DKA resolved.    Recommend  - f/u zinc transporter8, gad65, IA2 antibody  - continue lantus 18 units daily at 8am (transitioned this morning)  - recommend 6 units admelog tid premeal   - low admelog correction scale tid premeal  - low admelog correction scale at bedtime  - target bg 100-180  - hypoglycemia protocol prn  - carb consistent diet  - RD consult    Endocrinology will continue to follow.    Juan R Carmona MD  Endocrinology Fellow

## 2024-02-11 NOTE — PROGRESS NOTE ADULT - SUBJECTIVE AND OBJECTIVE BOX
MICU Progress Note    Interval Events:        REVIEW OF SYSTEMS:  Constitutional: [ ] negative [ ] fevers [ ] chills [ ] weight loss [ ] weight gain  HEENT: [ ] negative [ ] dry eyes [ ] eye irritation [ ] postnasal drip [ ] nasal congestion  CV: [ ] negative  [ ] chest pain [ ] orthopnea [ ] palpitations [ ] murmur  Resp: [ ] negative [ ] cough [ ] shortness of breath [ ] dyspnea [ ] wheezing [ ] sputum [ ] hemoptysis  GI: [ ] negative [ ] nausea [ ] vomiting [ ] diarrhea [ ] constipation [ ] abd pain [ ] dysphagia   : [ ] negative [ ] dysuria [ ] nocturia [ ] hematuria [ ] increased urinary frequency  Musculoskeletal: [ ] negative [ ] back pain [ ] myalgias [ ] arthralgias [ ] fracture  Skin: [ ] negative [ ] rash [ ] itch  Neurological: [ ] negative [ ] headache [ ] dizziness [ ] syncope [ ] weakness [ ] numbness  Psychiatric: [ ] negative [ ] anxiety [ ] depression  Endocrine: [ ] negative [ ] diabetes [ ] thyroid problem  Hematologic/Lymphatic: [ ] negative [ ] anemia [ ] bleeding problem  Allergic/Immunologic: [ ] negative [ ] itchy eyes [ ] nasal discharge [ ] hives [ ] angioedema  [ ] All other systems negative  [ ] Unable to assess ROS because ________    OBJECTIVE:  ICU Vital Signs Last 24 Hrs  T(C): 36.7 (11 Feb 2024 08:00), Max: 37.2 (10 Feb 2024 16:00)  T(F): 98.1 (11 Feb 2024 08:00), Max: 99 (10 Feb 2024 16:00)  HR: 80 (11 Feb 2024 10:00) (70 - 95)  BP: 112/89 (11 Feb 2024 10:00) (101/72 - 136/89)  BP(mean): 98 (11 Feb 2024 10:00) (81 - 109)  ABP: --  ABP(mean): --  RR: 18 (11 Feb 2024 10:00) (12 - 26)  SpO2: 97% (11 Feb 2024 10:00) (97% - 100%)    O2 Parameters below as of 11 Feb 2024 10:00  Patient On (Oxygen Delivery Method): room air              02-10 @ 07:01  -  02-11 @ 07:00  --------------------------------------------------------  IN: 2798.2 mL / OUT: 2200 mL / NET: 598.2 mL    02-11 @ 07:01 - 02-11 @ 10:55  --------------------------------------------------------  IN: 134.3 mL / OUT: 400 mL / NET: -265.7 mL      CAPILLARY BLOOD GLUCOSE      POCT Blood Glucose.: 210 mg/dL (11 Feb 2024 07:07)      PHYSICAL EXAM:  GENERAL: NAD, well-groomed, well-developed  HEAD:  Atraumatic, Normocephalic  EYES: EOMI, PERRLA, conjunctiva and sclera clear  ENMT: No tonsillar erythema, exudates, or enlargement; Moist mucous membranes, Good dentition, No lesions  NECK: Supple, No JVD, Normal thyroid  CHEST/LUNG: Clear to auscultation bilaterally; No rales, rhonchi, wheezing, or rubs  HEART: Regular rate and rhythm; No murmurs, rubs, or gallops  ABDOMEN: Soft, Nontender, Nondistended; Bowel sounds present  VASCULAR:  2+ Peripheral Pulses, No clubbing, cyanosis, or edema  LYMPH: No lymphadenopathy noted  SKIN: No rashes or lesions  NERVOUS SYSTEM:  Alert & Oriented X3, Good concentration; Motor Strength 5/5 B/L upper and lower extremities; DTRs 2+ intact and symmetric    LINES:    HOSPITAL MEDICATIONS:  heparin  Infusion. 500 Unit(s)/Hr IV Continuous <Continuous>    piperacillin/tazobactam IVPB.. 3.375 Gram(s) IV Intermittent every 8 hours  vancomycin  IVPB 1000 milliGRAM(s) IV Intermittent every 12 hours      dextrose 50% Injectable 25 Gram(s) IV Push once  dextrose 50% Injectable 12.5 Gram(s) IV Push once  dextrose 50% Injectable 25 Gram(s) IV Push once  dextrose 50% Injectable 25 Gram(s) IV Push once  dextrose Oral Gel 15 Gram(s) Oral once PRN  glucagon  Injectable 1 milliGRAM(s) IntraMuscular once  insulin glargine Injectable (LANTUS) 18 Unit(s) SubCutaneous every morning  insulin lispro (ADMELOG) corrective regimen sliding scale   SubCutaneous at bedtime  insulin lispro (ADMELOG) corrective regimen sliding scale   SubCutaneous three times a day before meals  insulin lispro Injectable (ADMELOG) 8 Unit(s) SubCutaneous three times a day before meals  insulin regular Infusion 2 Unit(s)/Hr IV Continuous <Continuous>  predniSONE   Tablet 5 milliGRAM(s) Oral daily        polyethylene glycol 3350 17 Gram(s) Oral daily  senna 2 Tablet(s) Oral at bedtime        dextrose 5% + lactated ringers. 1000 milliLiter(s) IV Continuous <Continuous>  dextrose 5%. 1000 milliLiter(s) IV Continuous <Continuous>  dextrose 5%. 1000 milliLiter(s) IV Continuous <Continuous>    tacrolimus 2 milliGRAM(s) Oral two times a day          LABS:                        13.2   8.01  )-----------( 146      ( 11 Feb 2024 01:00 )             36.2     Hgb Trend: 13.2<--, 13.0<--, 14.9<--  02-11    137  |  110<H>  |  8   ----------------------------<  164<H>  4.3   |  19<L>  |  0.66    Ca    9.4      11 Feb 2024 04:39  Phos  2.0     02-11  Mg     2.20     02-11    TPro  5.5<L>  /  Alb  2.9<L>  /  TBili  0.6  /  DBili  x   /  AST  65<H>  /  ALT  134<H>  /  AlkPhos  68  02-11    Creatinine Trend: 0.66<--, 0.66<--, 0.73<--, 0.68<--, 0.74<--, 1.09<--  PT/INR - ( 10 Feb 2024 21:30 )   PT: 11.5 sec;   INR: 1.03 ratio         PTT - ( 11 Feb 2024 04:39 )  PTT:32.6 sec  Urinalysis Basic - ( 11 Feb 2024 04:39 )    Color: x / Appearance: x / SG: x / pH: x  Gluc: 164 mg/dL / Ketone: x  / Bili: x / Urobili: x   Blood: x / Protein: x / Nitrite: x   Leuk Esterase: x / RBC: x / WBC x   Sq Epi: x / Non Sq Epi: x / Bacteria: x        Venous Blood Gas:  02-11 @ 04:39  7.30/49/25/24/46.0  VBG Lactate: 1.2  Venous Blood Gas:  02-11 @ 01:00  7.33/38/60/20/93.3  VBG Lactate: 0.9  Venous Blood Gas:  02-10 @ 21:30  7.33/42/25/22/50.3  VBG Lactate: 1.3  Venous Blood Gas:  02-10 @ 15:45  7.16/39/48/14/80.0  VBG Lactate: 1.8  Venous Blood Gas:  02-10 @ 10:30  7.30/33/83/16/96.8  VBG Lactate: 1.3  Venous Blood Gas:  02-10 @ 05:45  7.23/27/64/11/91.4  VBG Lactate: 0.9      MICROBIOLOGY:     RADIOLOGY:  [ ] Reviewed and interpreted by me    EKG: MICU Progress Note    Interval Events:    Doing well. Transitioned off insulin drip.     REVIEW OF SYSTEMS:  Constitutional: [ ] negative [ ] fevers [ ] chills [ ] weight loss [ ] weight gain  HEENT: [ ] negative [ ] dry eyes [ ] eye irritation [ ] postnasal drip [ ] nasal congestion  CV: [ ] negative  [ ] chest pain [ ] orthopnea [ ] palpitations [ ] murmur  Resp: [ ] negative [ ] cough [ ] shortness of breath [ ] dyspnea [ ] wheezing [ ] sputum [ ] hemoptysis  GI: [ ] negative [ ] nausea [ ] vomiting [ ] diarrhea [ ] constipation [ ] abd pain [ ] dysphagia   : [ ] negative [ ] dysuria [ ] nocturia [ ] hematuria [ ] increased urinary frequency  Musculoskeletal: [ ] negative [ ] back pain [ ] myalgias [ ] arthralgias [ ] fracture  Skin: [ ] negative [ ] rash [ ] itch  Neurological: [ ] negative [ ] headache [ ] dizziness [ ] syncope [ ] weakness [ ] numbness  Psychiatric: [ ] negative [ ] anxiety [ ] depression  Endocrine: [ ] negative [ ] diabetes [ ] thyroid problem  Hematologic/Lymphatic: [ ] negative [ ] anemia [ ] bleeding problem  Allergic/Immunologic: [ ] negative [ ] itchy eyes [ ] nasal discharge [ ] hives [ ] angioedema  [x] All other systems negative  [ ] Unable to assess ROS because ________    OBJECTIVE:  ICU Vital Signs Last 24 Hrs  T(C): 36.7 (11 Feb 2024 08:00), Max: 37.2 (10 Feb 2024 16:00)  T(F): 98.1 (11 Feb 2024 08:00), Max: 99 (10 Feb 2024 16:00)  HR: 80 (11 Feb 2024 10:00) (70 - 95)  BP: 112/89 (11 Feb 2024 10:00) (101/72 - 136/89)  BP(mean): 98 (11 Feb 2024 10:00) (81 - 109)  ABP: --  ABP(mean): --  RR: 18 (11 Feb 2024 10:00) (12 - 26)  SpO2: 97% (11 Feb 2024 10:00) (97% - 100%)    O2 Parameters below as of 11 Feb 2024 10:00  Patient On (Oxygen Delivery Method): room air              02-10 @ 07:01 - 02-11 @ 07:00  --------------------------------------------------------  IN: 2798.2 mL / OUT: 2200 mL / NET: 598.2 mL    02-11 @ 07:01 - 02-11 @ 10:55  --------------------------------------------------------  IN: 134.3 mL / OUT: 400 mL / NET: -265.7 mL      CAPILLARY BLOOD GLUCOSE      POCT Blood Glucose.: 210 mg/dL (11 Feb 2024 07:07)      PHYSICAL EXAM:  GENERAL: NAD, well-groomed, well-developed  HEAD:  Atraumatic, Normocephalic  EYES: EOMI, PERRLA, conjunctiva and sclera clear  ENMT:  Moist mucous membranes  NECK: No JVD  CHEST/LUNG: Clear to auscultation bilaterally; No rales, rhonchi, wheezing, or rubs  HEART: Regular rate and rhythm; No murmurs, rubs, or gallops  ABDOMEN: Soft, Nontender, Nondistended; Bowel sounds present  VASCULAR:  No clubbing, cyanosis, or edema  LYMPH: No lymphadenopathy noted  SKIN: No rashes or lesions  NERVOUS SYSTEM:  Alert & Oriented X3     LINES:    HOSPITAL MEDICATIONS:  heparin  Infusion. 500 Unit(s)/Hr IV Continuous <Continuous>    piperacillin/tazobactam IVPB.. 3.375 Gram(s) IV Intermittent every 8 hours  vancomycin  IVPB 1000 milliGRAM(s) IV Intermittent every 12 hours      dextrose 50% Injectable 25 Gram(s) IV Push once  dextrose 50% Injectable 12.5 Gram(s) IV Push once  dextrose 50% Injectable 25 Gram(s) IV Push once  dextrose 50% Injectable 25 Gram(s) IV Push once  dextrose Oral Gel 15 Gram(s) Oral once PRN  glucagon  Injectable 1 milliGRAM(s) IntraMuscular once  insulin glargine Injectable (LANTUS) 18 Unit(s) SubCutaneous every morning  insulin lispro (ADMELOG) corrective regimen sliding scale   SubCutaneous at bedtime  insulin lispro (ADMELOG) corrective regimen sliding scale   SubCutaneous three times a day before meals  insulin lispro Injectable (ADMELOG) 8 Unit(s) SubCutaneous three times a day before meals  insulin regular Infusion 2 Unit(s)/Hr IV Continuous <Continuous>  predniSONE   Tablet 5 milliGRAM(s) Oral daily        polyethylene glycol 3350 17 Gram(s) Oral daily  senna 2 Tablet(s) Oral at bedtime        dextrose 5% + lactated ringers. 1000 milliLiter(s) IV Continuous <Continuous>  dextrose 5%. 1000 milliLiter(s) IV Continuous <Continuous>  dextrose 5%. 1000 milliLiter(s) IV Continuous <Continuous>    tacrolimus 2 milliGRAM(s) Oral two times a day          LABS:                        13.2   8.01  )-----------( 146      ( 11 Feb 2024 01:00 )             36.2     Hgb Trend: 13.2<--, 13.0<--, 14.9<--  02-11    137  |  110<H>  |  8   ----------------------------<  164<H>  4.3   |  19<L>  |  0.66    Ca    9.4      11 Feb 2024 04:39  Phos  2.0     02-11  Mg     2.20     02-11    TPro  5.5<L>  /  Alb  2.9<L>  /  TBili  0.6  /  DBili  x   /  AST  65<H>  /  ALT  134<H>  /  AlkPhos  68  02-11    Creatinine Trend: 0.66<--, 0.66<--, 0.73<--, 0.68<--, 0.74<--, 1.09<--  PT/INR - ( 10 Feb 2024 21:30 )   PT: 11.5 sec;   INR: 1.03 ratio         PTT - ( 11 Feb 2024 04:39 )  PTT:32.6 sec  Urinalysis Basic - ( 11 Feb 2024 04:39 )    Color: x / Appearance: x / SG: x / pH: x  Gluc: 164 mg/dL / Ketone: x  / Bili: x / Urobili: x   Blood: x / Protein: x / Nitrite: x   Leuk Esterase: x / RBC: x / WBC x   Sq Epi: x / Non Sq Epi: x / Bacteria: x        Venous Blood Gas:  02-11 @ 04:39  7.30/49/25/24/46.0  VBG Lactate: 1.2  Venous Blood Gas:  02-11 @ 01:00  7.33/38/60/20/93.3  VBG Lactate: 0.9  Venous Blood Gas:  02-10 @ 21:30  7.33/42/25/22/50.3  VBG Lactate: 1.3  Venous Blood Gas:  02-10 @ 15:45  7.16/39/48/14/80.0  VBG Lactate: 1.8  Venous Blood Gas:  02-10 @ 10:30  7.30/33/83/16/96.8  VBG Lactate: 1.3  Venous Blood Gas:  02-10 @ 05:45  7.23/27/64/11/91.4  VBG Lactate: 0.9      EKG: oriented to person, place, time and situation

## 2024-02-11 NOTE — PROGRESS NOTE ADULT - SUBJECTIVE AND OBJECTIVE BOX
[facility] DIVISION OF KIDNEY DISEASES AND HYPERTENSION   FOLLOW UP NOTE  --------------------------------------------------------------------------------  Chief Complaint: Pt with Renal Tx on Immunosuppression    24 hour events/subjective: Pt. was seen and examined today. He is "feeling okay". He was unsure of his home medication dosing. Called his pharmacy (med rec is updated). He denied n/v/f/c/sob.     PAST HISTORY  --------------------------------------------------------------------------------  No significant changes to PMH, PSH, FHx, SHx, unless otherwise noted    ALLERGIES & MEDICATIONS  --------------------------------------------------------------------------------  Allergies  penicillin (Hives; Angioedema)    Intolerances    Standing Inpatient Medications  dextrose 5% + lactated ringers. 1000 milliLiter(s) IV Continuous <Continuous>  dextrose 5%. 1000 milliLiter(s) IV Continuous <Continuous>  dextrose 5%. 1000 milliLiter(s) IV Continuous <Continuous>  dextrose 50% Injectable 25 Gram(s) IV Push once  dextrose 50% Injectable 12.5 Gram(s) IV Push once  dextrose 50% Injectable 25 Gram(s) IV Push once  dextrose 50% Injectable 25 Gram(s) IV Push once  glucagon  Injectable 1 milliGRAM(s) IntraMuscular once  heparin  Infusion. 500 Unit(s)/Hr IV Continuous <Continuous>  insulin glargine Injectable (LANTUS) 18 Unit(s) SubCutaneous every morning  insulin lispro (ADMELOG) corrective regimen sliding scale   SubCutaneous at bedtime  insulin lispro (ADMELOG) corrective regimen sliding scale   SubCutaneous three times a day before meals  insulin lispro Injectable (ADMELOG) 8 Unit(s) SubCutaneous three times a day before meals  insulin regular Infusion 2 Unit(s)/Hr IV Continuous <Continuous>  piperacillin/tazobactam IVPB.. 3.375 Gram(s) IV Intermittent every 8 hours  polyethylene glycol 3350 17 Gram(s) Oral daily  predniSONE   Tablet 5 milliGRAM(s) Oral daily  senna 2 Tablet(s) Oral at bedtime  tacrolimus 2 milliGRAM(s) Oral two times a day  vancomycin  IVPB 1000 milliGRAM(s) IV Intermittent every 12 hours    PRN Inpatient Medications  dextrose Oral Gel 15 Gram(s) Oral once PRN    REVIEW OF SYSTEMS  --------------------------------------------------------------------------------  All other systems were reviewed and are negative, except as noted.    VITALS/PHYSICAL EXAM  --------------------------------------------------------------------------------  T(C): 36.7 (02-11-24 @ 08:00), Max: 37.2 (02-10-24 @ 16:00)  HR: 71 (02-11-24 @ 11:00) (70 - 95)  BP: 120/93 (02-11-24 @ 11:00) (101/72 - 136/89)  RR: 17 (02-11-24 @ 11:00) (12 - 26)  SpO2: 100% (02-11-24 @ 11:00) (97% - 100%)  Wt(kg): --  Height (cm): 167.6 (02-10-24 @ 12:50)  Weight (kg): 62.959 (02-10-24 @ 12:50)  BMI (kg/m2): 22.4 (02-10-24 @ 12:50)  BSA (m2): 1.71 (02-10-24 @ 12:50)    02-10-24 @ 07:01  -  02-11-24 @ 07:00  --------------------------------------------------------  IN: 2798.2 mL / OUT: 2200 mL / NET: 598.2 mL    02-11-24 @ 07:01  -  02-11-24 @ 11:42  --------------------------------------------------------  IN: 145.3 mL / OUT: 700 mL / NET: -554.7 mL    Physical Exam:  	Gen: NAD  	HEENT: Anicteric  	Pulm: CTA B/L  	CV: S1S2+  	Abd: Soft, +BS                Transplant site: RLQ non tender, well healed surgical scar.  	Ext: No LE edema B/L  	Neuro: Awake  	Skin: Warm and dry  	Dialysis access: LUE AVF, aneurysmal +thrill    LABS/STUDIES  --------------------------------------------------------------------------------              13.3   7.01  >-----------<  158      [02-11-24 @ 11:03]              37.7     137  |  110  |  8   ----------------------------<  164      [02-11-24 @ 04:39]  4.3   |  19  |  0.66        Ca     9.4     [02-11-24 @ 04:39]      Mg     2.20     [02-11-24 @ 04:39]      Phos  2.0     [02-11-24 @ 04:39]    TPro  5.5  /  Alb  2.9  /  TBili  0.6  /  DBili  x   /  AST  65  /  ALT  134  /  AlkPhos  68  [02-11-24 @ 04:39]    PT/INR: PT 11.5 , INR 1.03       [02-10-24 @ 21:30]  PTT: 52.7       [02-11-24 @ 11:03]    Creatinine Trend:  SCr 0.66 [02-11 @ 04:39]  SCr 0.66 [02-11 @ 01:00]  SCr 0.73 [02-10 @ 21:30]  SCr 0.68 [02-10 @ 15:45]  SCr 0.74 [02-10 @ 10:30]    Tacrolimus (), Serum: 4.9 ng/mL (02-11 @ 04:39)  Tacrolimus (), Serum: 7.5 ng/mL (02-10 @ 15:45)

## 2024-02-11 NOTE — PROGRESS NOTE ADULT - ATTENDING COMMENTS
Agree with above. Seen and examined with team on rounds. Critically ill with DKA. Just off insulin gtt. had a meal and will follow up labs AG now closed. Electrolytes within normal limits.

## 2024-02-11 NOTE — PROGRESS NOTE ADULT - ATTENDING COMMENTS
Patient with previous ESRD secondary to FSGS, renal transplant 2020, now admitted with rectal abscess complicated by DKA.   1. Renal transplant recepient - monitor serial renal function levels.  2. Immunosuppression - medication regimen recommendations as noted above. meds verified with pharmacy [phone] this am   3. Metabolic acidosis - improving   4. Hypophosphatemia - improving Patient with previous ESRD secondary to FSGS, renal transplant 2020, now admitted with rectal abscess complicated by DKA.   1. Renal transplant recepient - monitor serial renal function levels.  2. Immunosuppression - medication regimen recommendations as noted above. meds verified with pharmacy [phone] this am. keep off CellCept   3. Metabolic acidosis - improving   4. Hypophosphatemia - improving

## 2024-02-11 NOTE — PROGRESS NOTE ADULT - ATTENDING COMMENTS
38M with HTN, hx ESRD on HD via LUE AVF for 13 years until renal transplant 3 years ago at [facility].  Post-transplant developed DM.  Now admitted 2/9 with rectal pain and found be in DKA.    WBC 8  Creatinine nl  UA (-)  UC pending  CT with perianal 2.1 x 1.5 x 2.6 abscess   s/p I+D  culture pending    Overall, 38M with renal transplant (on prednisone / cellcept) here with perirectal abscess in DKA with leukocytosis s/p I+D, also with DVT  - zosyn with with vancomycin  - monitor vancomycin level  - f/u I+D cultures  - glycemic control  - trend wbc    Please call the ID service [phone] with questions or concerns over the weekend

## 2024-02-11 NOTE — PROGRESS NOTE ADULT - SUBJECTIVE AND OBJECTIVE BOX
Follow Up:      Interval History/ROS:Patient is a 38y old  Male who presents with a chief complaint of DKA (10 Feb 2024 17:23)  ID following for perirectal abscess.  Under ICU for DKA management, s/p I&D of perirectal abscess, Afebrile today.     REVIEW OF SYSTEMS  [  ] ROS unobtainable because:    [ x ] All other systems negative except as noted below    Constitutional:  [ ] fever [ ] chills  [ ] weight loss  [ ]night sweat  [ ]poor appetite/PO intake [ ]fatigue   Skin:  [ ] rash [ ] phlebitis	  Eyes: [ ] icterus [ ] pain  [ ] discharge	  ENMT: [ ] sore throat  [ ] thrush [ ] ulcers [ ] exudates [ ]anosmia  Respiratory: [ ] dyspnea [ ] hemoptysis [ ] cough [ ] sputum	  Cardiovascular:  [ ] chest pain [ ] palpitations [ ] edema	  Gastrointestinal:  [ ] nausea [ ] vomiting [ ] diarrhea [ ] constipation [ ] pain [x]analpain 	  Genitourinary:  [ ] dysuria [ ] frequency [ ] hematuria [ ] discharge [ ] flank pain  [ ] incontinence  Musculoskeletal:  [ ] myalgias [ ] arthralgias [ ] arthritis  [ ] back pain  Neurological:  [ ] headache [ ] weakness [ ] seizures  [ ] confusion/altered mental status    Allergies  penicillin (Hives; Angioedema)    ANTIMICROBIALS:    piperacillin/tazobactam IVPB.. 3.375 every 8 hours  vancomycin  IVPB 1000 every 12 hours    OTHER MEDS: MEDICATIONS  (STANDING):  dextrose 50% Injectable 25 once  dextrose 50% Injectable 12.5 once  dextrose 50% Injectable 25 once  dextrose 50% Injectable 25 once  dextrose Oral Gel 15 once PRN  glucagon  Injectable 1 once  heparin  Infusion. 500 <Continuous>  insulin glargine Injectable (LANTUS) 18 every morning  insulin lispro (ADMELOG) corrective regimen sliding scale  at bedtime  insulin lispro (ADMELOG) corrective regimen sliding scale  three times a day before meals  insulin lispro Injectable (ADMELOG) 8 three times a day before meals  insulin regular Infusion 2 <Continuous>  polyethylene glycol 3350 17 daily  predniSONE   Tablet 5 daily  senna 2 at bedtime  tacrolimus 2 two times a day    Vital Signs Last 24 Hrs  T(F): 98.1 (02-11-24 @ 08:00), Max: 99 (02-10-24 @ 16:00)    Vital Signs Last 24 Hrs  HR: 78 (02-11-24 @ 08:00) (70 - 95)  BP: 124/99 (02-11-24 @ 08:00) (101/72 - 136/89)  RR: 13 (02-11-24 @ 08:00)  SpO2: 100% (02-11-24 @ 08:00) (97% - 100%)  Wt(kg): --    EXAM:    GA: NAD  HEENT: oral cavity no lesion  CV: nl S1/S2, no RMG  Lungs: CTAB, no distress  Abd: BS+, soft, nontender, no rebounding pain  Ext: no edema  Neuro: No focal deficits  Skin: Intact  IV: no phlebitis    Labs:                        13.2   8.01  )-----------( 146      ( 11 Feb 2024 01:00 )             36.2     02-11    137  |  110<H>  |  8   ----------------------------<  164<H>  4.3   |  19<L>  |  0.66    Ca    9.4      11 Feb 2024 04:39  Phos  2.0     02-11  Mg     2.20     02-11    TPro  5.5<L>  /  Alb  2.9<L>  /  TBili  0.6  /  DBili  x   /  AST  65<H>  /  ALT  134<H>  /  AlkPhos  68  02-11    WBC Trend:  WBC Count: 8.01 (02-11-24 @ 01:00)  WBC Count: 7.01 (02-10-24 @ 15:45)  WBC Count: 11.16 (02-10-24 @ 03:30)    Creatine Trend:  Creatinine: 0.66 (02-11)  Creatinine: 0.66 (02-11)  Creatinine: 0.73 (02-10)  Creatinine: 0.68 (02-10)    Liver Biochemical Testing Trend:  Alanine Aminotransferase (ALT/SGPT): 134 *H* (02-11)  Alanine Aminotransferase (ALT/SGPT): 133 *H* (02-11)  Alanine Aminotransferase (ALT/SGPT): 142 *H* (02-10)  Alanine Aminotransferase (ALT/SGPT): 141 *H* (02-10)  Alanine Aminotransferase (ALT/SGPT): 139 *H* (02-10)  Aspartate Aminotransferase (AST/SGOT): 65 (02-11-24 @ 04:39)  Aspartate Aminotransferase (AST/SGOT): 65 (02-11-24 @ 01:00)  Aspartate Aminotransferase (AST/SGOT): 67 (02-10-24 @ 21:30)  Aspartate Aminotransferase (AST/SGOT): 63 (02-10-24 @ 15:45)  Aspartate Aminotransferase (AST/SGOT): 56 (02-10-24 @ 10:30)  Bilirubin Total: 0.6 (02-11)  Bilirubin Total: 0.6 (02-11)  Bilirubin Total: 0.6 (02-10)  Bilirubin Total: 0.6 (02-10)  Bilirubin Total: 0.4 (02-10)    Trend LDH    Urinalysis Basic - ( 11 Feb 2024 04:39 )    Color: x / Appearance: x / SG: x / pH: x  Gluc: 164 mg/dL / Ketone: x  / Bili: x / Urobili: x   Blood: x / Protein: x / Nitrite: x   Leuk Esterase: x / RBC: x / WBC x   Sq Epi: x / Non Sq Epi: x / Bacteria: x    MICROBIOLOGY:      Culture - Abscess with Gram Stain (collected 10 Feb 2024 11:45)  Source: .Abscess perianal abscess    Blood Gas Venous - Lactate: 1.2 (02-11 @ 04:39)  Blood Gas Venous - Lactate: 0.9 (02-11 @ 01:00)  Blood Gas Venous - Lactate: 1.3 (02-10 @ 21:30)  Blood Gas Venous - Lactate: 1.8 (02-10 @ 15:45)    RADIOLOGY:  imaging below personally reviewed    CT Abdomen and Pelvis w/ IV Cont:   ACC: [id number] EXAM:  CT ABDOMEN AND PELVIS IC   ORDERED BY: JOSE D QUINONES     PROCEDURE DATE:  02/10/2024      INTERPRETATION:  CLINICAL INFORMATION: Concern for perirectal abscess.    COMPARISON: None.    CONTRAST/COMPLICATIONS:  IV Contrast: Omnipaque 350  75 cc administered   25 cc discarded  Oral Contrast: NONE  Complications: None reported at time of study completion    PROCEDURE:  CT of the Abdomen and Pelvis was performed.  Sagittal and coronal reformats were performed.    FINDINGS:  LOWER CHEST: Within normal limits.    LIVER: Within normal limits.  BILE DUCTS: Normal caliber.  GALLBLADDER: Within normal limits.  SPLEEN: Within normal limits.  PANCREAS: 7 mm cystic lesion within the pancreatic head.  ADRENALS: Within normal limits.  KIDNEYS/URETERS: Atrophic left kidney. Status post right nephrectomy.   Right pelvis transplant kidney. There is hydronephrosis of the transplant   kidney.    BLADDER: Within normal limits.  REPRODUCTIVE ORGANS: Prostate within normal limits.    BOWEL: No bowel obstruction. Appendix is normal. Large amount of stool   compatible with constipation. Question wall thickening of the stomach   versus underdistention. Complex rim-enhancing fluid collection along the   distal anal verge measuring 2.1 x 1.5 x 2.6 cm, consistent with a   perianal abscess.  PERITONEUM: No ascites.  VESSELS: Atherosclerotic changes.  RETROPERITONEUM/LYMPH NODES: No lymphadenopathy.  ABDOMINAL WALL: Within normal limits.  BONES: Degenerative changes.    IMPRESSION:  Perianal abscess measuring 2.1 x 1.5 x 2.6 cm, seen at the anal verge    7 mm cystic lesion of the pancreatic head. One year follow-up advised,   preferably with MRI.    Right pelvic renal transplant with hydronephrosis.    Probable constipation.    Question gastritis versus underdistention.    --- End of Report ---          OCTAVIANO CLEMENT DO; Resident Radiologist  This document has been electronically signed.  ROOPA SHEN MD; Attending Radiologist  This document has been electronically signed. Feb 10 2024  7:07AM (02-10-24 @ 06:18)       Patient is a 38y old  Male who presents with a chief complaint of DKA (10 Feb 2024 17:23)    ID following for perirectal abscess.    Under ICU for DKA management, s/p I&D of perirectal abscess, Afebrile today.     REVIEW OF SYSTEMS  [  ] ROS unobtainable because:    [ x ] All other systems negative except as noted below    Constitutional:  [ ] fever [ ] chills  [ ] weight loss  [ ]night sweat  [ ]poor appetite/PO intake [ ]fatigue   Skin:  [ ] rash [ ] phlebitis	  Eyes: [ ] icterus [ ] pain  [ ] discharge	  ENMT: [ ] sore throat  [ ] thrush [ ] ulcers [ ] exudates [ ]anosmia  Respiratory: [ ] dyspnea [ ] hemoptysis [ ] cough [ ] sputum	  Cardiovascular:  [ ] chest pain [ ] palpitations [ ] edema	  Gastrointestinal:  [ ] nausea [ ] vomiting [ ] diarrhea [ ] constipation [ ] pain [x]analpain 	  Genitourinary:  [ ] dysuria [ ] frequency [ ] hematuria [ ] discharge [ ] flank pain  [ ] incontinence  Musculoskeletal:  [ ] myalgias [ ] arthralgias [ ] arthritis  [ ] back pain  Neurological:  [ ] headache [ ] weakness [ ] seizures  [ ] confusion/altered mental status    PAST MEDICAL & SURGICAL HISTORY:  DM (diabetes mellitus)  S/P kidney transplant  Hx ESRD was previously on HD via LUE AVF for 13 years until transplant    Allergies  penicillin (Hives; Angioedema)    ANTIMICROBIALS:    piperacillin/tazobactam IVPB.. 3.375 every 8 hours (2/10-)  vancomycin  IVPB 1000 every 12 hours (2/10-)    MEDICATIONS  (STANDING):  heparin  Infusion. 500 <Continuous>  insulin glargine Injectable (LANTUS) 18 every morning  insulin lispro (ADMELOG) corrective regimen sliding scale  at bedtime  insulin lispro (ADMELOG) corrective regimen sliding scale  three times a day before meals  insulin lispro Injectable (ADMELOG) 8 three times a day before meals  insulin regular Infusion 2 <Continuous>  polyethylene glycol 3350 17 daily  predniSONE   Tablet 5 daily  senna 2 at bedtime  tacrolimus 2 two times a day    Vital Signs Last 24 Hrs  T(F): 98.1 (02-11-24 @ 08:00), Max: 99 (02-10-24 @ 16:00)  HR: 71 (02-11-24 @ 11:00)  BP: 120/93 (02-11-24 @ 11:00)  RR: 17 (02-11-24 @ 11:00)  SpO2: 100% (02-11-24 @ 11:00) (97% - 100%)  Wt(kg): --    GA: NAD  HEENT: oral cavity no lesion  CV: nl S1/S2, no RMG  Lungs: CTAB, no distress  Abd: BS+, soft, nontender, no rebounding pain  Ext: no edema  Neuro: No focal deficits  Skin: Intact; perirectal abscess with drain, still induration but smaller  IV: no phlebitis                       13.2   8.01  )-----------( 146      ( 11 Feb 2024 01:00 )             36.2     137  |  110<H>  |  8   ----------------------------<  164<H>  4.3   |  19<L>  |  0.66    Ca    9.4      11 Feb 2024 04:39  Phos  2.0     02-11  Mg     2.20     02-11    TPro  5.5<L>  /  Alb  2.9<L>  /  TBili  0.6  /  DBili  x   /  AST  65<H>  /  ALT  134<H>  /  AlkPhos  68  02-11    Urinalysis + Microscopic Examination (02.10.24 @ 03:10)   pH Urine: 6.0  Urine Appearance: Clear  Color: Yellow  Specific Gravity: 1.038  Protein, Urine: Trace mg/dL  Glucose Qualitative, Urine: >=1000 mg/dL  Ketone - Urine: >=160 mg/dL  Blood, Urine: Negative  Bilirubin: Negative  Urobilinogen: 0.2 mg/dL  Leukocyte Esterase Concentration: Negative  Nitrite: Negative  White Blood Cell - Urine: 0 /HPF  Red Blood Cell - Urine: 0 /HPF  Bacteria: Negative /HPF  Cast: 0 /LPF  Epithelial Cells: 0 /HPF  Urinalysis Basic - ( 10 Feb 2024 10:30 )    MICROBIOLOGY:  2/10  pending    Culture - Abscess with Gram Stain (collected 02-10-24 @ 11:45)  Source: .Abscess perianal abscess  Gram Stain (02-10-24 @ 18:12):    Few polymorphonuclear leukocytes per low power field    Few Gram positive cocci in pairs per oil power field    RADIOLOGY:  imaging below personally reviewed and agree with findings    CT Abdomen and Pelvis w/ IV Cont (02.10.24 @ 06:18) >  IMPRESSION:  Perianal abscess measuring 2.1 x 1.5 x 2.6 cm, seen at the anal verge.  7 mm cystic lesion of the pancreatic head. One year follow-up advised, preferably with MRI.  Right pelvic renal transplant with hydronephrosis.  Probable constipation.  Question gastritis versus underdistention.    US Duplex Venous Lower Ext Complete, Bilateral (02.10.24 @ 05:51) >  Acute deep venous thrombosis: above and below the knee.

## 2024-02-11 NOTE — PROGRESS NOTE ADULT - ASSESSMENT
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    Neuro  No active issues  Pain control, as needed    Card  HTN  Home Meds: Nifedipine 30, Coreg 12.5 BID, Lisinopril 5  - normotensive inpatient  - holding home meds since infected, will resume if needed    Pulm  No active issues    GI/Hep  Diet: NPO for now on insulin gtt  Bowel regimen since with rectal pain  - surgery recs    /Renal  S/p renal transplant, not on HD.  Hydronephrosis on CT  Home Meds: Pred 5, Mycophenolate 500 BID, Tacrolimu 2 BID  - transplant renal consulted, f/u recs  - monitor SCr,   - consider repeat Renal sono to eval hydro  - monitor I/Os  - hold tacro and cellcept until transplant renal eval  - f/u renal regarding hydronephrosis  - c/w prednisone 5mg qd    Heme/Onc  Patient with RLE extensive DVT above and below knee  Unclear cause  - Hep gtt for now, eventual DOAC  - Hypercoagulable workup    ID  Perianal abscess  s/p I&D By surgery  - wound culture in lab, f/u results  - BCx x2, UCx in lab  - vanc and zosyn  - MRSA swab  - ID consulted, f/u recs    Endocrine  DM with DKA: pH 7.23, AG 28, Bicarb 8, BHB 7.1  Likely exacerbated by infection, as patient reports good insulin adherence, though A1C is > 12.   Home med: Insulin 18U Nightly and 8U pre-meals.   - insulin gtt, replete K > 5, Keep glucose > 250, VBG and BMP q4 until gap closure, FS q1, bridge to basal/bolus once gap closes.   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    Neuro  No active issues  Pain control, as needed    Card  HTN  Home Meds: Nifedipine 30, Coreg 12.5 BID, Lisinopril 5  - normotensive inpatient  - resume home meds once pt becomes hypertensive    Pulm  No active issues    GI/Hep  Diet: Restarted on diet   Bowel regimen since with rectal pain  - surgery recs    /Renal  S/p renal transplant, not on HD.  Hydronephrosis on CT  Home Meds: Pred 5, Mycophenolate 500 BID, Tacrolimu 2 BID  - transplant renal consulted, f/u recs  - monitor SCr,   - monitor I/Os  - hold cellcept   - C/w Tacro   - F/u tacro levels.  - f/u renal regarding hydronephrosis  - c/w prednisone 5mg qd    Heme/Onc  Patient with RLE extensive DVT above and below knee  Unclear cause  - Hep gtt for now, eventual DOAC  - Hypercoagulable workup    ID  Perianal abscess  s/p I&D By surgery  - wound culture in lab, f/u results  - BCx x2, UCx in lab  - vanc and zosyn  - MRSA swab  - ID consulted, f/u recs    Endocrine  DM with DKA: pH 7.23, AG 28, Bicarb 8, BHB 7.1  Likely exacerbated by infection, as patient reports good insulin adherence, though A1C is > 12.   Home med: Insulin 18U Nightly and 8U pre-meals.   - Off Insulin  - Restarted on Lantus 18 units daily in am.   - C/w Ademlog 6 units TID w/ meals - Endo recommended 6 units.  - endocrine consulted, f/u recs  - lipid panel    Ethics  Full Code

## 2024-02-12 DIAGNOSIS — E87.6 HYPOKALEMIA: ICD-10-CM

## 2024-02-12 DIAGNOSIS — Z29.9 ENCOUNTER FOR PROPHYLACTIC MEASURES, UNSPECIFIED: ICD-10-CM

## 2024-02-12 DIAGNOSIS — I82.409 ACUTE EMBOLISM AND THROMBOSIS OF UNSPECIFIED DEEP VEINS OF UNSPECIFIED LOWER EXTREMITY: ICD-10-CM

## 2024-02-12 DIAGNOSIS — K61.0 ANAL ABSCESS: ICD-10-CM

## 2024-02-12 LAB
-  AMPICILLIN/SULBACTAM: SIGNIFICANT CHANGE UP
-  CEFAZOLIN: SIGNIFICANT CHANGE UP
-  CLINDAMYCIN: SIGNIFICANT CHANGE UP
-  ERYTHROMYCIN: SIGNIFICANT CHANGE UP
-  GENTAMICIN: SIGNIFICANT CHANGE UP
-  OXACILLIN: SIGNIFICANT CHANGE UP
-  PENICILLIN: SIGNIFICANT CHANGE UP
-  RIFAMPIN: SIGNIFICANT CHANGE UP
-  TETRACYCLINE: SIGNIFICANT CHANGE UP
-  TRIMETHOPRIM/SULFAMETHOXAZOLE: SIGNIFICANT CHANGE UP
-  VANCOMYCIN: SIGNIFICANT CHANGE UP
ALBUMIN SERPL ELPH-MCNC: 2.5 G/DL — LOW (ref 3.3–5)
ALBUMIN SERPL ELPH-MCNC: 2.6 G/DL — LOW (ref 3.3–5)
ALBUMIN SERPL ELPH-MCNC: 3.1 G/DL — LOW (ref 3.3–5)
ALP SERPL-CCNC: 62 U/L — SIGNIFICANT CHANGE UP (ref 40–120)
ALP SERPL-CCNC: 69 U/L — SIGNIFICANT CHANGE UP (ref 40–120)
ALP SERPL-CCNC: 76 U/L — SIGNIFICANT CHANGE UP (ref 40–120)
ALT FLD-CCNC: 136 U/L — HIGH (ref 4–41)
ALT FLD-CCNC: 150 U/L — HIGH (ref 4–41)
ALT FLD-CCNC: 157 U/L — HIGH (ref 4–41)
ANION GAP SERPL CALC-SCNC: 10 MMOL/L — SIGNIFICANT CHANGE UP (ref 7–14)
ANION GAP SERPL CALC-SCNC: 10 MMOL/L — SIGNIFICANT CHANGE UP (ref 7–14)
ANION GAP SERPL CALC-SCNC: 11 MMOL/L — SIGNIFICANT CHANGE UP (ref 7–14)
ANION GAP SERPL CALC-SCNC: 16 MMOL/L — HIGH (ref 7–14)
APCR PPP: 1.86 RATIO — LOW
APTT BLD: 34.9 SEC — SIGNIFICANT CHANGE UP (ref 24.5–35.6)
AST SERPL-CCNC: 106 U/L — HIGH (ref 4–40)
AST SERPL-CCNC: 107 U/L — HIGH (ref 4–40)
AST SERPL-CCNC: 97 U/L — HIGH (ref 4–40)
B2 GLYCOPROT1 AB SER QL: NEGATIVE — SIGNIFICANT CHANGE UP
BASE EXCESS BLDV CALC-SCNC: -0.4 MMOL/L — SIGNIFICANT CHANGE UP (ref -2–3)
BASE EXCESS BLDV CALC-SCNC: -1.8 MMOL/L — SIGNIFICANT CHANGE UP (ref -2–3)
BASOPHILS # BLD AUTO: 0.01 K/UL — SIGNIFICANT CHANGE UP (ref 0–0.2)
BASOPHILS NFR BLD AUTO: 0.2 % — SIGNIFICANT CHANGE UP (ref 0–2)
BILIRUB SERPL-MCNC: 0.6 MG/DL — SIGNIFICANT CHANGE UP (ref 0.2–1.2)
BLOOD GAS VENOUS COMPREHENSIVE RESULT: SIGNIFICANT CHANGE UP
BUN SERPL-MCNC: 8 MG/DL — SIGNIFICANT CHANGE UP (ref 7–23)
BUN SERPL-MCNC: 8 MG/DL — SIGNIFICANT CHANGE UP (ref 7–23)
BUN SERPL-MCNC: 9 MG/DL — SIGNIFICANT CHANGE UP (ref 7–23)
BUN SERPL-MCNC: 9 MG/DL — SIGNIFICANT CHANGE UP (ref 7–23)
CA-I SERPL-SCNC: 1.41 MMOL/L — HIGH (ref 1.15–1.33)
CALCIUM SERPL-MCNC: 8.6 MG/DL — SIGNIFICANT CHANGE UP (ref 8.4–10.5)
CALCIUM SERPL-MCNC: 9.1 MG/DL — SIGNIFICANT CHANGE UP (ref 8.4–10.5)
CALCIUM SERPL-MCNC: 9.5 MG/DL — SIGNIFICANT CHANGE UP (ref 8.4–10.5)
CALCIUM SERPL-MCNC: 9.6 MG/DL — SIGNIFICANT CHANGE UP (ref 8.4–10.5)
CARDIOLIPIN AB SER-ACNC: NEGATIVE — SIGNIFICANT CHANGE UP
CHLORIDE BLDV-SCNC: 104 MMOL/L — SIGNIFICANT CHANGE UP (ref 96–108)
CHLORIDE BLDV-SCNC: 94 MMOL/L — LOW (ref 96–108)
CHLORIDE SERPL-SCNC: 103 MMOL/L — SIGNIFICANT CHANGE UP (ref 98–107)
CHLORIDE SERPL-SCNC: 104 MMOL/L — SIGNIFICANT CHANGE UP (ref 98–107)
CHLORIDE SERPL-SCNC: 91 MMOL/L — LOW (ref 98–107)
CHLORIDE SERPL-SCNC: 98 MMOL/L — SIGNIFICANT CHANGE UP (ref 98–107)
CO2 BLDV-SCNC: 26.1 MMOL/L — HIGH (ref 22–26)
CO2 BLDV-SCNC: 26.2 MMOL/L — HIGH (ref 22–26)
CO2 SERPL-SCNC: 21 MMOL/L — LOW (ref 22–31)
CO2 SERPL-SCNC: 23 MMOL/L — SIGNIFICANT CHANGE UP (ref 22–31)
CO2 SERPL-SCNC: 25 MMOL/L — SIGNIFICANT CHANGE UP (ref 22–31)
CO2 SERPL-SCNC: 25 MMOL/L — SIGNIFICANT CHANGE UP (ref 22–31)
CREAT SERPL-MCNC: 0.72 MG/DL — SIGNIFICANT CHANGE UP (ref 0.5–1.3)
CREAT SERPL-MCNC: 0.73 MG/DL — SIGNIFICANT CHANGE UP (ref 0.5–1.3)
CREAT SERPL-MCNC: 0.74 MG/DL — SIGNIFICANT CHANGE UP (ref 0.5–1.3)
CREAT SERPL-MCNC: 0.76 MG/DL — SIGNIFICANT CHANGE UP (ref 0.5–1.3)
DRVVT RATIO: 0.85 RATIO — SIGNIFICANT CHANGE UP (ref 0–1.21)
DRVVT SCREEN TO CONFIRM RATIO: NEGATIVE — SIGNIFICANT CHANGE UP
EGFR: 119 ML/MIN/1.73M2 — SIGNIFICANT CHANGE UP
EGFR: 120 ML/MIN/1.73M2 — SIGNIFICANT CHANGE UP
EGFR: 120 ML/MIN/1.73M2 — SIGNIFICANT CHANGE UP
EGFR: 121 ML/MIN/1.73M2 — SIGNIFICANT CHANGE UP
EOSINOPHIL # BLD AUTO: 0.09 K/UL — SIGNIFICANT CHANGE UP (ref 0–0.5)
EOSINOPHIL NFR BLD AUTO: 1.5 % — SIGNIFICANT CHANGE UP (ref 0–6)
GAS PNL BLDV: 125 MMOL/L — LOW (ref 136–145)
GAS PNL BLDV: 133 MMOL/L — LOW (ref 136–145)
GAS PNL BLDV: SIGNIFICANT CHANGE UP
GLUCOSE BLDC GLUCOMTR-MCNC: 138 MG/DL — HIGH (ref 70–99)
GLUCOSE BLDC GLUCOMTR-MCNC: 171 MG/DL — HIGH (ref 70–99)
GLUCOSE BLDC GLUCOMTR-MCNC: 181 MG/DL — HIGH (ref 70–99)
GLUCOSE BLDC GLUCOMTR-MCNC: 185 MG/DL — HIGH (ref 70–99)
GLUCOSE BLDC GLUCOMTR-MCNC: 196 MG/DL — HIGH (ref 70–99)
GLUCOSE BLDC GLUCOMTR-MCNC: 205 MG/DL — HIGH (ref 70–99)
GLUCOSE BLDC GLUCOMTR-MCNC: 246 MG/DL — HIGH (ref 70–99)
GLUCOSE BLDV-MCNC: 233 MG/DL — HIGH (ref 70–99)
GLUCOSE BLDV-MCNC: 555 MG/DL — CRITICAL HIGH (ref 70–99)
GLUCOSE SERPL-MCNC: 189 MG/DL — HIGH (ref 70–99)
GLUCOSE SERPL-MCNC: 235 MG/DL — HIGH (ref 70–99)
GLUCOSE SERPL-MCNC: 387 MG/DL — HIGH (ref 70–99)
GLUCOSE SERPL-MCNC: 534 MG/DL — CRITICAL HIGH (ref 70–99)
HCO3 BLDV-SCNC: 25 MMOL/L — SIGNIFICANT CHANGE UP (ref 22–29)
HCO3 BLDV-SCNC: 25 MMOL/L — SIGNIFICANT CHANGE UP (ref 22–29)
HCT VFR BLD CALC: 38 % — LOW (ref 39–50)
HCT VFR BLD CALC: 40.2 % — SIGNIFICANT CHANGE UP (ref 39–50)
HCT VFR BLDA CALC: 40 % — SIGNIFICANT CHANGE UP (ref 39–51)
HCT VFR BLDA CALC: 42 % — SIGNIFICANT CHANGE UP (ref 39–51)
HGB BLD CALC-MCNC: 13.3 G/DL — SIGNIFICANT CHANGE UP (ref 12.6–17.4)
HGB BLD CALC-MCNC: 13.9 G/DL — SIGNIFICANT CHANGE UP (ref 12.6–17.4)
HGB BLD-MCNC: 13 G/DL — SIGNIFICANT CHANGE UP (ref 13–17)
HGB BLD-MCNC: 13.7 G/DL — SIGNIFICANT CHANGE UP (ref 13–17)
IANC: 3.41 K/UL — SIGNIFICANT CHANGE UP (ref 1.8–7.4)
IMM GRANULOCYTES NFR BLD AUTO: 0.3 % — SIGNIFICANT CHANGE UP (ref 0–0.9)
LACTATE BLDV-MCNC: 0.9 MMOL/L — SIGNIFICANT CHANGE UP (ref 0.5–2)
LACTATE BLDV-MCNC: 0.9 MMOL/L — SIGNIFICANT CHANGE UP (ref 0.5–2)
LYMPHOCYTES # BLD AUTO: 1.2 K/UL — SIGNIFICANT CHANGE UP (ref 1–3.3)
LYMPHOCYTES # BLD AUTO: 20.5 % — SIGNIFICANT CHANGE UP (ref 13–44)
MAGNESIUM SERPL-MCNC: 1.5 MG/DL — LOW (ref 1.6–2.6)
MAGNESIUM SERPL-MCNC: 1.5 MG/DL — LOW (ref 1.6–2.6)
MAGNESIUM SERPL-MCNC: 2.6 MG/DL — SIGNIFICANT CHANGE UP (ref 1.6–2.6)
MAGNESIUM SERPL-MCNC: > 9.7 MG/DL — SIGNIFICANT CHANGE UP (ref 1.6–2.6)
MCHC RBC-ENTMCNC: 31.7 PG — SIGNIFICANT CHANGE UP (ref 27–34)
MCHC RBC-ENTMCNC: 32 PG — SIGNIFICANT CHANGE UP (ref 27–34)
MCHC RBC-ENTMCNC: 34.1 GM/DL — SIGNIFICANT CHANGE UP (ref 32–36)
MCHC RBC-ENTMCNC: 34.2 GM/DL — SIGNIFICANT CHANGE UP (ref 32–36)
MCV RBC AUTO: 93.1 FL — SIGNIFICANT CHANGE UP (ref 80–100)
MCV RBC AUTO: 93.6 FL — SIGNIFICANT CHANGE UP (ref 80–100)
METHOD TYPE: SIGNIFICANT CHANGE UP
MONOCYTES # BLD AUTO: 1.13 K/UL — HIGH (ref 0–0.9)
MONOCYTES NFR BLD AUTO: 19.3 % — HIGH (ref 2–14)
NEUTROPHILS # BLD AUTO: 3.41 K/UL — SIGNIFICANT CHANGE UP (ref 1.8–7.4)
NEUTROPHILS NFR BLD AUTO: 58.2 % — SIGNIFICANT CHANGE UP (ref 43–77)
NRBC # BLD: 0 /100 WBCS — SIGNIFICANT CHANGE UP (ref 0–0)
NRBC # BLD: 0 /100 WBCS — SIGNIFICANT CHANGE UP (ref 0–0)
NRBC # FLD: 0 K/UL — SIGNIFICANT CHANGE UP (ref 0–0)
NRBC # FLD: 0 K/UL — SIGNIFICANT CHANGE UP (ref 0–0)
PCO2 BLDV: 42 MMHG — SIGNIFICANT CHANGE UP (ref 42–55)
PCO2 BLDV: 48 MMHG — SIGNIFICANT CHANGE UP (ref 42–55)
PH BLDV: 7.32 — SIGNIFICANT CHANGE UP (ref 7.32–7.43)
PH BLDV: 7.38 — SIGNIFICANT CHANGE UP (ref 7.32–7.43)
PHOSPHATE SERPL-MCNC: 2 MG/DL — LOW (ref 2.5–4.5)
PHOSPHATE SERPL-MCNC: 2.8 MG/DL — SIGNIFICANT CHANGE UP (ref 2.5–4.5)
PHOSPHATE SERPL-MCNC: 2.9 MG/DL — SIGNIFICANT CHANGE UP (ref 2.5–4.5)
PHOSPHATE SERPL-MCNC: 4 MG/DL — SIGNIFICANT CHANGE UP (ref 2.5–4.5)
PLATELET # BLD AUTO: 141 K/UL — LOW (ref 150–400)
PLATELET # BLD AUTO: 159 K/UL — SIGNIFICANT CHANGE UP (ref 150–400)
PO2 BLDV: 100 MMHG — HIGH (ref 25–45)
PO2 BLDV: 33 MMHG — SIGNIFICANT CHANGE UP (ref 25–45)
POTASSIUM BLDV-SCNC: 3.4 MMOL/L — LOW (ref 3.5–5.1)
POTASSIUM BLDV-SCNC: 3.4 MMOL/L — LOW (ref 3.5–5.1)
POTASSIUM SERPL-MCNC: 3.4 MMOL/L — LOW (ref 3.5–5.3)
POTASSIUM SERPL-MCNC: 3.6 MMOL/L — SIGNIFICANT CHANGE UP (ref 3.5–5.3)
POTASSIUM SERPL-MCNC: 4 MMOL/L — SIGNIFICANT CHANGE UP (ref 3.5–5.3)
POTASSIUM SERPL-MCNC: 4.5 MMOL/L — SIGNIFICANT CHANGE UP (ref 3.5–5.3)
POTASSIUM SERPL-SCNC: 3.4 MMOL/L — LOW (ref 3.5–5.3)
POTASSIUM SERPL-SCNC: 3.6 MMOL/L — SIGNIFICANT CHANGE UP (ref 3.5–5.3)
POTASSIUM SERPL-SCNC: 4 MMOL/L — SIGNIFICANT CHANGE UP (ref 3.5–5.3)
POTASSIUM SERPL-SCNC: 4.5 MMOL/L — SIGNIFICANT CHANGE UP (ref 3.5–5.3)
PROT C ACT/NOR PPP: 72 % — LOW (ref 74–150)
PROT S FREE PPP-ACNC: 36 % — LOW (ref 70–130)
PROT SERPL-MCNC: 5.5 G/DL — LOW (ref 6–8.3)
PROT SERPL-MCNC: 5.7 G/DL — LOW (ref 6–8.3)
PROT SERPL-MCNC: 6.2 G/DL — SIGNIFICANT CHANGE UP (ref 6–8.3)
RBC # BLD: 4.06 M/UL — LOW (ref 4.2–5.8)
RBC # BLD: 4.32 M/UL — SIGNIFICANT CHANGE UP (ref 4.2–5.8)
RBC # FLD: 12.6 % — SIGNIFICANT CHANGE UP (ref 10.3–14.5)
RBC # FLD: 12.6 % — SIGNIFICANT CHANGE UP (ref 10.3–14.5)
SAO2 % BLDV: 73 % — SIGNIFICANT CHANGE UP (ref 67–88)
SAO2 % BLDV: 97.7 % — HIGH (ref 67–88)
SODIUM SERPL-SCNC: 128 MMOL/L — LOW (ref 135–145)
SODIUM SERPL-SCNC: 134 MMOL/L — LOW (ref 135–145)
SODIUM SERPL-SCNC: 137 MMOL/L — SIGNIFICANT CHANGE UP (ref 135–145)
SODIUM SERPL-SCNC: 138 MMOL/L — SIGNIFICANT CHANGE UP (ref 135–145)
VANCOMYCIN TROUGH SERPL-MCNC: <4 UG/ML — LOW (ref 10–20)
WBC # BLD: 4.17 K/UL — SIGNIFICANT CHANGE UP (ref 3.8–10.5)
WBC # BLD: 5.86 K/UL — SIGNIFICANT CHANGE UP (ref 3.8–10.5)
WBC # FLD AUTO: 4.17 K/UL — SIGNIFICANT CHANGE UP (ref 3.8–10.5)
WBC # FLD AUTO: 5.86 K/UL — SIGNIFICANT CHANGE UP (ref 3.8–10.5)

## 2024-02-12 PROCEDURE — 76770 US EXAM ABDO BACK WALL COMP: CPT | Mod: 26

## 2024-02-12 PROCEDURE — 99233 SBSQ HOSP IP/OBS HIGH 50: CPT | Mod: GC

## 2024-02-12 PROCEDURE — 99232 SBSQ HOSP IP/OBS MODERATE 35: CPT

## 2024-02-12 RX ORDER — INSULIN GLARGINE 100 [IU]/ML
18 INJECTION, SOLUTION SUBCUTANEOUS EVERY MORNING
Refills: 0 | Status: DISCONTINUED | OUTPATIENT
Start: 2024-02-12 | End: 2024-02-12

## 2024-02-12 RX ORDER — POTASSIUM CHLORIDE 20 MEQ
40 PACKET (EA) ORAL ONCE
Refills: 0 | Status: COMPLETED | OUTPATIENT
Start: 2024-02-12 | End: 2024-02-12

## 2024-02-12 RX ORDER — INSULIN LISPRO 100/ML
9 VIAL (ML) SUBCUTANEOUS
Refills: 0 | Status: DISCONTINUED | OUTPATIENT
Start: 2024-02-12 | End: 2024-02-13

## 2024-02-12 RX ORDER — INSULIN GLARGINE 100 [IU]/ML
22 INJECTION, SOLUTION SUBCUTANEOUS EVERY MORNING
Refills: 0 | Status: DISCONTINUED | OUTPATIENT
Start: 2024-02-12 | End: 2024-02-12

## 2024-02-12 RX ORDER — CHLORHEXIDINE GLUCONATE 213 G/1000ML
1 SOLUTION TOPICAL DAILY
Refills: 0 | Status: DISCONTINUED | OUTPATIENT
Start: 2024-02-12 | End: 2024-02-14

## 2024-02-12 RX ORDER — MUPIROCIN 20 MG/G
1 OINTMENT TOPICAL EVERY 12 HOURS
Refills: 0 | Status: DISCONTINUED | OUTPATIENT
Start: 2024-02-12 | End: 2024-02-14

## 2024-02-12 RX ORDER — MAGNESIUM SULFATE 500 MG/ML
2 VIAL (ML) INJECTION ONCE
Refills: 0 | Status: COMPLETED | OUTPATIENT
Start: 2024-02-12 | End: 2024-02-12

## 2024-02-12 RX ORDER — PIPERACILLIN AND TAZOBACTAM 4; .5 G/20ML; G/20ML
3.38 INJECTION, POWDER, LYOPHILIZED, FOR SOLUTION INTRAVENOUS EVERY 8 HOURS
Refills: 0 | Status: DISCONTINUED | OUTPATIENT
Start: 2024-02-12 | End: 2024-02-14

## 2024-02-12 RX ORDER — INSULIN GLARGINE 100 [IU]/ML
21 INJECTION, SOLUTION SUBCUTANEOUS EVERY MORNING
Refills: 0 | Status: DISCONTINUED | OUTPATIENT
Start: 2024-02-13 | End: 2024-02-14

## 2024-02-12 RX ADMIN — Medication 9 UNIT(S): at 18:19

## 2024-02-12 RX ADMIN — PIPERACILLIN AND TAZOBACTAM 25 GRAM(S): 4; .5 INJECTION, POWDER, LYOPHILIZED, FOR SOLUTION INTRAVENOUS at 02:10

## 2024-02-12 RX ADMIN — Medication 1: at 18:19

## 2024-02-12 RX ADMIN — Medication 5 MILLIGRAM(S): at 06:55

## 2024-02-12 RX ADMIN — ENOXAPARIN SODIUM 60 MILLIGRAM(S): 100 INJECTION SUBCUTANEOUS at 07:54

## 2024-02-12 RX ADMIN — INSULIN GLARGINE 18 UNIT(S): 100 INJECTION, SOLUTION SUBCUTANEOUS at 09:10

## 2024-02-12 RX ADMIN — Medication 2: at 11:42

## 2024-02-12 RX ADMIN — Medication 250 MILLIGRAM(S): at 09:09

## 2024-02-12 RX ADMIN — Medication 2: at 07:59

## 2024-02-12 RX ADMIN — Medication 8 UNIT(S): at 07:58

## 2024-02-12 RX ADMIN — TACROLIMUS 2 MILLIGRAM(S): 5 CAPSULE ORAL at 06:56

## 2024-02-12 RX ADMIN — Medication 25 GRAM(S): at 02:09

## 2024-02-12 RX ADMIN — Medication 40 MILLIEQUIVALENT(S): at 07:03

## 2024-02-12 RX ADMIN — CHLORHEXIDINE GLUCONATE 1 APPLICATION(S): 213 SOLUTION TOPICAL at 18:19

## 2024-02-12 RX ADMIN — MUPIROCIN 1 APPLICATION(S): 20 OINTMENT TOPICAL at 18:18

## 2024-02-12 RX ADMIN — ENOXAPARIN SODIUM 60 MILLIGRAM(S): 100 INJECTION SUBCUTANEOUS at 18:18

## 2024-02-12 RX ADMIN — Medication 8 UNIT(S): at 11:41

## 2024-02-12 RX ADMIN — PIPERACILLIN AND TAZOBACTAM 25 GRAM(S): 4; .5 INJECTION, POWDER, LYOPHILIZED, FOR SOLUTION INTRAVENOUS at 19:05

## 2024-02-12 RX ADMIN — PIPERACILLIN AND TAZOBACTAM 25 GRAM(S): 4; .5 INJECTION, POWDER, LYOPHILIZED, FOR SOLUTION INTRAVENOUS at 11:41

## 2024-02-12 RX ADMIN — TACROLIMUS 2 MILLIGRAM(S): 5 CAPSULE ORAL at 18:34

## 2024-02-12 NOTE — PROGRESS NOTE ADULT - SUBJECTIVE AND OBJECTIVE BOX
[facility] Division of Kidney Diseases & Hypertension  FOLLOW UP NOTE  [phone]--------------------------------------------------------------------------------  Chief Complaint:s/p renal transplant on immunosuppression     24 hour events/subjective: Pt seen and evaluated bedside this morning. Reports feeling well, endorses no complaints. Denies any headaches, nausea, vomiting, fevers/chills, chest pain, palpitations, SOB, abdominal pain, and leg swelling.    PAST HISTORY  --------------------------------------------------------------------------------  No significant changes to PMH, PSH, FHx, SHx, unless otherwise noted    ALLERGIES & MEDICATIONS  --------------------------------------------------------------------------------  Allergies  penicillin (Hives; Angioedema)  Intolerances    Standing Inpatient Medications  dextrose 5%. 1000 milliLiter(s) IV Continuous <Continuous>  dextrose 5%. 1000 milliLiter(s) IV Continuous <Continuous>  dextrose 50% Injectable 12.5 Gram(s) IV Push once  dextrose 50% Injectable 25 Gram(s) IV Push once  dextrose 50% Injectable 25 Gram(s) IV Push once  enoxaparin Injectable 60 milliGRAM(s) SubCutaneous every 12 hours  glucagon  Injectable 1 milliGRAM(s) IntraMuscular once  insulin lispro (ADMELOG) corrective regimen sliding scale   SubCutaneous at bedtime  insulin lispro (ADMELOG) corrective regimen sliding scale   SubCutaneous three times a day before meals  insulin lispro Injectable (ADMELOG) 9 Unit(s) SubCutaneous three times a day before meals  mupirocin 2% Ointment 1 Application(s) Topical every 12 hours  piperacillin/tazobactam IVPB.. 3.375 Gram(s) IV Intermittent every 8 hours  polyethylene glycol 3350 17 Gram(s) Oral daily  predniSONE   Tablet 5 milliGRAM(s) Oral daily  senna 2 Tablet(s) Oral at bedtime  tacrolimus 2 milliGRAM(s) Oral two times a day  vancomycin  IVPB 1000 milliGRAM(s) IV Intermittent every 12 hours    PRN Inpatient Medications  dextrose Oral Gel 15 Gram(s) Oral once PRN      REVIEW OF SYSTEMS  --------------------------------------------------------------------------------  Gen: no fever  Respiratory: no sob  CV: no cp  GI: no pain, no n/v  : no complaints  MSK: no pain    VITALS/PHYSICAL EXAM  --------------------------------------------------------------------------------  T(C): 36.8 (02-12-24 @ 12:00), Max: 36.9 (02-11-24 @ 20:00)  HR: 84 (02-12-24 @ 12:00) (63 - 86)  BP: 120/93 (02-12-24 @ 12:00) (101/86 - 140/99)  RR: 23 (02-12-24 @ 12:00) (8 - 23)  SpO2: 98% (02-12-24 @ 12:00) (97% - 100%)  Wt(kg): --    02-11-24 @ 07:01  -  02-12-24 @ 07:00  --------------------------------------------------------  IN: 1283.3 mL / OUT: 2400 mL / NET: -1116.7 mL    02-12-24 @ 07:01  -  02-12-24 @ 13:02  --------------------------------------------------------  IN: 490 mL / OUT: 300 mL / NET: 190 mL    Physical Exam:  	Gen: NAD  	HEENT: Anicteric  	Pulm: CTA B/L  	CV: S1S2+  	Abd: Soft, +BS                Transplant site: RLQ non tender, well healed surgical scar.  	Ext: No LE edema B/L  	Neuro: Awake  	Skin: Warm and dry  	Dialysis access: LUE AVF, aneurysmal +thrill    LABS/STUDIES  --------------------------------------------------------------------------------              13.0   4.17  >-----------<  141      [02-12-24 @ 04:45]              38.0     137  |  104  |  8   ----------------------------<  235      [02-12-24 @ 06:27]  3.4   |  23  |  0.73        Ca     9.5     [02-12-24 @ 06:27]      Mg     2.60     [02-12-24 @ 06:27]      Phos  2.9     [02-12-24 @ 06:27]    TPro  5.5  /  Alb  2.6  /  TBili  0.6  /  DBili  x   /  AST  107  /  ALT  150  /  AlkPhos  69  [02-12-24 @ 06:27]    PT/INR: PT 11.5 , INR 1.03       [02-10-24 @ 21:30]  PTT: 34.9       [02-12-24 @ 04:45]    Creatinine Trend:  SCr 0.73 [02-12 @ 06:27]  SCr 0.72 [02-12 @ 04:45]  SCr 0.76 [02-12 @ 00:00]  SCr 0.65 [02-11 @ 20:15]  SCr 0.70 [02-11 @ 16:13]    Lipid: chol 187, , HDL 24, LDL --      [02-10-24 @ 15:45]

## 2024-02-12 NOTE — PROGRESS NOTE ADULT - PROBLEM SELECTOR PLAN 2
Patient with RLE extensive DVT above and below knee  Unclear cause  - Lovenox 60 BID (full AC)  - will start DOAC, need to ensure covered by insurance  - f/u Hypercoagulable workup

## 2024-02-12 NOTE — PROGRESS NOTE ADULT - ASSESSMENT
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    Neuro  No active issues  Pain control, as needed    Card  HTN  Home Meds: Nifedipine 30, Coreg 12.5 BID, Lisinopril 5  - normotensive inpatient  - resume home meds once pt becomes hypertensive    Pulm  No active issues    GI/Hep  Diet: Restarted on diet   Bowel regimen since with rectal pain  - f/u surgery recs    /Renal  S/p renal transplant, not on HD.  Hydronephrosis on CT  Home Meds: Pred 5, Mycophenolate 500 BID, Tacrolimus 2 BID  - transplant renal recs appreciated  - monitor SCr,   - monitor I/Os  - hold cellcept   - C/w Tacro 2 BID  - F/u tacro levels prior to AM dose   - f/u renal US regarding hydronephrosis on CT A/P   - c/w prednisone 5mg qd    Heme/Onc  Patient with RLE extensive DVT above and below knee  Unclear cause  - S/P Hep gtt   - Lovenox 60 BID for now, eventual DOAC  - f/u Hypercoagulable workup    ID  Perianal abscess  s/p I&D By surgery  UCx: negative  MRSA swab negative  -f/u BCx  - wound culture in lab, gram positive cocci in pairs f/u sensitivity  - c/w vanc and zosyn  - f/u ID recs    Endocrine  DM with DKA: pH 7.23, AG 28, Bicarb 8, BHB 7.1  Likely exacerbated by infection, as patient reports good insulin adherence, though A1C is > 12.   Home med: Insulin 18U Nightly and 8U pre-meals.   - Off Insulin  - Increase Lantus 2/13 to 21 units   - Increase Ademlog to  9 units TID w/ meals   - f/u endocrine recs  - lipid panel    Ethics  Full Code

## 2024-02-12 NOTE — PROGRESS NOTE ADULT - PROBLEM SELECTOR PLAN 4
not on HD.  Hydronephrosis on CT  Home Meds: Pred 5, Mycophenolate 500 BID, Tacrolimus 2 BID  - transplant renal recs appreciated  - monitor SCr,   - monitor I/Os  - hold cellcept i/s/o infection  - C/w Tacro 2 BID  - F/u tacro levels prior to AM dose   - f/u renal US regarding hydronephrosis on CT A/P   - c/w prednisone 5mg qd

## 2024-02-12 NOTE — PROGRESS NOTE ADULT - SUBJECTIVE AND OBJECTIVE BOX
Subjective:    INTERVAL HPI/OVERNIGHT EVENTS:  - downgraded to floors from MICU for perianal abscess and DKA  - DKA resolved, now s/p bridging to basal-bolus insulin FS acceptable  - patient feeling improved, pain improved  - no CP, SOB.     T(F): 98.6 (02-12-24 @ 14:36), Max: 98.6 (02-12-24 @ 14:36)  HR: 77 (02-12-24 @ 14:36) (63 - 86)  BP: 104/75 (02-12-24 @ 14:36) (101/86 - 140/99)  RR: 17 (02-12-24 @ 14:36) (8 - 23)  SpO2: 99% (02-12-24 @ 14:36) (97% - 100%)  I&O's Summary    11 Feb 2024 07:01  -  12 Feb 2024 07:00  --------------------------------------------------------  IN: 1283.3 mL / OUT: 2400 mL / NET: -1116.7 mL    12 Feb 2024 07:01  -  12 Feb 2024 15:20  --------------------------------------------------------  IN: 490 mL / OUT: 300 mL / NET: 190 mL      Weight (kg): 62.959 (02-10-24 @ 12:50)    Medications:  dextrose 5%. 1000 milliLiter(s) (50 mL/Hr) IV Continuous <Continuous>  dextrose 5%. 1000 milliLiter(s) (100 mL/Hr) IV Continuous <Continuous>  dextrose 50% Injectable 12.5 Gram(s) IV Push once  dextrose 50% Injectable 25 Gram(s) IV Push once  dextrose 50% Injectable 25 Gram(s) IV Push once  dextrose Oral Gel 15 Gram(s) Oral once PRN  enoxaparin Injectable 60 milliGRAM(s) SubCutaneous every 12 hours  glucagon  Injectable 1 milliGRAM(s) IntraMuscular once  insulin lispro (ADMELOG) corrective regimen sliding scale   SubCutaneous three times a day before meals  insulin lispro (ADMELOG) corrective regimen sliding scale   SubCutaneous at bedtime  insulin lispro Injectable (ADMELOG) 9 Unit(s) SubCutaneous three times a day before meals  mupirocin 2% Ointment 1 Application(s) Topical every 12 hours  piperacillin/tazobactam IVPB.. 3.375 Gram(s) IV Intermittent every 8 hours  polyethylene glycol 3350 17 Gram(s) Oral daily  predniSONE   Tablet 5 milliGRAM(s) Oral daily  senna 2 Tablet(s) Oral at bedtime  tacrolimus 2 milliGRAM(s) Oral two times a day      PHYSICAL EXAM:  GENERAL: NAD, well-groomed, well-developed  HEAD:  Atraumatic, Normocephalic  EYES: EOMI, PERRLA, conjunctiva and sclera clear  ENMT:  Moist mucous membranes  NECK: No JVD  CHEST/LUNG: Clear to auscultation bilaterally; No rales, rhonchi, wheezing, or rubs  HEART: Regular rate and rhythm; No murmurs, rubs, or gallops  ABDOMEN: Soft, Nontender, Nondistended; Bowel sounds present  VASCULAR:  No clubbing, cyanosis, or edema  LYMPH: No lymphadenopathy noted  SKIN: No rashes or lesions  NERVOUS SYSTEM:  Alert & Oriented X3     Notable Labs:    Labs:                          13.0   4.17  )-----------( 141      ( 12 Feb 2024 04:45 )             38.0     02-12    137  |  104  |  8   ----------------------------<  235<H>  3.4<L>   |  23  |  0.73    Ca    9.5      12 Feb 2024 06:27  Phos  2.9     02-12  Mg     2.60     02-12    TPro  5.5<L>  /  Alb  2.6<L>  /  TBili  0.6  /  DBili  x   /  AST  107<H>  /  ALT  150<H>  /  AlkPhos  69  02-12    LIVER FUNCTIONS - ( 12 Feb 2024 06:27 )  Alb: 2.6 g/dL / Pro: 5.5 g/dL / ALK PHOS: 69 U/L / ALT: 150 U/L / AST: 107 U/L / GGT: x           PT/INR - ( 10 Feb 2024 21:30 )   PT: 11.5 sec;   INR: 1.03 ratio         PTT - ( 12 Feb 2024 04:45 )  PTT:34.9 sec  CAPILLARY BLOOD GLUCOSE      POCT Blood Glucose.: 246 mg/dL (12 Feb 2024 11:35)  POCT Blood Glucose.: 205 mg/dL (12 Feb 2024 07:57)  POCT Blood Glucose.: 196 mg/dL (12 Feb 2024 06:25)  POCT Blood Glucose.: 185 mg/dL (12 Feb 2024 04:52)  POCT Blood Glucose.: 181 mg/dL (12 Feb 2024 00:16)  POCT Blood Glucose.: 200 mg/dL (11 Feb 2024 21:22)  POCT Blood Glucose.: 236 mg/dL (11 Feb 2024 18:58)  POCT Blood Glucose.: 188 mg/dL (11 Feb 2024 16:07    Urinalysis Basic - ( 12 Feb 2024 06:27 )    Color: x / Appearance: x / SG: x / pH: x  Gluc: 235 mg/dL / Ketone: x  / Bili: x / Urobili: x   Blood: x / Protein: x / Nitrite: x   Leuk Esterase: x / RBC: x / WBC x   Sq Epi: x / Non Sq Epi: x / Bacteria: x        Micro:    Culture - Abscess with Gram Stain (collected 02-10-24 @ 11:45)  Source: .Abscess perianal abscess  Gram Stain (02-10-24 @ 18:12):    Few polymorphonuclear leukocytes per low power field    Few Gram positive cocci in pairs per oil power field  Preliminary Report (02-11-24 @ 15:45):    Numerous Staphylococcus aureus  Organism: Staphylococcus aureus (02-12-24 @ 10:18)  Organism: Staphylococcus aureus (02-12-24 @ 10:18)      Method Type: ROHIT      -  Ampicillin/Sulbactam: S <=8/4      -  Cefazolin: S <=4      -  Clindamycin: S 0.5      -  Erythromycin: S <=0.25      -  Gentamicin: S <=1 Should not be used as monotherapy      -  Oxacillin: S 0.5 Oxacillin predicts susceptibility for dicloxacillin, methicillin, and nafcillin      -  Penicillin: R >8      -  Rifampin: S <=1 Should not be used as monotherapy      -  Tetracycline: S <=1      -  Trimethoprim/Sulfamethoxazole: S <=0.5/9.5      -  Vancomycin: S 2    Culture - Urine (collected 02-10-24 @ 03:10)  Source: Clean Catch Clean Catch (Midstream)  Final Report (02-11-24 @ 11:34):    No growth        RADIOLOGY & ADDITIONAL TESTS:    EKG:      Echo:      Xray:      US/CT/MRI:

## 2024-02-12 NOTE — PROGRESS NOTE ADULT - ASSESSMENT
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

## 2024-02-12 NOTE — PROGRESS NOTE ADULT - ASSESSMENT
38M with HTN, hx ESRD on HD via LUE AVF for 13 years until renal transplant 3 years ago at [facility].  Post-transplant developed DM.  Now admitted 2/9 with rectal pain and found be in DKA.    WBC 4  Creatinine 0.73  UA (-)  UC (-)  CT with perianal 2.1 x 1.5 x 2.6 abscess cx +MSSA    Overall, 38M with renal transplant (on prednisone / cellcept) here with perirectal abscess in DKA with leukocytosis s/p I+D, also with DVT  - continue zosyn D#3  - stop vancomycin (MSSA)  - glycemic control  - trend wbc 38M with HTN, hx ESRD on HD via LUE AVF for 13 years until renal transplant 3 years ago at [facility].  Post-transplant developed DM.  Now admitted 2/9 with rectal pain and found be in DKA.    WBC 4  Creatinine 0.73  UA (-)  UC (-)  CT with perianal 2.1 x 1.5 x 2.6 abscess cx +MSSA    Overall, 38M with renal transplant (on prednisone / cellcept) here with perirectal abscess in DKA with leukocytosis s/p I+D, also with DVT  - continue zosyn D#3  - stop vancomycin (MSSA)  - glycemic control  - trend wbc  - surgery f/u  - hope to limit for 2 weeks    I will be away, returning 2/14/2024.  My associates to cover.  Please call ID as needed [phone].

## 2024-02-12 NOTE — PROGRESS NOTE ADULT - PROBLEM SELECTOR PLAN 2
Currently on immunosuppression (oral tacrolimus 2mg BID,  mg BID and prednisone 5 mg PO OD) for kidney transplant. Recommend continuing home Tacrolimus 2mg q12 and home Prednisone 5mg daily. Given perirectal abscess, would hold MMF for now. Tacro lvl 4.9 on 2/11. Pending lvl today. Check serum tacrolimus level (trough) 30 mins prior to am dose.

## 2024-02-12 NOTE — PROGRESS NOTE ADULT - SUBJECTIVE AND OBJECTIVE BOX
Dayday Sweet MD  Internal Medicine PGY-3      MICU PROGRESS NOTE     OVERNIGHT EVENTS:    SUBJECTIVE: Patient seen and examined at bedside. Patient denies fever, chills, SOB, chest pain, N/V/D.    OBJECTIVE:    VITAL SIGNS:  ICU Vital Signs Last 24 Hrs  T(C): 36.7 (12 Feb 2024 04:00), Max: 36.9 (11 Feb 2024 20:00)  T(F): 98 (12 Feb 2024 04:00), Max: 98.5 (11 Feb 2024 20:00)  HR: 69 (12 Feb 2024 07:00) (63 - 94)  BP: 133/98 (12 Feb 2024 07:00) (108/76 - 140/99)  BP(mean): 109 (12 Feb 2024 07:00) (87 - 115)  ABP: --  ABP(mean): --  RR: 10 (12 Feb 2024 07:00) (8 - 23)  SpO2: 98% (12 Feb 2024 07:00) (97% - 100%)    O2 Parameters below as of 12 Feb 2024 07:00  Patient On (Oxygen Delivery Method): room air              02-11 @ 07:01  -  02-12 @ 07:00  --------------------------------------------------------  IN: 1283.3 mL / OUT: 2400 mL / NET: -1116.7 mL        =================PHYSICAL EXAM=================    GENERAL: Laying comfortably, NAD  EYES: EOMI, PERRL, no scleral icterus  NECK: No JVD  LUNG: Clear to auscultation bilaterally; No wheeze, crackles or rhonci  HEART: Regular rate and rhythm; No murmurs, rubs, or gallops  ABDOMEN: Soft, Nontender, Nondistended  EXTREMITIES:  No LE edema, 2+ Peripheral Pulses, No clubbing, cyanosis, or edema  PSYCH: AAOx3  NEUROLOGY: non-focal, strength 5/5 in all extremities, sensation intact  SKIN: No rashes or lesions    =================================================    LABS:                        13.0   4.17  )-----------( 141      ( 12 Feb 2024 04:45 )             38.0     Auto Eosinophil # x     / Auto Eosinophil % x     / Auto Neutrophil # x     / Auto Neutrophil % x     / BANDS % x                            13.7   5.86  )-----------( 159      ( 12 Feb 2024 00:00 )             40.2     Auto Eosinophil # 0.09  / Auto Eosinophil % 1.5   / Auto Neutrophil # 3.41  / Auto Neutrophil % 58.2  / BANDS % x                            13.4   5.71  )-----------( 144      ( 11 Feb 2024 20:15 )             38.1     Auto Eosinophil # 0.06  / Auto Eosinophil % 1.1   / Auto Neutrophil # 3.63  / Auto Neutrophil % 63.5  / BANDS % x        02-12    137  |  104  |  8   ----------------------------<  235<H>  3.4<L>   |  23  |  0.73  02-12    128<L>  |  91<L>  |  8   ----------------------------<  534<HH>  3.6   |  21<L>  |  0.72  02-12    138  |  103  |  9   ----------------------------<  189<H>  4.5   |  25  |  0.76    Ca    9.5      12 Feb 2024 06:27  Mg     2.60     02-12  Phos  2.9     02-12  TPro  5.5<L>  /  Alb  2.6<L>  /  TBili  0.6  /  DBili  x   /  AST  107<H>  /  ALT  150<H>  /  AlkPhos  69  02-12  TPro  5.7<L>  /  Alb  2.5<L>  /  TBili  0.6  /  DBili  x   /  AST  97<H>  /  ALT  136<H>  /  AlkPhos  62  02-12  TPro  6.2  /  Alb  3.1<L>  /  TBili  0.6  /  DBili  x   /  AST  106<H>  /  ALT  157<H>  /  AlkPhos  76  02-12    PT/INR - ( 10 Feb 2024 21:30 )   PT: 11.5 sec;   INR: 1.03 ratio         PTT - ( 12 Feb 2024 04:45 )  PTT:34.9 sec      Urinalysis Basic - ( 12 Feb 2024 06:27 )    Color: x / Appearance: x / SG: x / pH: x  Gluc: 235 mg/dL / Ketone: x  / Bili: x / Urobili: x   Blood: x / Protein: x / Nitrite: x   Leuk Esterase: x / RBC: x / WBC x   Sq Epi: x / Non Sq Epi: x / Bacteria: x               MEDICATIONS:  MEDICATIONS  (STANDING):  dextrose 5%. 1000 milliLiter(s) (50 mL/Hr) IV Continuous <Continuous>  dextrose 5%. 1000 milliLiter(s) (100 mL/Hr) IV Continuous <Continuous>  dextrose 50% Injectable 25 Gram(s) IV Push once  dextrose 50% Injectable 12.5 Gram(s) IV Push once  dextrose 50% Injectable 25 Gram(s) IV Push once  dextrose 50% Injectable 25 Gram(s) IV Push once  enoxaparin Injectable 60 milliGRAM(s) SubCutaneous every 12 hours  glucagon  Injectable 1 milliGRAM(s) IntraMuscular once  insulin glargine Injectable (LANTUS) 22 Unit(s) SubCutaneous every morning  insulin lispro (ADMELOG) corrective regimen sliding scale   SubCutaneous at bedtime  insulin lispro (ADMELOG) corrective regimen sliding scale   SubCutaneous three times a day before meals  insulin lispro Injectable (ADMELOG) 8 Unit(s) SubCutaneous three times a day before meals  piperacillin/tazobactam IVPB.. 3.375 Gram(s) IV Intermittent every 8 hours  polyethylene glycol 3350 17 Gram(s) Oral daily  predniSONE   Tablet 5 milliGRAM(s) Oral daily  senna 2 Tablet(s) Oral at bedtime  tacrolimus 2 milliGRAM(s) Oral two times a day  vancomycin  IVPB 1000 milliGRAM(s) IV Intermittent every 12 hours    MEDICATIONS  (PRN):  dextrose Oral Gel 15 Gram(s) Oral once PRN Blood Glucose LESS THAN 70 milliGRAM(s)/deciliter      ALLERGIES:  Allergies    penicillin (Hives; Angioedema)    Intolerances        LABS:                        13.0   4.17  )-----------( 141      ( 12 Feb 2024 04:45 )             38.0     02-12    137  |  104  |  8   ----------------------------<  235<H>  3.4<L>   |  23  |  0.73    Ca    9.5      12 Feb 2024 06:27  Phos  2.9     02-12  Mg     2.60     02-12    TPro  5.5<L>  /  Alb  2.6<L>  /  TBili  0.6  /  DBili  x   /  AST  107<H>  /  ALT  150<H>  /  AlkPhos  69  02-12    PT/INR - ( 10 Feb 2024 21:30 )   PT: 11.5 sec;   INR: 1.03 ratio         PTT - ( 12 Feb 2024 04:45 )  PTT:34.9 sec  Urinalysis Basic - ( 12 Feb 2024 06:27 )    Color: x / Appearance: x / SG: x / pH: x  Gluc: 235 mg/dL / Ketone: x  / Bili: x / Urobili: x   Blood: x / Protein: x / Nitrite: x   Leuk Esterase: x / RBC: x / WBC x   Sq Epi: x / Non Sq Epi: x / Bacteria: x        CAPILLARY BLOOD GLUCOSE      POCT Blood Glucose.: 196 mg/dL (12 Feb 2024 06:25)      RADIOLOGY & ADDITIONAL TESTS: Reviewed.

## 2024-02-12 NOTE — PROGRESS NOTE ADULT - SUBJECTIVE AND OBJECTIVE BOX
Chief Complaint: Poorly controlled T2DM with hyperglycemia  DKA  History: Pt seen at bedside. Pt tolerating oral diet. Pt denies nausea and vomiting/any signs of hypoglycemia. Pt reports an adequate appetite.    MEDICATIONS  (STANDING):  dextrose 5%. 1000 milliLiter(s) (50 mL/Hr) IV Continuous <Continuous>  dextrose 5%. 1000 milliLiter(s) (100 mL/Hr) IV Continuous <Continuous>  dextrose 50% Injectable 12.5 Gram(s) IV Push once  dextrose 50% Injectable 25 Gram(s) IV Push once  dextrose 50% Injectable 25 Gram(s) IV Push once  enoxaparin Injectable 60 milliGRAM(s) SubCutaneous every 12 hours  glucagon  Injectable 1 milliGRAM(s) IntraMuscular once  insulin lispro (ADMELOG) corrective regimen sliding scale   SubCutaneous at bedtime  insulin lispro (ADMELOG) corrective regimen sliding scale   SubCutaneous three times a day before meals  insulin lispro Injectable (ADMELOG) 9 Unit(s) SubCutaneous three times a day before meals  mupirocin 2% Ointment 1 Application(s) Topical every 12 hours  piperacillin/tazobactam IVPB.. 3.375 Gram(s) IV Intermittent every 8 hours  polyethylene glycol 3350 17 Gram(s) Oral daily  predniSONE   Tablet 5 milliGRAM(s) Oral daily  senna 2 Tablet(s) Oral at bedtime  tacrolimus 2 milliGRAM(s) Oral two times a day    MEDICATIONS  (PRN):  dextrose Oral Gel 15 Gram(s) Oral once PRN Blood Glucose LESS THAN 70 milliGRAM(s)/deciliter      Allergies: penicillin (Hives; Angioedema)      Review of Systems:  Respiratory: No SOB, no cough  GI: No nausea, vomiting, abdominal pain  Endocrine: no polyuria, polydipsia    PHYSICAL EXAM:  VITALS: T(C): 37 (02-12-24 @ 14:36)  T(F): 98.6 (02-12-24 @ 14:36), Max: 98.6 (02-12-24 @ 14:36)  HR: 77 (02-12-24 @ 14:36) (63 - 86)  BP: 104/75 (02-12-24 @ 14:36) (101/86 - 140/99)  RR:  (8 - 23)  SpO2:  (97% - 100%)  Wt(kg): --  GENERAL: NAD, well-groomed, well-developed  RESPIRATORY: No labored breathing   GI: Soft, nontender, non distended  PSYCH: Alert and oriented x 3, normal affect, normal mood      CAPILLARY BLOOD GLUCOSE  POCT Blood Glucose.: 246 mg/dL (12 Feb 2024 11:35)  POCT Blood Glucose.: 205 mg/dL (12 Feb 2024 07:57)  POCT Blood Glucose.: 196 mg/dL (12 Feb 2024 06:25)  POCT Blood Glucose.: 185 mg/dL (12 Feb 2024 04:52)  POCT Blood Glucose.: 181 mg/dL (12 Feb 2024 00:16)  POCT Blood Glucose.: 200 mg/dL (11 Feb 2024 21:22)  POCT Blood Glucose.: 236 mg/dL (11 Feb 2024 18:58)  POCT Blood Glucose.: 188 mg/dL (11 Feb 2024 16:07)    A1C with Estimated Average Glucose (02.10.24 @ 03:30)    A1C with Estimated Average Glucose Result: 12.2   Estimated Average Glucose: 303      02-12    137  |  104  |  8   ----------------------------<  235<H>  3.4<L>   |  23  |  0.73    eGFR: 120    Ca    9.5      02-12  Mg     2.60     02-12  Phos  2.9     02-12    TPro  5.5<L>  /  Alb  2.6<L>  /  TBili  0.6  /  DBili  x   /  AST  107<H>  /  ALT  150<H>  /  AlkPhos  69  02-12      Diet, Regular:   Consistent Carbohydrate No Snacks (CSTCHO) (02-11-24 @ 05:15) [Active]

## 2024-02-12 NOTE — CHART NOTE - NSCHARTNOTEFT_GEN_A_CORE
MAR Accept Note  Transfer to: 8N  Accepting Attending Physician:  Dr. Serra  Assigned Room: 829 B    Patient seen and examined.   Labs and data reviewed.   No findings precluding transfer of service.       HPI/MICU COURSE:   Please refer to MICU transfer note for full details. 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 Patient reports has had rectal pain for 4 days or so, has not had bowel movement until day of admission for those 4 days due to pain. Denies fevers, chills, sick contacts. no nausea, vomiting diarrhea. Patient is on insulin at home, says his DM came after his transplant in 12/2019, has not had issues administering his insulin. He also has RLE pain for about the same time, no shortness of breath or chest pain. In ED afebrile, hemodynamically table, breathing well on room air. In ED patient was given 3L of IV fluids, was briefly started on insulin gtt for DKA. Received Zosyn. Colorectal performed Incision and Drainage of  perirectal abscess.     Patient continued on the insulin gtt until 2/11. He was then bridged to basal bolus yesterday morning. Endo was consulted. ID was consulted and recommended c/w zosyn and vanc. Patient is tolerating PO diet.        FOR FOLLOW-UP:  [ ] f/u endo, ID, surgery recs  [ ] f/u renal US  [ ] f/u Hypercoagulable workup  [ ] monitor FS and tacro level   [ ] f/u transplant nephro recs.        Raul Moreno MD  Internal Medicine, PGY-3

## 2024-02-12 NOTE — PROGRESS NOTE ADULT - ATTENDING COMMENTS
Patient with previous ESRD secondary to FSGS, renal transplant 2020, now admitted with rectal abscess complicated by DKA.   1. Renal transplant recipient - monitor serial renal function levels.  2. Immunosuppression - medication regimen recommendations as noted above. meds verified with pharmacy [phone]. keep off CellCept   3. Metabolic acidosis - improving   4. Hypophosphatemia - improving  I called his nephrologist to discuss Alton Villaseñor - Dr. Cipriano Kaminski   Nephrologist  [phone]  awaiting call back

## 2024-02-12 NOTE — CHART NOTE - [id number]_GEN_A_CORE
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 Patient reports has had rectal pain for 4 days or so, has not had bowel movement until day of admission for those 4 days due to pain. Denies fevers, chills, sick contacts. no nausea, vomiting diarrhea. Patient is on insulin at home, says his DM came after his transplant in 12/2019, has not had issues administering his insulin. He also has RLE pain for about the same time, no shortness of breath or chest pain.  In ED afebrile, hemodynamically table, breathing well on room air. In ED patient was given 3L of IV fluids, was briefly started on insulin gtt for DKA. Received Zosyn. Colorectal performed Incision and Drainage of  perirectal abscess.         MICU COURSE  Patient continued on the insulin gtt until 2/11. He was then bridged to basal bolus yesterday morning. Endo was consulted. ID was consulted and recommended c/w zosyn and vanc. Patient is tolerating PO diet.        OBJECTIVE --  Vital Signs Last 24 Hrs  T(C): 36.7 (12 Feb 2024 08:00), Max: 36.9 (11 Feb 2024 20:00)  T(F): 98.1 (12 Feb 2024 08:00), Max: 98.5 (11 Feb 2024 20:00)  HR: 84 (12 Feb 2024 10:00) (63 - 94)  BP: 108/86 (12 Feb 2024 10:00) (101/86 - 140/99)  BP(mean): 93 (12 Feb 2024 10:00) (87 - 115)  RR: 17 (12 Feb 2024 10:00) (8 - 23)  SpO2: 97% (12 Feb 2024 10:00) (97% - 100%)    Parameters below as of 12 Feb 2024 10:00  Patient On (Oxygen Delivery Method): room air        I&O's Summary    11 Feb 2024 07:01  -  12 Feb 2024 07:00  --------------------------------------------------------  IN: 1283.3 mL / OUT: 2400 mL / NET: -1116.7 mL    12 Feb 2024 07:01  -  12 Feb 2024 11:33  --------------------------------------------------------  IN: 490 mL / OUT: 300 mL / NET: 190 mL        MEDICATIONS  (STANDING):  dextrose 5%. 1000 milliLiter(s) (50 mL/Hr) IV Continuous <Continuous>  dextrose 5%. 1000 milliLiter(s) (100 mL/Hr) IV Continuous <Continuous>  dextrose 50% Injectable 25 Gram(s) IV Push once  dextrose 50% Injectable 12.5 Gram(s) IV Push once  dextrose 50% Injectable 25 Gram(s) IV Push once  dextrose 50% Injectable 25 Gram(s) IV Push once  enoxaparin Injectable 60 milliGRAM(s) SubCutaneous every 12 hours  glucagon  Injectable 1 milliGRAM(s) IntraMuscular once  insulin glargine Injectable (LANTUS) 18 Unit(s) SubCutaneous every morning  insulin lispro (ADMELOG) corrective regimen sliding scale   SubCutaneous at bedtime  insulin lispro (ADMELOG) corrective regimen sliding scale   SubCutaneous three times a day before meals  insulin lispro Injectable (ADMELOG) 8 Unit(s) SubCutaneous three times a day before meals  piperacillin/tazobactam IVPB.. 3.375 Gram(s) IV Intermittent every 8 hours  polyethylene glycol 3350 17 Gram(s) Oral daily  predniSONE   Tablet 5 milliGRAM(s) Oral daily  senna 2 Tablet(s) Oral at bedtime  tacrolimus 2 milliGRAM(s) Oral two times a day  vancomycin  IVPB 1000 milliGRAM(s) IV Intermittent every 12 hours    MEDICATIONS  (PRN):  dextrose Oral Gel 15 Gram(s) Oral once PRN Blood Glucose LESS THAN 70 milliGRAM(s)/deciliter        LABS                                            13.0                  Neurophils% (auto):   x      (02-12 @ 04:45):    4.17 )-----------(141          Lymphocytes% (auto):  x                                             38.0                   Eosinphils% (auto):   x        Manual%: Neutrophils x    ; Lymphocytes x    ; Eosinophils x    ; Bands%: x    ; Blasts x                                    137    |  104    |  8                   Calcium: 9.5   / iCa: x      (02-12 @ 06:27)    ----------------------------<  235       Magnesium: 2.60                             3.4     |  23     |  0.73             Phosphorous: 2.9      TPro  5.5    /  Alb  2.6    /  TBili  0.6    /  DBili  x      /  AST  107    /  ALT  150    /  AlkPhos  69     12 Feb 2024 06:27    ( 02-12 @ 04:45 )   PT: x    ;   INR: x      aPTT: 34.9 sec          ASSESSMENT & PLAN:   · Assessment	  38M w DM on insulin, 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    Neuro  No active issues  Pain control, as needed    Card  HTN  Home Meds: Nifedipine 30, Coreg 12.5 BID, Lisinopril 5  - normotensive inpatient  - resume home meds once pt becomes hypertensive    Pulm  No active issues    GI/Hep  Diet: Restarted on diet   Bowel regimen since with rectal pain  - surgery recs    /Renal  S/p renal transplant, not on HD.  Hydronephrosis on CT  Home Meds: Pred 5, Mycophenolate 500 BID, Tacrolimu 2 BID  - transplant renal consulted, f/u recs  - monitor SCr,   - monitor I/Os  - hold cellcept   - C/w Tacro   - F/u tacro levels.  - f/u renal regarding hydronephrosis  - c/w prednisone 5mg qd    Heme/Onc  Patient with RLE extensive DVT above and below knee  Unclear cause  - Hep gtt for now, eventual DOAC  - Hypercoagulable workup    ID  Perianal abscess  s/p I&D By surgery  - wound culture in lab, f/u results  - BCx x2, UCx in lab  - vanc and zosyn  - MRSA swab  - ID consulted, f/u recs    Endocrine  DM with DKA: pH 7.23, AG 28, Bicarb 8, BHB 7.1  Likely exacerbated by infection, as patient reports good insulin adherence, though A1C is > 12.   Home med: Insulin 18U Nightly and 8U pre-meals.   - Off Insulin  - Restarted on Lantus 18 units daily in am.   - C/w Ademlog 6 units TID w/ meals - Endo recommended 6 units.  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 Patient reports has had rectal pain for 4 days or so, has not had bowel movement until day of admission for those 4 days due to pain. Denies fevers, chills, sick contacts. no nausea, vomiting diarrhea. Patient is on insulin at home, says his DM came after his transplant in 12/2019, has not had issues administering his insulin. He also has RLE pain for about the same time, no shortness of breath or chest pain.  In ED afebrile, hemodynamically table, breathing well on room air. In ED patient was given 3L of IV fluids, was briefly started on insulin gtt for DKA. Received Zosyn. Colorectal performed Incision and Drainage of  perirectal abscess.         MICU COURSE  Patient continued on the insulin gtt until 2/11. He was then bridged to basal bolus yesterday morning. Endo was consulted. ID was consulted and recommended c/w zosyn and vanc. Patient is tolerating PO diet.        OBJECTIVE --  Vital Signs Last 24 Hrs  T(C): 36.7 (12 Feb 2024 08:00), Max: 36.9 (11 Feb 2024 20:00)  T(F): 98.1 (12 Feb 2024 08:00), Max: 98.5 (11 Feb 2024 20:00)  HR: 84 (12 Feb 2024 10:00) (63 - 94)  BP: 108/86 (12 Feb 2024 10:00) (101/86 - 140/99)  BP(mean): 93 (12 Feb 2024 10:00) (87 - 115)  RR: 17 (12 Feb 2024 10:00) (8 - 23)  SpO2: 97% (12 Feb 2024 10:00) (97% - 100%)    Parameters below as of 12 Feb 2024 10:00  Patient On (Oxygen Delivery Method): room air        I&O's Summary    11 Feb 2024 07:01  -  12 Feb 2024 07:00  --------------------------------------------------------  IN: 1283.3 mL / OUT: 2400 mL / NET: -1116.7 mL    12 Feb 2024 07:01  -  12 Feb 2024 11:33  --------------------------------------------------------  IN: 490 mL / OUT: 300 mL / NET: 190 mL        MEDICATIONS  (STANDING):  dextrose 5%. 1000 milliLiter(s) (50 mL/Hr) IV Continuous <Continuous>  dextrose 5%. 1000 milliLiter(s) (100 mL/Hr) IV Continuous <Continuous>  dextrose 50% Injectable 25 Gram(s) IV Push once  dextrose 50% Injectable 12.5 Gram(s) IV Push once  dextrose 50% Injectable 25 Gram(s) IV Push once  dextrose 50% Injectable 25 Gram(s) IV Push once  enoxaparin Injectable 60 milliGRAM(s) SubCutaneous every 12 hours  glucagon  Injectable 1 milliGRAM(s) IntraMuscular once  insulin glargine Injectable (LANTUS) 18 Unit(s) SubCutaneous every morning  insulin lispro (ADMELOG) corrective regimen sliding scale   SubCutaneous at bedtime  insulin lispro (ADMELOG) corrective regimen sliding scale   SubCutaneous three times a day before meals  insulin lispro Injectable (ADMELOG) 8 Unit(s) SubCutaneous three times a day before meals  piperacillin/tazobactam IVPB.. 3.375 Gram(s) IV Intermittent every 8 hours  polyethylene glycol 3350 17 Gram(s) Oral daily  predniSONE   Tablet 5 milliGRAM(s) Oral daily  senna 2 Tablet(s) Oral at bedtime  tacrolimus 2 milliGRAM(s) Oral two times a day  vancomycin  IVPB 1000 milliGRAM(s) IV Intermittent every 12 hours    MEDICATIONS  (PRN):  dextrose Oral Gel 15 Gram(s) Oral once PRN Blood Glucose LESS THAN 70 milliGRAM(s)/deciliter        LABS                                            13.0                  Neurophils% (auto):   x      (02-12 @ 04:45):    4.17 )-----------(141          Lymphocytes% (auto):  x                                             38.0                   Eosinphils% (auto):   x        Manual%: Neutrophils x    ; Lymphocytes x    ; Eosinophils x    ; Bands%: x    ; Blasts x                                    137    |  104    |  8                   Calcium: 9.5   / iCa: x      (02-12 @ 06:27)    ----------------------------<  235       Magnesium: 2.60                             3.4     |  23     |  0.73             Phosphorous: 2.9      TPro  5.5    /  Alb  2.6    /  TBili  0.6    /  DBili  x      /  AST  107    /  ALT  150    /  AlkPhos  69     12 Feb 2024 06:27    ( 02-12 @ 04:45 )   PT: x    ;   INR: x      aPTT: 34.9 sec          ASSESSMENT & PLAN:   · Assessment	  38M w DM on insulin, 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    Neuro  No active issues  Pain control, as needed    Card  HTN  Home Meds: Nifedipine 30, Coreg 12.5 BID, Lisinopril 5  - normotensive inpatient  - resume home meds once pt becomes hypertensive    Pulm  No active issues    GI/Hep  Diet: Restarted on diet   Bowel regimen since with rectal pain  - f/u surgery recs    /Renal  S/p renal transplant, not on HD.  Hydronephrosis on CT  Home Meds: Pred 5, Mycophenolate 500 BID, Tacrolimus 2 BID  - transplant renal recs appreciated  - monitor SCr,   - monitor I/Os  - hold cellcept   - C/w Tacro   - F/u tacro levels prior to AM dose   - f/u renal regarding hydronephrosis  - c/w prednisone 5mg qd    Heme/Onc  Patient with RLE extensive DVT above and below knee  Unclear cause  - Hep gtt for now, eventual DOAC  - Hypercoagulable workup    ID  Perianal abscess  s/p I&D By surgery  - wound culture in lab, f/u results  - BCx x2, UCx in lab  - vanc and zosyn  - MRSA swab  - ID consulted, f/u recs    Endocrine  DM with DKA: pH 7.23, AG 28, Bicarb 8, BHB 7.1  Likely exacerbated by infection, as patient reports good insulin adherence, though A1C is > 12.   Home med: Insulin 18U Nightly and 8U pre-meals.   - Off Insulin  - Restarted on Lantus 18 units daily in am.   - C/w Ademlog 6 units TID w/ meals - Endo recommended 6 units.  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 Patient reports has had rectal pain for 4 days or so, has not had bowel movement until day of admission for those 4 days due to pain. Denies fevers, chills, sick contacts. no nausea, vomiting diarrhea. Patient is on insulin at home, says his DM came after his transplant in 12/2019, has not had issues administering his insulin. He also has RLE pain for about the same time, no shortness of breath or chest pain.  In ED afebrile, hemodynamically table, breathing well on room air. In ED patient was given 3L of IV fluids, was briefly started on insulin gtt for DKA. Received Zosyn. Colorectal performed Incision and Drainage of  perirectal abscess.         MICU COURSE  Patient continued on the insulin gtt until 2/11. He was then bridged to basal bolus yesterday morning. Endo was consulted. ID was consulted and recommended c/w zosyn and vanc. Patient is tolerating PO diet.        OBJECTIVE --  Vital Signs Last 24 Hrs  T(C): 36.7 (12 Feb 2024 08:00), Max: 36.9 (11 Feb 2024 20:00)  T(F): 98.1 (12 Feb 2024 08:00), Max: 98.5 (11 Feb 2024 20:00)  HR: 84 (12 Feb 2024 10:00) (63 - 94)  BP: 108/86 (12 Feb 2024 10:00) (101/86 - 140/99)  BP(mean): 93 (12 Feb 2024 10:00) (87 - 115)  RR: 17 (12 Feb 2024 10:00) (8 - 23)  SpO2: 97% (12 Feb 2024 10:00) (97% - 100%)    Parameters below as of 12 Feb 2024 10:00  Patient On (Oxygen Delivery Method): room air        I&O's Summary    11 Feb 2024 07:01  -  12 Feb 2024 07:00  --------------------------------------------------------  IN: 1283.3 mL / OUT: 2400 mL / NET: -1116.7 mL    12 Feb 2024 07:01  -  12 Feb 2024 11:33  --------------------------------------------------------  IN: 490 mL / OUT: 300 mL / NET: 190 mL        MEDICATIONS  (STANDING):  dextrose 5%. 1000 milliLiter(s) (50 mL/Hr) IV Continuous <Continuous>  dextrose 5%. 1000 milliLiter(s) (100 mL/Hr) IV Continuous <Continuous>  dextrose 50% Injectable 25 Gram(s) IV Push once  dextrose 50% Injectable 12.5 Gram(s) IV Push once  dextrose 50% Injectable 25 Gram(s) IV Push once  dextrose 50% Injectable 25 Gram(s) IV Push once  enoxaparin Injectable 60 milliGRAM(s) SubCutaneous every 12 hours  glucagon  Injectable 1 milliGRAM(s) IntraMuscular once  insulin glargine Injectable (LANTUS) 18 Unit(s) SubCutaneous every morning  insulin lispro (ADMELOG) corrective regimen sliding scale   SubCutaneous at bedtime  insulin lispro (ADMELOG) corrective regimen sliding scale   SubCutaneous three times a day before meals  insulin lispro Injectable (ADMELOG) 8 Unit(s) SubCutaneous three times a day before meals  piperacillin/tazobactam IVPB.. 3.375 Gram(s) IV Intermittent every 8 hours  polyethylene glycol 3350 17 Gram(s) Oral daily  predniSONE   Tablet 5 milliGRAM(s) Oral daily  senna 2 Tablet(s) Oral at bedtime  tacrolimus 2 milliGRAM(s) Oral two times a day  vancomycin  IVPB 1000 milliGRAM(s) IV Intermittent every 12 hours    MEDICATIONS  (PRN):  dextrose Oral Gel 15 Gram(s) Oral once PRN Blood Glucose LESS THAN 70 milliGRAM(s)/deciliter        LABS                                            13.0                  Neurophils% (auto):   x      (02-12 @ 04:45):    4.17 )-----------(141          Lymphocytes% (auto):  x                                             38.0                   Eosinphils% (auto):   x        Manual%: Neutrophils x    ; Lymphocytes x    ; Eosinophils x    ; Bands%: x    ; Blasts x                                    137    |  104    |  8                   Calcium: 9.5   / iCa: x      (02-12 @ 06:27)    ----------------------------<  235       Magnesium: 2.60                             3.4     |  23     |  0.73             Phosphorous: 2.9      TPro  5.5    /  Alb  2.6    /  TBili  0.6    /  DBili  x      /  AST  107    /  ALT  150    /  AlkPhos  69     12 Feb 2024 06:27    ( 02-12 @ 04:45 )   PT: x    ;   INR: x      aPTT: 34.9 sec          ASSESSMENT & PLAN:   · Assessment	  38M w DM on insulin, 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    Neuro  No active issues  Pain control, as needed    Card  HTN  Home Meds: Nifedipine 30, Coreg 12.5 BID, Lisinopril 5  - normotensive inpatient  - resume home meds once pt becomes hypertensive    Pulm  No active issues    GI/Hep  Diet: Restarted on diet   Bowel regimen since with rectal pain  - f/u surgery recs    /Renal  S/p renal transplant, not on HD.  Hydronephrosis on CT  Home Meds: Pred 5, Mycophenolate 500 BID, Tacrolimus 2 BID  - transplant renal recs appreciated  - monitor SCr,   - monitor I/Os  - hold cellcept   - C/w Tacro 2 BID  - F/u tacro levels prior to AM dose   - f/u renal US regarding hydronephrosis on CT A/P   - c/w prednisone 5mg qd    Heme/Onc  Patient with RLE extensive DVT above and below knee  Unclear cause  - S/P Hep gtt   - Lovenox 60 BID for now, eventual DOAC  - f/u Hypercoagulable workup    ID  Perianal abscess  s/p I&D By surgery  UCx: negative  MRSA swab negative  -f/u BCx  - wound culture in lab, gram positive cocci in pairs f/u sensitivity  - c/w vanc and zosyn  - f/u ID recs    Endocrine  DM with DKA: pH 7.23, AG 28, Bicarb 8, BHB 7.1  Likely exacerbated by infection, as patient reports good insulin adherence, though A1C is > 12.   Home med: Insulin 18U Nightly and 8U pre-meals.   - Off Insulin  - Increase Lantus 2/13 to 21 units   - Increase Ademlog to  9 units TID w/ meals   - f/u endocrine recs  - lipid panel    Ethics  Full Code        For Follow-Up:  [ ]   [ ] MICU Transfer Note  ---------------------------    Transfer from: MICU  Transfer to:  (x  ) Medicine    (  ) Telemetry    (  ) RCU    (  ) Palliative    (  ) Stroke Unit    (  ) _______________  Accepting Physician:          38M w DM on insulin, ESRD (from FSGS) s/p renal transplant in 2019, presents with rectal pain. Patient reports has had rectal pain for 4 days or so, has not had bowel movement until day of admission for those 4 days due to pain. Denies fevers, chills, sick contacts. no nausea, vomiting diarrhea. Patient is on insulin at home, says his DM came after his transplant in 12/2019, has not had issues administering his insulin. He also has RLE pain for about the same time, no shortness of breath or chest pain.  In ED afebrile, hemodynamically table, breathing well on room air. In ED patient was given 3L of IV fluids, was briefly started on insulin gtt for DKA. Received Zosyn. Colorectal performed Incision and Drainage of  perirectal abscess.         MICU COURSE  Patient continued on the insulin gtt until 2/11. He was then bridged to basal bolus yesterday morning. Endo was consulted. ID was consulted and recommended c/w zosyn and vanc. Patient is tolerating PO diet.        OBJECTIVE --  Vital Signs Last 24 Hrs  T(C): 36.7 (12 Feb 2024 08:00), Max: 36.9 (11 Feb 2024 20:00)  T(F): 98.1 (12 Feb 2024 08:00), Max: 98.5 (11 Feb 2024 20:00)  HR: 84 (12 Feb 2024 10:00) (63 - 94)  BP: 108/86 (12 Feb 2024 10:00) (101/86 - 140/99)  BP(mean): 93 (12 Feb 2024 10:00) (87 - 115)  RR: 17 (12 Feb 2024 10:00) (8 - 23)  SpO2: 97% (12 Feb 2024 10:00) (97% - 100%)    Parameters below as of 12 Feb 2024 10:00  Patient On (Oxygen Delivery Method): room air        I&O's Summary    11 Feb 2024 07:01  -  12 Feb 2024 07:00  --------------------------------------------------------  IN: 1283.3 mL / OUT: 2400 mL / NET: -1116.7 mL    12 Feb 2024 07:01  -  12 Feb 2024 11:33  --------------------------------------------------------  IN: 490 mL / OUT: 300 mL / NET: 190 mL        MEDICATIONS  (STANDING):  dextrose 5%. 1000 milliLiter(s) (50 mL/Hr) IV Continuous <Continuous>  dextrose 5%. 1000 milliLiter(s) (100 mL/Hr) IV Continuous <Continuous>  dextrose 50% Injectable 25 Gram(s) IV Push once  dextrose 50% Injectable 12.5 Gram(s) IV Push once  dextrose 50% Injectable 25 Gram(s) IV Push once  dextrose 50% Injectable 25 Gram(s) IV Push once  enoxaparin Injectable 60 milliGRAM(s) SubCutaneous every 12 hours  glucagon  Injectable 1 milliGRAM(s) IntraMuscular once  insulin glargine Injectable (LANTUS) 18 Unit(s) SubCutaneous every morning  insulin lispro (ADMELOG) corrective regimen sliding scale   SubCutaneous at bedtime  insulin lispro (ADMELOG) corrective regimen sliding scale   SubCutaneous three times a day before meals  insulin lispro Injectable (ADMELOG) 8 Unit(s) SubCutaneous three times a day before meals  piperacillin/tazobactam IVPB.. 3.375 Gram(s) IV Intermittent every 8 hours  polyethylene glycol 3350 17 Gram(s) Oral daily  predniSONE   Tablet 5 milliGRAM(s) Oral daily  senna 2 Tablet(s) Oral at bedtime  tacrolimus 2 milliGRAM(s) Oral two times a day  vancomycin  IVPB 1000 milliGRAM(s) IV Intermittent every 12 hours    MEDICATIONS  (PRN):  dextrose Oral Gel 15 Gram(s) Oral once PRN Blood Glucose LESS THAN 70 milliGRAM(s)/deciliter        LABS                                            13.0                  Neurophils% (auto):   x      (02-12 @ 04:45):    4.17 )-----------(141          Lymphocytes% (auto):  x                                             38.0                   Eosinphils% (auto):   x        Manual%: Neutrophils x    ; Lymphocytes x    ; Eosinophils x    ; Bands%: x    ; Blasts x                                    137    |  104    |  8                   Calcium: 9.5   / iCa: x      (02-12 @ 06:27)    ----------------------------<  235       Magnesium: 2.60                             3.4     |  23     |  0.73             Phosphorous: 2.9      TPro  5.5    /  Alb  2.6    /  TBili  0.6    /  DBili  x      /  AST  107    /  ALT  150    /  AlkPhos  69     12 Feb 2024 06:27    ( 02-12 @ 04:45 )   PT: x    ;   INR: x      aPTT: 34.9 sec          ASSESSMENT & PLAN:   · Assessment	  38M w DM on insulin, 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    Neuro  No active issues  Pain control, as needed    Card  HTN  Home Meds: Nifedipine 30, Coreg 12.5 BID, Lisinopril 5  - normotensive inpatient  - resume home meds once pt becomes hypertensive    Pulm  No active issues    GI/Hep  Diet: Restarted on diet   Bowel regimen since with rectal pain  - f/u surgery recs    /Renal  S/p renal transplant, not on HD.  Hydronephrosis on CT  Home Meds: Pred 5, Mycophenolate 500 BID, Tacrolimus 2 BID  - transplant renal recs appreciated  - monitor SCr,   - monitor I/Os  - hold cellcept   - C/w Tacro 2 BID  - F/u tacro levels prior to AM dose   - f/u renal US regarding hydronephrosis on CT A/P   - c/w prednisone 5mg qd    Heme/Onc  Patient with RLE extensive DVT above and below knee  Unclear cause  - S/P Hep gtt   - Lovenox 60 BID for now, eventual DOAC  - f/u Hypercoagulable workup    ID  Perianal abscess  s/p I&D By surgery  UCx: negative  MRSA swab negative  -f/u BCx  - wound culture in lab, gram positive cocci in pairs f/u sensitivity  - c/w vanc and zosyn  - f/u ID recs    Endocrine  DM with DKA: pH 7.23, AG 28, Bicarb 8, BHB 7.1  Likely exacerbated by infection, as patient reports good insulin adherence, though A1C is > 12.   Home med: Insulin 18U Nightly and 8U pre-meals.   - Off Insulin  - Increase Lantus 2/13 to 21 units   - Increase Ademlog to  9 units TID w/ meals   - f/u endocrine recs  - lipid panel    Ethics  Full Code        For Follow-Up:  [ ] f/u endo recs  [ ] f/u ID recs  [ ] f/u surgery recs  [ ] monitor FS MICU Transfer Note  ---------------------------    Transfer from: MICU  Transfer to:  (x  ) Medicine    (  ) Telemetry    (  ) RCU    (  ) Palliative    (  ) Stroke Unit    (  ) _______________  Accepting Physician:          38M w DM on insulin, ESRD (from FSGS) s/p renal transplant in 2019, presents with rectal pain. Patient reports has had rectal pain for 4 days or so, has not had bowel movement until day of admission for those 4 days due to pain. Denies fevers, chills, sick contacts. no nausea, vomiting diarrhea. Patient is on insulin at home, says his DM came after his transplant in 12/2019, has not had issues administering his insulin. He also has RLE pain for about the same time, no shortness of breath or chest pain.  In ED afebrile, hemodynamically table, breathing well on room air. In ED patient was given 3L of IV fluids, was briefly started on insulin gtt for DKA. Received Zosyn. Colorectal performed Incision and Drainage of  perirectal abscess.         MICU COURSE  Patient continued on the insulin gtt until 2/11. He was then bridged to basal bolus yesterday morning. Endo was consulted. ID was consulted and recommended c/w zosyn and vanc. Patient is tolerating PO diet.        OBJECTIVE --  Vital Signs Last 24 Hrs  T(C): 36.7 (12 Feb 2024 08:00), Max: 36.9 (11 Feb 2024 20:00)  T(F): 98.1 (12 Feb 2024 08:00), Max: 98.5 (11 Feb 2024 20:00)  HR: 84 (12 Feb 2024 10:00) (63 - 94)  BP: 108/86 (12 Feb 2024 10:00) (101/86 - 140/99)  BP(mean): 93 (12 Feb 2024 10:00) (87 - 115)  RR: 17 (12 Feb 2024 10:00) (8 - 23)  SpO2: 97% (12 Feb 2024 10:00) (97% - 100%)    Parameters below as of 12 Feb 2024 10:00  Patient On (Oxygen Delivery Method): room air        I&O's Summary    11 Feb 2024 07:01  -  12 Feb 2024 07:00  --------------------------------------------------------  IN: 1283.3 mL / OUT: 2400 mL / NET: -1116.7 mL    12 Feb 2024 07:01  -  12 Feb 2024 11:33  --------------------------------------------------------  IN: 490 mL / OUT: 300 mL / NET: 190 mL        MEDICATIONS  (STANDING):  dextrose 5%. 1000 milliLiter(s) (50 mL/Hr) IV Continuous <Continuous>  dextrose 5%. 1000 milliLiter(s) (100 mL/Hr) IV Continuous <Continuous>  dextrose 50% Injectable 25 Gram(s) IV Push once  dextrose 50% Injectable 12.5 Gram(s) IV Push once  dextrose 50% Injectable 25 Gram(s) IV Push once  dextrose 50% Injectable 25 Gram(s) IV Push once  enoxaparin Injectable 60 milliGRAM(s) SubCutaneous every 12 hours  glucagon  Injectable 1 milliGRAM(s) IntraMuscular once  insulin glargine Injectable (LANTUS) 18 Unit(s) SubCutaneous every morning  insulin lispro (ADMELOG) corrective regimen sliding scale   SubCutaneous at bedtime  insulin lispro (ADMELOG) corrective regimen sliding scale   SubCutaneous three times a day before meals  insulin lispro Injectable (ADMELOG) 8 Unit(s) SubCutaneous three times a day before meals  piperacillin/tazobactam IVPB.. 3.375 Gram(s) IV Intermittent every 8 hours  polyethylene glycol 3350 17 Gram(s) Oral daily  predniSONE   Tablet 5 milliGRAM(s) Oral daily  senna 2 Tablet(s) Oral at bedtime  tacrolimus 2 milliGRAM(s) Oral two times a day  vancomycin  IVPB 1000 milliGRAM(s) IV Intermittent every 12 hours    MEDICATIONS  (PRN):  dextrose Oral Gel 15 Gram(s) Oral once PRN Blood Glucose LESS THAN 70 milliGRAM(s)/deciliter        LABS                                            13.0                  Neurophils% (auto):   x      (02-12 @ 04:45):    4.17 )-----------(141          Lymphocytes% (auto):  x                                             38.0                   Eosinphils% (auto):   x        Manual%: Neutrophils x    ; Lymphocytes x    ; Eosinophils x    ; Bands%: x    ; Blasts x                                    137    |  104    |  8                   Calcium: 9.5   / iCa: x      (02-12 @ 06:27)    ----------------------------<  235       Magnesium: 2.60                             3.4     |  23     |  0.73             Phosphorous: 2.9      TPro  5.5    /  Alb  2.6    /  TBili  0.6    /  DBili  x      /  AST  107    /  ALT  150    /  AlkPhos  69     12 Feb 2024 06:27    ( 02-12 @ 04:45 )   PT: x    ;   INR: x      aPTT: 34.9 sec          ASSESSMENT & PLAN:   · Assessment	  38M w DM on insulin, 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    Neuro  No active issues  Pain control, as needed    Card  HTN  Home Meds: Nifedipine 30, Coreg 12.5 BID, Lisinopril 5  - normotensive inpatient  - resume home meds once pt becomes hypertensive    Pulm  No active issues    GI/Hep  Diet: Restarted on diet   Bowel regimen since with rectal pain  - f/u surgery recs    /Renal  S/p renal transplant, not on HD.  Hydronephrosis on CT  Home Meds: Pred 5, Mycophenolate 500 BID, Tacrolimus 2 BID  - transplant renal recs appreciated  - monitor SCr,   - monitor I/Os  - hold cellcept   - C/w Tacro 2 BID  - F/u tacro levels prior to AM dose   - f/u renal US regarding hydronephrosis on CT A/P   - c/w prednisone 5mg qd    Heme/Onc  Patient with RLE extensive DVT above and below knee  Unclear cause  - S/P Hep gtt   - Lovenox 60 BID for now, eventual DOAC  - f/u Hypercoagulable workup    ID  Perianal abscess  s/p I&D By surgery  UCx: negative  MRSA swab negative  -f/u BCx  - wound culture in lab, gram positive cocci in pairs f/u sensitivity  - c/w vanc and zosyn  - f/u ID recs    Endocrine  DM with DKA: pH 7.23, AG 28, Bicarb 8, BHB 7.1  Likely exacerbated by infection, as patient reports good insulin adherence, though A1C is > 12.   Home med: Insulin 18U Nightly and 8U pre-meals.   - Off Insulin  - Increase Lantus 2/13 to 21 units   - Increase Ademlog to  9 units TID w/ meals   - f/u endocrine recs  - lipid panel    Ethics  Full Code        For Follow-Up:  [ ] f/u endo recs  [ ] f/u ID recs  [ ] f/u surgery recs  [ ] monitor FS and tacro level   [ ] f/u transplant nephro recs MICU Transfer Note  ---------------------------    Transfer from: MICU  Transfer to:  (x  ) Medicine    (  ) Telemetry    (  ) RCU    (  ) Palliative    (  ) Stroke Unit    (  ) _______________  Accepting Physician:          38M w DM on insulin, ESRD (from FSGS) s/p renal transplant in 2019, presents with rectal pain. Patient reports has had rectal pain for 4 days or so, has not had bowel movement until day of admission for those 4 days due to pain. Denies fevers, chills, sick contacts. no nausea, vomiting diarrhea. Patient is on insulin at home, says his DM came after his transplant in 12/2019, has not had issues administering his insulin. He also has RLE pain for about the same time, no shortness of breath or chest pain.  In ED afebrile, hemodynamically table, breathing well on room air. In ED patient was given 3L of IV fluids, was briefly started on insulin gtt for DKA. Received Zosyn. Colorectal performed Incision and Drainage of  perirectal abscess.         MICU COURSE  Patient continued on the insulin gtt until 2/11. He was then bridged to basal bolus yesterday morning. Endo was consulted. ID was consulted and recommended c/w zosyn and vanc. Patient is tolerating PO diet.        OBJECTIVE --  Vital Signs Last 24 Hrs  T(C): 36.7 (12 Feb 2024 08:00), Max: 36.9 (11 Feb 2024 20:00)  T(F): 98.1 (12 Feb 2024 08:00), Max: 98.5 (11 Feb 2024 20:00)  HR: 84 (12 Feb 2024 10:00) (63 - 94)  BP: 108/86 (12 Feb 2024 10:00) (101/86 - 140/99)  BP(mean): 93 (12 Feb 2024 10:00) (87 - 115)  RR: 17 (12 Feb 2024 10:00) (8 - 23)  SpO2: 97% (12 Feb 2024 10:00) (97% - 100%)    Parameters below as of 12 Feb 2024 10:00  Patient On (Oxygen Delivery Method): room air        I&O's Summary    11 Feb 2024 07:01  -  12 Feb 2024 07:00  --------------------------------------------------------  IN: 1283.3 mL / OUT: 2400 mL / NET: -1116.7 mL    12 Feb 2024 07:01  -  12 Feb 2024 11:33  --------------------------------------------------------  IN: 490 mL / OUT: 300 mL / NET: 190 mL        MEDICATIONS  (STANDING):  dextrose 5%. 1000 milliLiter(s) (50 mL/Hr) IV Continuous <Continuous>  dextrose 5%. 1000 milliLiter(s) (100 mL/Hr) IV Continuous <Continuous>  dextrose 50% Injectable 25 Gram(s) IV Push once  dextrose 50% Injectable 12.5 Gram(s) IV Push once  dextrose 50% Injectable 25 Gram(s) IV Push once  dextrose 50% Injectable 25 Gram(s) IV Push once  enoxaparin Injectable 60 milliGRAM(s) SubCutaneous every 12 hours  glucagon  Injectable 1 milliGRAM(s) IntraMuscular once  insulin glargine Injectable (LANTUS) 18 Unit(s) SubCutaneous every morning  insulin lispro (ADMELOG) corrective regimen sliding scale   SubCutaneous at bedtime  insulin lispro (ADMELOG) corrective regimen sliding scale   SubCutaneous three times a day before meals  insulin lispro Injectable (ADMELOG) 8 Unit(s) SubCutaneous three times a day before meals  piperacillin/tazobactam IVPB.. 3.375 Gram(s) IV Intermittent every 8 hours  polyethylene glycol 3350 17 Gram(s) Oral daily  predniSONE   Tablet 5 milliGRAM(s) Oral daily  senna 2 Tablet(s) Oral at bedtime  tacrolimus 2 milliGRAM(s) Oral two times a day  vancomycin  IVPB 1000 milliGRAM(s) IV Intermittent every 12 hours    MEDICATIONS  (PRN):  dextrose Oral Gel 15 Gram(s) Oral once PRN Blood Glucose LESS THAN 70 milliGRAM(s)/deciliter        LABS                                            13.0                  Neurophils% (auto):   x      (02-12 @ 04:45):    4.17 )-----------(141          Lymphocytes% (auto):  x                                             38.0                   Eosinphils% (auto):   x        Manual%: Neutrophils x    ; Lymphocytes x    ; Eosinophils x    ; Bands%: x    ; Blasts x                                    137    |  104    |  8                   Calcium: 9.5   / iCa: x      (02-12 @ 06:27)    ----------------------------<  235       Magnesium: 2.60                             3.4     |  23     |  0.73             Phosphorous: 2.9      TPro  5.5    /  Alb  2.6    /  TBili  0.6    /  DBili  x      /  AST  107    /  ALT  150    /  AlkPhos  69     12 Feb 2024 06:27    ( 02-12 @ 04:45 )   PT: x    ;   INR: x      aPTT: 34.9 sec          ASSESSMENT & PLAN:   · Assessment	  38M w DM on insulin, 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    Neuro  No active issues  Pain control, as needed    Card  HTN  Home Meds: Nifedipine 30, Coreg 12.5 BID, Lisinopril 5  - normotensive inpatient  - resume home meds once pt becomes hypertensive    Pulm  No active issues    GI/Hep  Diet: Restarted on diet   Bowel regimen since with rectal pain  - f/u surgery recs    /Renal  S/p renal transplant, not on HD.  Hydronephrosis on CT  Home Meds: Pred 5, Mycophenolate 500 BID, Tacrolimus 2 BID  - transplant renal recs appreciated  - monitor SCr,   - monitor I/Os  - hold cellcept   - C/w Tacro 2 BID  - F/u tacro levels prior to AM dose   - f/u renal US regarding hydronephrosis on CT A/P   - c/w prednisone 5mg qd    Heme/Onc  Patient with RLE extensive DVT above and below knee  Unclear cause  - S/P Hep gtt   - Lovenox 60 BID for now, eventual DOAC  - f/u Hypercoagulable workup    ID  Perianal abscess  s/p I&D By surgery  UCx: negative  MRSA swab negative  -f/u BCx  - wound culture in lab, gram positive cocci in pairs f/u sensitivity  - c/w vanc and zosyn  - f/u ID recs    Endocrine  DM with DKA: pH 7.23, AG 28, Bicarb 8, BHB 7.1  Likely exacerbated by infection, as patient reports good insulin adherence, though A1C is > 12.   Home med: Insulin 18U Nightly and 8U pre-meals.   - Off Insulin  - Increase Lantus 2/13 to 21 units   - Increase Ademlog to  9 units TID w/ meals   - f/u endocrine recs  - lipid panel    Ethics  Full Code        For Follow-Up:  [ ] f/u endo recs  [ ] f/u ID recs  [ ] f/u surgery recs  [ ] f/u Hypercoagulable workup  [ ] monitor FS and tacro level   [ ] f/u transplant nephro recs MICU Transfer Note  ---------------------------    Transfer from: MICU  Transfer to:  (x  ) Medicine    (  ) Telemetry    (  ) RCU    (  ) Palliative    (  ) Stroke Unit    (  ) _______________  Accepting Physician:  Dr. Willow Serra        38M w DM on insulin, ESRD (from FSGS) s/p renal transplant in 2019, presents with rectal pain. Patient reports has had rectal pain for 4 days or so, has not had bowel movement until day of admission for those 4 days due to pain. Denies fevers, chills, sick contacts. no nausea, vomiting diarrhea. Patient is on insulin at home, says his DM came after his transplant in 12/2019, has not had issues administering his insulin. He also has RLE pain for about the same time, no shortness of breath or chest pain.  In ED afebrile, hemodynamically table, breathing well on room air. In ED patient was given 3L of IV fluids, was briefly started on insulin gtt for DKA. Received Zosyn. Colorectal performed Incision and Drainage of  perirectal abscess.         MICU COURSE  Patient continued on the insulin gtt until 2/11. He was then bridged to basal bolus yesterday morning. Endo was consulted. ID was consulted and recommended c/w zosyn and vanc. Patient is tolerating PO diet.        OBJECTIVE --  Vital Signs Last 24 Hrs  T(C): 36.7 (12 Feb 2024 08:00), Max: 36.9 (11 Feb 2024 20:00)  T(F): 98.1 (12 Feb 2024 08:00), Max: 98.5 (11 Feb 2024 20:00)  HR: 84 (12 Feb 2024 10:00) (63 - 94)  BP: 108/86 (12 Feb 2024 10:00) (101/86 - 140/99)  BP(mean): 93 (12 Feb 2024 10:00) (87 - 115)  RR: 17 (12 Feb 2024 10:00) (8 - 23)  SpO2: 97% (12 Feb 2024 10:00) (97% - 100%)    Parameters below as of 12 Feb 2024 10:00  Patient On (Oxygen Delivery Method): room air        I&O's Summary    11 Feb 2024 07:01  -  12 Feb 2024 07:00  --------------------------------------------------------  IN: 1283.3 mL / OUT: 2400 mL / NET: -1116.7 mL    12 Feb 2024 07:01  -  12 Feb 2024 11:33  --------------------------------------------------------  IN: 490 mL / OUT: 300 mL / NET: 190 mL        MEDICATIONS  (STANDING):  dextrose 5%. 1000 milliLiter(s) (50 mL/Hr) IV Continuous <Continuous>  dextrose 5%. 1000 milliLiter(s) (100 mL/Hr) IV Continuous <Continuous>  dextrose 50% Injectable 25 Gram(s) IV Push once  dextrose 50% Injectable 12.5 Gram(s) IV Push once  dextrose 50% Injectable 25 Gram(s) IV Push once  dextrose 50% Injectable 25 Gram(s) IV Push once  enoxaparin Injectable 60 milliGRAM(s) SubCutaneous every 12 hours  glucagon  Injectable 1 milliGRAM(s) IntraMuscular once  insulin glargine Injectable (LANTUS) 18 Unit(s) SubCutaneous every morning  insulin lispro (ADMELOG) corrective regimen sliding scale   SubCutaneous at bedtime  insulin lispro (ADMELOG) corrective regimen sliding scale   SubCutaneous three times a day before meals  insulin lispro Injectable (ADMELOG) 8 Unit(s) SubCutaneous three times a day before meals  piperacillin/tazobactam IVPB.. 3.375 Gram(s) IV Intermittent every 8 hours  polyethylene glycol 3350 17 Gram(s) Oral daily  predniSONE   Tablet 5 milliGRAM(s) Oral daily  senna 2 Tablet(s) Oral at bedtime  tacrolimus 2 milliGRAM(s) Oral two times a day  vancomycin  IVPB 1000 milliGRAM(s) IV Intermittent every 12 hours    MEDICATIONS  (PRN):  dextrose Oral Gel 15 Gram(s) Oral once PRN Blood Glucose LESS THAN 70 milliGRAM(s)/deciliter        LABS                                            13.0                  Neurophils% (auto):   x      (02-12 @ 04:45):    4.17 )-----------(141          Lymphocytes% (auto):  x                                             38.0                   Eosinphils% (auto):   x        Manual%: Neutrophils x    ; Lymphocytes x    ; Eosinophils x    ; Bands%: x    ; Blasts x                                    137    |  104    |  8                   Calcium: 9.5   / iCa: x      (02-12 @ 06:27)    ----------------------------<  235       Magnesium: 2.60                             3.4     |  23     |  0.73             Phosphorous: 2.9      TPro  5.5    /  Alb  2.6    /  TBili  0.6    /  DBili  x      /  AST  107    /  ALT  150    /  AlkPhos  69     12 Feb 2024 06:27    ( 02-12 @ 04:45 )   PT: x    ;   INR: x      aPTT: 34.9 sec          ASSESSMENT & PLAN:   · Assessment	  38M w DM on insulin, 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    Neuro  No active issues  Pain control, as needed    Card  HTN  Home Meds: Nifedipine 30, Coreg 12.5 BID, Lisinopril 5  - normotensive inpatient  - resume home meds once pt becomes hypertensive    Pulm  No active issues    GI/Hep  Diet: Restarted on diet   Bowel regimen since with rectal pain  - f/u surgery recs    /Renal  S/p renal transplant, not on HD.  Hydronephrosis on CT  Home Meds: Pred 5, Mycophenolate 500 BID, Tacrolimus 2 BID  - transplant renal recs appreciated  - monitor SCr,   - monitor I/Os  - hold cellcept   - C/w Tacro 2 BID  - F/u tacro levels prior to AM dose   - f/u renal US regarding hydronephrosis on CT A/P   - c/w prednisone 5mg qd    Heme/Onc  Patient with RLE extensive DVT above and below knee  Unclear cause  - S/P Hep gtt   - Lovenox 60 BID for now, eventual DOAC  - f/u Hypercoagulable workup    ID  Perianal abscess  s/p I&D By surgery  UCx: negative  MRSA swab negative  -f/u BCx  - wound culture in lab, gram positive cocci in pairs f/u sensitivity  - c/w vanc and zosyn  - f/u ID recs    Endocrine  DM with DKA: pH 7.23, AG 28, Bicarb 8, BHB 7.1  Likely exacerbated by infection, as patient reports good insulin adherence, though A1C is > 12.   Home med: Insulin 18U Nightly and 8U pre-meals.   - Off Insulin  - Increase Lantus 2/13 to 21 units   - Increase Ademlog to  9 units TID w/ meals   - f/u endocrine recs  - lipid panel    Ethics  Full Code        For Follow-Up:  [ ] f/u endo recs  [ ] f/u ID recs  [ ] f/u surgery recs  [ ] f/u Hypercoagulable workup  [ ] monitor FS and tacro level   [ ] f/u transplant nephro recs MICU Transfer Note  ---------------------------    Transfer from: MICU  Transfer to:  (x  ) Medicine    (  ) Telemetry    (  ) RCU    (  ) Palliative    (  ) Stroke Unit    (  ) _______________  Accepting Physician:  Dr. Willow Serra        38M w DM on insulin, ESRD (from FSGS) s/p renal transplant in 2019, presents with rectal pain. Patient reports has had rectal pain for 4 days or so, has not had bowel movement until day of admission for those 4 days due to pain. Denies fevers, chills, sick contacts. no nausea, vomiting diarrhea. Patient is on insulin at home, says his DM came after his transplant in 12/2019, has not had issues administering his insulin. He also has RLE pain for about the same time, no shortness of breath or chest pain.  In ED afebrile, hemodynamically table, breathing well on room air. In ED patient was given 3L of IV fluids, was briefly started on insulin gtt for DKA. Received Zosyn. Colorectal performed Incision and Drainage of  perirectal abscess.         MICU COURSE  Patient continued on the insulin gtt until 2/11. He was then bridged to basal bolus yesterday morning. Endo was consulted. ID was consulted and recommended c/w zosyn and vanc. Patient is tolerating PO diet.        OBJECTIVE --  Vital Signs Last 24 Hrs  T(C): 36.7 (12 Feb 2024 08:00), Max: 36.9 (11 Feb 2024 20:00)  T(F): 98.1 (12 Feb 2024 08:00), Max: 98.5 (11 Feb 2024 20:00)  HR: 84 (12 Feb 2024 10:00) (63 - 94)  BP: 108/86 (12 Feb 2024 10:00) (101/86 - 140/99)  BP(mean): 93 (12 Feb 2024 10:00) (87 - 115)  RR: 17 (12 Feb 2024 10:00) (8 - 23)  SpO2: 97% (12 Feb 2024 10:00) (97% - 100%)    Parameters below as of 12 Feb 2024 10:00  Patient On (Oxygen Delivery Method): room air        I&O's Summary    11 Feb 2024 07:01  -  12 Feb 2024 07:00  --------------------------------------------------------  IN: 1283.3 mL / OUT: 2400 mL / NET: -1116.7 mL    12 Feb 2024 07:01  -  12 Feb 2024 11:33  --------------------------------------------------------  IN: 490 mL / OUT: 300 mL / NET: 190 mL        MEDICATIONS  (STANDING):  dextrose 5%. 1000 milliLiter(s) (50 mL/Hr) IV Continuous <Continuous>  dextrose 5%. 1000 milliLiter(s) (100 mL/Hr) IV Continuous <Continuous>  dextrose 50% Injectable 25 Gram(s) IV Push once  dextrose 50% Injectable 12.5 Gram(s) IV Push once  dextrose 50% Injectable 25 Gram(s) IV Push once  dextrose 50% Injectable 25 Gram(s) IV Push once  enoxaparin Injectable 60 milliGRAM(s) SubCutaneous every 12 hours  glucagon  Injectable 1 milliGRAM(s) IntraMuscular once  insulin glargine Injectable (LANTUS) 18 Unit(s) SubCutaneous every morning  insulin lispro (ADMELOG) corrective regimen sliding scale   SubCutaneous at bedtime  insulin lispro (ADMELOG) corrective regimen sliding scale   SubCutaneous three times a day before meals  insulin lispro Injectable (ADMELOG) 8 Unit(s) SubCutaneous three times a day before meals  piperacillin/tazobactam IVPB.. 3.375 Gram(s) IV Intermittent every 8 hours  polyethylene glycol 3350 17 Gram(s) Oral daily  predniSONE   Tablet 5 milliGRAM(s) Oral daily  senna 2 Tablet(s) Oral at bedtime  tacrolimus 2 milliGRAM(s) Oral two times a day  vancomycin  IVPB 1000 milliGRAM(s) IV Intermittent every 12 hours    MEDICATIONS  (PRN):  dextrose Oral Gel 15 Gram(s) Oral once PRN Blood Glucose LESS THAN 70 milliGRAM(s)/deciliter        LABS                                            13.0                  Neurophils% (auto):   x      (02-12 @ 04:45):    4.17 )-----------(141          Lymphocytes% (auto):  x                                             38.0                   Eosinphils% (auto):   x        Manual%: Neutrophils x    ; Lymphocytes x    ; Eosinophils x    ; Bands%: x    ; Blasts x                                    137    |  104    |  8                   Calcium: 9.5   / iCa: x      (02-12 @ 06:27)    ----------------------------<  235       Magnesium: 2.60                             3.4     |  23     |  0.73             Phosphorous: 2.9      TPro  5.5    /  Alb  2.6    /  TBili  0.6    /  DBili  x      /  AST  107    /  ALT  150    /  AlkPhos  69     12 Feb 2024 06:27    ( 02-12 @ 04:45 )   PT: x    ;   INR: x      aPTT: 34.9 sec          ASSESSMENT & PLAN:   · Assessment	  38M w DM on insulin, 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    Neuro  No active issues  Pain control, as needed    Card  HTN  Home Meds: Nifedipine 30, Coreg 12.5 BID, Lisinopril 5  - normotensive inpatient  - resume home meds once pt becomes hypertensive    Pulm  No active issues    GI/Hep  Diet: Restarted on diet   Bowel regimen since with rectal pain  - f/u surgery recs    /Renal  S/p renal transplant, not on HD.  Hydronephrosis on CT  Home Meds: Pred 5, Mycophenolate 500 BID, Tacrolimus 2 BID  - transplant renal recs appreciated  - monitor SCr,   - monitor I/Os  - hold cellcept   - C/w Tacro 2 BID  - F/u tacro levels prior to AM dose   - f/u renal US regarding hydronephrosis on CT A/P   - c/w prednisone 5mg qd    Heme/Onc  Patient with RLE extensive DVT above and below knee  Unclear cause  - S/P Hep gtt, lovenox   - will start  DOAC  - f/u Hypercoagulable workup    ID  Perianal abscess  s/p I&D By surgery  UCx: negative  MRSA swab negative  -f/u BCx  - wound culture in lab, gram positive cocci in pairs f/u sensitivity  - c/w vanc and zosyn  - f/u ID recs    Endocrine  DM with DKA: pH 7.23, AG 28, Bicarb 8, BHB 7.1  Likely exacerbated by infection, as patient reports good insulin adherence, though A1C is > 12.   Home med: Insulin 18U Nightly and 8U pre-meals.   - Off Insulin  - Increase Lantus 2/13 to 21 units   - Increase Ademlog to  9 units TID w/ meals   - f/u endocrine recs  - lipid panel    Ethics  Full Code        For Follow-Up:  [ ] f/u endo, ID, surgery recs  [ ] f/u renal US  [ ] f/u Hypercoagulable workup  [ ] monitor FS and tacro level   [ ] f/u transplant nephro recs

## 2024-02-12 NOTE — PROGRESS NOTE ADULT - SUBJECTIVE AND OBJECTIVE BOX
Patient is a 38y old  Male who presents with a chief complaint of DKA (10 Feb 2024 17:23)    f/u perirectal abscess    Interval History/ROS:  no fever/chills.  DKA on insulin drip.  pain rectally better.   no abdominal pain.  no dysuria.  Remainder of ROS otherwise negative.    PAST MEDICAL & SURGICAL HISTORY:  DM (diabetes mellitus)  S/P kidney transplant  Hx ESRD was previously on HD via LUE AVF for 13 years until transplant    Allergies  penicillin (Hives; Angioedema)    ANTIMICROBIALS:    piperacillin/tazobactam IVPB.. 3.375 every 8 hours (2/10-)  vancomycin  IVPB 1000 every 12 hours (2/10-)    MEDICATIONS  (STANDING):  enoxaparin Injectable 60 every 12 hours  insulin lispro (ADMELOG) corrective regimen sliding scale  three times a day before meals  insulin lispro (ADMELOG) corrective regimen sliding scale  at bedtime  insulin lispro Injectable (ADMELOG) 9 three times a day before meals  polyethylene glycol 3350 17 daily  predniSONE   Tablet 5 daily  senna 2 at bedtime  tacrolimus 2 two times a day    Vital Signs Last 24 Hrs  T(F): 98.2 (02-12-24 @ 12:00), Max: 98.5 (02-11-24 @ 20:00)  HR: 84 (02-12-24 @ 12:00)  BP: 120/93 (02-12-24 @ 12:00)  RR: 23 (02-12-24 @ 12:00)  SpO2: 98% (02-12-24 @ 12:00) (97% - 100%)     PHYSICAL EXAM:  Constitutional: non-toxic, no distress  HEAD/EYES: anicteric  ENT:  supple  Cardiovascular:   normal S1, S2  Respiratory:  clear BS bilaterally  GI:  soft, perirectal collection with drain, some drainage  :  no richard  Musculoskeletal:  no synovitis, normal ROM  Neurologic: awake and alert, normal strength, no focal findings  Skin:  no rash  Psychiatric:  awake, alert, appropriate mood                      13.0   4.17  )-----------( 141      ( 12 Feb 2024 04:45 )             38.0 02-12    137  |  104  |  8   ----------------------------<  235  3.4   |  23  |  0.73  Ca    9.5      12 Feb 2024 06:27Phos  2.9     02-12Mg     2.60     02-12  TPro  5.5  /  Alb  2.6  /  TBili  0.6  /  DBili  x   /  AST  107  /  ALT  150  /  AlkPhos  69  02-12    Urinalysis + Microscopic Examination (02.10.24 @ 03:10)   pH Urine: 6.0  Urine Appearance: Clear  Color: Yellow  Specific Gravity: 1.038  Protein, Urine: Trace mg/dL  Glucose Qualitative, Urine: >=1000 mg/dL  Ketone - Urine: >=160 mg/dL  Blood, Urine: Negative  Bilirubin: Negative  Urobilinogen: 0.2 mg/dL  Leukocyte Esterase Concentration: Negative  Nitrite: Negative  White Blood Cell - Urine: 0 /HPF  Red Blood Cell - Urine: 0 /HPF  Bacteria: Negative /HPF  Cast: 0 /LPF  Epithelial Cells: 0 /HPF  Urinalysis Basic - ( 10 Feb 2024 10:30 )    MICROBIOLOGY:  Culture - Abscess with Gram Stain (collected 02-10-24 @ 11:45)  Source: .Abscess perianal abscess  Gram Stain (02-10-24 @ 18:12):    Few polymorphonuclear leukocytes per low power field    Few Gram positive cocci in pairs per oil power field  Preliminary Report (02-11-24 @ 15:45):    Numerous Staphylococcus aureus  Organism: Staphylococcus aureus (02-12-24 @ 10:18)  Organism: Staphylococcus aureus (02-12-24 @ 10:18)      Method Type: ROHIT      -  Ampicillin/Sulbactam: S <=8/4      -  Cefazolin: S <=4      -  Clindamycin: S 0.5      -  Erythromycin: S <=0.25      -  Gentamicin: S <=1 Should not be used as monotherapy      -  Oxacillin: S 0.5 Oxacillin predicts susceptibility for dicloxacillin, methicillin, and nafcillin      -  Penicillin: R >8      -  Rifampin: S <=1 Should not be used as monotherapy      -  Tetracycline: S <=1      -  Trimethoprim/Sulfamethoxazole: S <=0.5/9.5      -  Vancomycin: S 2    Culture - Urine (collected 02-10-24 @ 03:10)  Source: Clean Catch Clean Catch (Midstream)  Final Report (02-11-24 @ 11:34):    No growth    RADIOLOGY:  imaging below personally reviewed and agree with findings    CT Abdomen and Pelvis w/ IV Cont (02.10.24 @ 06:18) >  IMPRESSION:  Perianal abscess measuring 2.1 x 1.5 x 2.6 cm, seen at the anal verge.  7 mm cystic lesion of the pancreatic head. One year follow-up advised, preferably with MRI.  Right pelvic renal transplant with hydronephrosis.  Probable constipation.  Question gastritis versus underdistention.    US Duplex Venous Lower Ext Complete, Bilateral (02.10.24 @ 05:51) >  Acute deep venous thrombosis: above and below the knee.

## 2024-02-12 NOTE — PROGRESS NOTE ADULT - ASSESSMENT
Mr. Montes is a 38 year old male with a PMHx of T2DM, renal transplant who presents for perineal lesion. Endocrinology consulted for management of DKA.    Poorly controlled T2DM with hyperglycemia  DKA  - HbA1c: 12.2  - Home Regimen:  Lantus 18 units at bedtime, Humalog 8 units with meals, previously on a pill (does not remember the name)  - Endocrinologist: Dr. Merino  - presented with , AG 28, bicarb 8, pH 7.23, UA >160 consistent with DKA  - patients home regimen is significantly above weight based regimen and reports -400s at home and presented in DKA, adamant he has been compliant  PLAN  - Glucose Target 100-180 mg/dl: above goal. Pt denies eating in between meals. reviewed importance of following a consistent carb diet   - On Prednisone 5mg for renal transplant   - Given DKA, check zinc transporter8, IA2 antibody, NAZ 65 (specimen received awaiting results)  - Increase Lantus 21 units sq q 8am (1st dose in am )(please hold if npo/not eating)  - Increase Admelog to 9 units TID AC (please hold if npo/not eating)  - FS before meals and at bedtime   - Carbohydrate consistent diet when able  - RD consult  - Hypoglycemia Protocol  - Please change abx solution from dextrose to non dextrose solution   - Acidosis noted on am labs with elevated serum glucose fs taken 1 min later within goal; repeat labs within normal limits     Discharge plan:  - Discharge medications: basal (please do not hold as pt has a risk for DKA/bolus doses tbd  - Patient to call doctor with persistent high or low BG at home.   - Ensure patient has working glucometer, test strips, lancets, alcohol pads, and BD markell pen needles  - For severe hypoglycemia: Please prescribe Gvoke HypoPen One Pack 1mG/0.2mL subcutaneous solution: 1 mG subcutaneously as needed for severe hypoglycemia or Baqsimi One Pack 3mG nasal powder: 3 mg (one actuation) into a single nostril as needed for severe hypoglycemia. If not covered, please prescribe Glucagon Emergency Kit for Low Blood Sugar 1 mG injection: 1 mG intramuscularly as needed for severe hypoglycemia. Glucose tablets 4G (take 4 tablets) or 15G tablets for blood sugar less than 70 mG/dL repeat fingerstick in 15 minutes. Patient and family will need instructions on proper use and to call 911 after use.   - Ensure patient has glucometer, test strips and lancets on discharge.  - Recommend routine outpatient ophthalmology, podiatry and endocrinology f/u  - Please follow up with Dr. Merino private Endocrinologist     HTN  - Home regimen: not on medication per chart review  PLAN  - Can check urine microalbumin outpatient  - Outpatient goal BP <130/80. Management per primary team.    HLD  - Home regimen: not on statin per chart review  PLAN  - Would likely benefit from a statin if no contraindication  - Can check lipid profile if not done recently    D/w Resident Luis Miguel Baker  Nurse Practitioner  Division of Endocrinology & Diabetes  In house pager #[phone]    If before 9AM or after 6PM, or on weekends/holidays, please call endocrine answering service for assistance ([phone]).For nonurgent matters email [email or web address] for assistance.

## 2024-02-13 LAB
ALBUMIN SERPL ELPH-MCNC: 2.7 G/DL — LOW (ref 3.3–5)
ALP SERPL-CCNC: 65 U/L — SIGNIFICANT CHANGE UP (ref 40–120)
ALT FLD-CCNC: 168 U/L — HIGH (ref 4–41)
ANION GAP SERPL CALC-SCNC: 11 MMOL/L — SIGNIFICANT CHANGE UP (ref 7–14)
APTT BLD: 34 SEC — SIGNIFICANT CHANGE UP (ref 24.5–35.6)
AST SERPL-CCNC: 142 U/L — HIGH (ref 4–40)
BASOPHILS # BLD AUTO: 0 K/UL — SIGNIFICANT CHANGE UP (ref 0–0.2)
BASOPHILS NFR BLD AUTO: 0 % — SIGNIFICANT CHANGE UP (ref 0–2)
BILIRUB SERPL-MCNC: 0.6 MG/DL — SIGNIFICANT CHANGE UP (ref 0.2–1.2)
BUN SERPL-MCNC: 9 MG/DL — SIGNIFICANT CHANGE UP (ref 7–23)
CALCIUM SERPL-MCNC: 9.6 MG/DL — SIGNIFICANT CHANGE UP (ref 8.4–10.5)
CHLORIDE SERPL-SCNC: 103 MMOL/L — SIGNIFICANT CHANGE UP (ref 98–107)
CO2 SERPL-SCNC: 25 MMOL/L — SIGNIFICANT CHANGE UP (ref 22–31)
CREAT SERPL-MCNC: 0.82 MG/DL — SIGNIFICANT CHANGE UP (ref 0.5–1.3)
EGFR: 116 ML/MIN/1.73M2 — SIGNIFICANT CHANGE UP
EOSINOPHIL # BLD AUTO: 0.06 K/UL — SIGNIFICANT CHANGE UP (ref 0–0.5)
EOSINOPHIL NFR BLD AUTO: 1.9 % — SIGNIFICANT CHANGE UP (ref 0–6)
GLUCOSE BLDC GLUCOMTR-MCNC: 188 MG/DL — HIGH (ref 70–99)
GLUCOSE BLDC GLUCOMTR-MCNC: 216 MG/DL — HIGH (ref 70–99)
GLUCOSE BLDC GLUCOMTR-MCNC: 216 MG/DL — HIGH (ref 70–99)
GLUCOSE BLDC GLUCOMTR-MCNC: 283 MG/DL — HIGH (ref 70–99)
GLUCOSE SERPL-MCNC: 186 MG/DL — HIGH (ref 70–99)
HCT VFR BLD CALC: 39 % — SIGNIFICANT CHANGE UP (ref 39–50)
HGB BLD-MCNC: 13.7 G/DL — SIGNIFICANT CHANGE UP (ref 13–17)
IANC: 1.62 K/UL — LOW (ref 1.8–7.4)
IMM GRANULOCYTES NFR BLD AUTO: 0.3 % — SIGNIFICANT CHANGE UP (ref 0–0.9)
INR BLD: 1.08 RATIO — SIGNIFICANT CHANGE UP (ref 0.85–1.18)
ISLET CELL512 AB SER-ACNC: SIGNIFICANT CHANGE UP
LYMPHOCYTES # BLD AUTO: 0.82 K/UL — LOW (ref 1–3.3)
LYMPHOCYTES # BLD AUTO: 25.3 % — SIGNIFICANT CHANGE UP (ref 13–44)
MAGNESIUM SERPL-MCNC: 1.4 MG/DL — LOW (ref 1.6–2.6)
MCHC RBC-ENTMCNC: 32.6 PG — SIGNIFICANT CHANGE UP (ref 27–34)
MCHC RBC-ENTMCNC: 35.1 GM/DL — SIGNIFICANT CHANGE UP (ref 32–36)
MCV RBC AUTO: 92.9 FL — SIGNIFICANT CHANGE UP (ref 80–100)
MONOCYTES # BLD AUTO: 0.73 K/UL — SIGNIFICANT CHANGE UP (ref 0–0.9)
MONOCYTES NFR BLD AUTO: 22.5 % — HIGH (ref 2–14)
NEUTROPHILS # BLD AUTO: 1.62 K/UL — LOW (ref 1.8–7.4)
NEUTROPHILS NFR BLD AUTO: 50 % — SIGNIFICANT CHANGE UP (ref 43–77)
NRBC # BLD: 0 /100 WBCS — SIGNIFICANT CHANGE UP (ref 0–0)
NRBC # FLD: 0 K/UL — SIGNIFICANT CHANGE UP (ref 0–0)
PHOSPHATE SERPL-MCNC: 3.1 MG/DL — SIGNIFICANT CHANGE UP (ref 2.5–4.5)
PLATELET # BLD AUTO: 172 K/UL — SIGNIFICANT CHANGE UP (ref 150–400)
POTASSIUM SERPL-MCNC: 3.9 MMOL/L — SIGNIFICANT CHANGE UP (ref 3.5–5.3)
POTASSIUM SERPL-SCNC: 3.9 MMOL/L — SIGNIFICANT CHANGE UP (ref 3.5–5.3)
PROT C AG PPP-MCNC: 75 % — SIGNIFICANT CHANGE UP (ref 59–155)
PROT SERPL-MCNC: 5.7 G/DL — LOW (ref 6–8.3)
PROTHROM AB SERPL-ACNC: 12.2 SEC — SIGNIFICANT CHANGE UP (ref 9.5–13)
RBC # BLD: 4.2 M/UL — SIGNIFICANT CHANGE UP (ref 4.2–5.8)
RBC # FLD: 12.8 % — SIGNIFICANT CHANGE UP (ref 10.3–14.5)
SODIUM SERPL-SCNC: 139 MMOL/L — SIGNIFICANT CHANGE UP (ref 135–145)
TACROLIMUS SERPL-MCNC: 29 NG/ML — SIGNIFICANT CHANGE UP
WBC # BLD: 3.24 K/UL — LOW (ref 3.8–10.5)
WBC # FLD AUTO: 3.24 K/UL — LOW (ref 3.8–10.5)

## 2024-02-13 PROCEDURE — 99222 1ST HOSP IP/OBS MODERATE 55: CPT

## 2024-02-13 PROCEDURE — 99232 SBSQ HOSP IP/OBS MODERATE 35: CPT

## 2024-02-13 PROCEDURE — 99233 SBSQ HOSP IP/OBS HIGH 50: CPT | Mod: GC

## 2024-02-13 RX ORDER — INSULIN LISPRO 100/ML
9 VIAL (ML) SUBCUTANEOUS
Refills: 0 | Status: DISCONTINUED | OUTPATIENT
Start: 2024-02-13 | End: 2024-02-14

## 2024-02-13 RX ORDER — MAGNESIUM SULFATE 500 MG/ML
2 VIAL (ML) INJECTION
Refills: 0 | Status: COMPLETED | OUTPATIENT
Start: 2024-02-13 | End: 2024-02-13

## 2024-02-13 RX ORDER — INSULIN LISPRO 100/ML
11 VIAL (ML) SUBCUTANEOUS
Refills: 0 | Status: DISCONTINUED | OUTPATIENT
Start: 2024-02-14 | End: 2024-02-14

## 2024-02-13 RX ORDER — ACETAMINOPHEN 500 MG
650 TABLET ORAL EVERY 6 HOURS
Refills: 0 | Status: DISCONTINUED | OUTPATIENT
Start: 2024-02-13 | End: 2024-02-14

## 2024-02-13 RX ADMIN — Medication 3: at 13:22

## 2024-02-13 RX ADMIN — INSULIN GLARGINE 21 UNIT(S): 100 INJECTION, SOLUTION SUBCUTANEOUS at 08:56

## 2024-02-13 RX ADMIN — ENOXAPARIN SODIUM 60 MILLIGRAM(S): 100 INJECTION SUBCUTANEOUS at 18:57

## 2024-02-13 RX ADMIN — Medication 9 UNIT(S): at 13:23

## 2024-02-13 RX ADMIN — Medication 650 MILLIGRAM(S): at 19:30

## 2024-02-13 RX ADMIN — Medication 25 GRAM(S): at 11:50

## 2024-02-13 RX ADMIN — Medication 650 MILLIGRAM(S): at 18:57

## 2024-02-13 RX ADMIN — Medication 9 UNIT(S): at 09:03

## 2024-02-13 RX ADMIN — Medication 1: at 18:53

## 2024-02-13 RX ADMIN — MUPIROCIN 1 APPLICATION(S): 20 OINTMENT TOPICAL at 18:57

## 2024-02-13 RX ADMIN — MUPIROCIN 1 APPLICATION(S): 20 OINTMENT TOPICAL at 05:19

## 2024-02-13 RX ADMIN — Medication 2: at 09:00

## 2024-02-13 RX ADMIN — POLYETHYLENE GLYCOL 3350 17 GRAM(S): 17 POWDER, FOR SOLUTION ORAL at 11:51

## 2024-02-13 RX ADMIN — PIPERACILLIN AND TAZOBACTAM 25 GRAM(S): 4; .5 INJECTION, POWDER, LYOPHILIZED, FOR SOLUTION INTRAVENOUS at 19:45

## 2024-02-13 RX ADMIN — Medication 25 GRAM(S): at 08:55

## 2024-02-13 RX ADMIN — PIPERACILLIN AND TAZOBACTAM 25 GRAM(S): 4; .5 INJECTION, POWDER, LYOPHILIZED, FOR SOLUTION INTRAVENOUS at 03:05

## 2024-02-13 RX ADMIN — CHLORHEXIDINE GLUCONATE 1 APPLICATION(S): 213 SOLUTION TOPICAL at 11:51

## 2024-02-13 RX ADMIN — Medication 9 UNIT(S): at 18:56

## 2024-02-13 RX ADMIN — Medication 5 MILLIGRAM(S): at 05:20

## 2024-02-13 RX ADMIN — TACROLIMUS 2 MILLIGRAM(S): 5 CAPSULE ORAL at 05:49

## 2024-02-13 RX ADMIN — PIPERACILLIN AND TAZOBACTAM 25 GRAM(S): 4; .5 INJECTION, POWDER, LYOPHILIZED, FOR SOLUTION INTRAVENOUS at 11:50

## 2024-02-13 RX ADMIN — ENOXAPARIN SODIUM 60 MILLIGRAM(S): 100 INJECTION SUBCUTANEOUS at 05:20

## 2024-02-13 RX ADMIN — TACROLIMUS 2 MILLIGRAM(S): 5 CAPSULE ORAL at 18:56

## 2024-02-13 NOTE — PROGRESS NOTE ADULT - PROBLEM SELECTOR PLAN 4
not on HD.  Hydronephrosis on CT  Home Meds: Pred 5, Mycophenolate 500 BID, Tacrolimus 2 BID  Renala sono 2/12 without overt hydronephrosis.   - transplant renal recs appreciated  - monitor SCr,   - monitor I/Os  - hold cellcept i/s/o infection  - C/w Tacro 2 BID  - F/u tacro levels prior to AM dose   - c/w prednisone 5mg qd not on HD.  Hydronephrosis on CT  Home Meds: Pred 5, Mycophenolate 500 BID, Tacrolimus 2 BID  Renala sono 2/12 without overt hydronephrosis.   - transplant renal recs appreciated  - monitor SCr,   - monitor I/Os  - hold cellcept i/s/o infection  - C/w Tacro 2 BID  - F/u tacro levels prior to AM dose   - c/w prednisone 5mg qd  - f/u BK virus level since reportedly had + in december, this will dictate when can reintroduce cellcept.

## 2024-02-13 NOTE — PROGRESS NOTE ADULT - NSPROGADDITIONALINFOA_GEN_ALL_CORE
Patient with previous ESRD secondary to FSGS, renal transplant 2020, now admitted with rectal abscess complicated by DKA.   1. Renal transplant recipient - monitor serial renal function levels.  2. Immunosuppression - medication regimen recommendations as noted above. meds verified with pharmacy [phone]. keep off CellCept   3. Metabolic acidosis - improving   4. Hypophosphatemia - improving  I called his nephrologist to discuss Alton Villaseñor - Dr. Cipriano Kaminski   Nephrologist  [phone]  and discuss yesterday 2/12. he reported that he had BKV in Dec. we ordered BK serum PCR. please repeat Tacro before next dose tonight. level goal 4-7. hod Cellcept.

## 2024-02-13 NOTE — PROGRESS NOTE ADULT - ASSESSMENT
Mr. Montes is a 38 year old male with a PMHx of T2DM, renal transplant who presents for perineal lesion. Endocrinology consulted for management of DKA.    Poorly controlled T2DM with hyperglycemia  DKA  - HbA1c: 12.2  - Home Regimen:  Lantus 18 units at bedtime, Humalog 8 units with meals, previously on a pill (does not remember the name)  - Endocrinologist: Dr. Merino  - presented with , AG 28, bicarb 8, pH 7.23, UA >160 consistent with DKA  - patients home regimen is significantly above weight based regimen and reports -400s at home and presented in DKA, adamant he has been compliant  PLAN  - Glucose Target 100-180 mg/dl: above goal. Pt denies eating in between meals. reviewed importance of following a consistent carb diet   - On Prednisone 5mg for renal transplant   - Given DKA, check zinc transporter8, IA2 antibody, NAZ 65 (specimen received awaiting results)  - Increase Lantus 21 units sq q 8am (1st dose today ) (please hold if npo/not eating)  - Change Admelog to 11 units before breakfast 11 units before lunch and continue 9 units before dinner (please hold if npo/not eating)  - Continue Admelog low correction scale TID AC (consider increasing Admelog to moderate TID AC if remains elevated )and separate Admelog low correction scale qhs   - FS before meals and at bedtime   - Carbohydrate consistent diet when able  - RD consult  - Hypoglycemia Protocol  - Please change abx solution from dextrose to non dextrose solution   - Acidosis noted on am labs with elevated serum glucose fs taken 1 min later within goal; repeat labs within normal limits     Discharge plan:  - Discharge medications: basal (please do not hold as pt has a risk for DKA/bolus doses tbd  - Patient to call doctor with persistent high or low BG at home.   - Ensure patient has working glucometer, test strips, lancets, alcohol pads, and BD markell pen needles  - For severe hypoglycemia: Please prescribe Gvoke HypoPen One Pack 1mG/0.2mL subcutaneous solution: 1 mG subcutaneously as needed for severe hypoglycemia or Baqsimi One Pack 3mG nasal powder: 3 mg (one actuation) into a single nostril as needed for severe hypoglycemia. If not covered, please prescribe Glucagon Emergency Kit for Low Blood Sugar 1 mG injection: 1 mG intramuscularly as needed for severe hypoglycemia. Glucose tablets 4G (take 4 tablets) or 15G tablets for blood sugar less than 70 mG/dL repeat fingerstick in 15 minutes. Patient and family will need instructions on proper use and to call 911 after use.   - Ensure patient has glucometer, test strips and lancets on discharge.  - Recommend routine outpatient ophthalmology, podiatry and endocrinology f/u  - Please follow up with Dr. Merino private Endocrinologist Pt has an appointment on 2/19    HTN  - Home regimen: not on medication per chart review  PLAN  - Can check urine microalbumin outpatient  - Outpatient goal BP <130/80. Management per primary team.    HLD  - Home regimen: not on statin per chart review  PLAN  - Would likely benefit from a statin if no contraindication  - Can check lipid profile if not done recently      Alena Baker  Nurse Practitioner  Division of Endocrinology & Diabetes  In house pager #[phone]    If before 9AM or after 6PM, or on weekends/holidays, please call endocrine answering service for assistance ([phone]).For nonurgent matters email [email or web address] for assistance.

## 2024-02-13 NOTE — PROGRESS NOTE ADULT - PROBLEM SELECTOR PLAN 2
Currently on immunosuppression (oral tacrolimus 2mg q12,  mg BID and prednisone 5 mg PO OD) for kidney transplant. Recommend continuing  Tacrolimus 2mg q12 and home Prednisone 5mg daily. Given perirectal abscess, would hold MMF for now. Tacro lvl 4.9 on 2/11.    lvl today high ? true trough. would repeat again today before next dose please   . Check serum tacrolimus level (trough) 30 mins prior to am dose daily.

## 2024-02-13 NOTE — PROGRESS NOTE ADULT - PROBLEM SELECTOR PLAN 2
Patient with RLE extensive DVT above and below knee  Unclear cause  - Lovenox 60 BID (full AC)  - will start DOAC, need to ensure covered by insurance  - f/u Hypercoagulable workup, so far negative. Patient with RLE extensive DVT above and below knee  Unclear cause  Protein S, C activity low  - Lovenox 60 BID (full AC)  - will start DOAC, need to ensure covered by insurance  - f/u Hypercoagulable workup  - heme consulted, f/u recs

## 2024-02-13 NOTE — PROGRESS NOTE ADULT - ATTENDING COMMENTS
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   # rectal pain  Abscess , s/p I&D  C/w atb per ID , f/w recs for duration  # DVT   Hematology consult, low protein S  C/w Lovenox   # Renal transplant   Nephrology following , will f/w recs   f/w labs, cultures   # DM  Endocrinology following   Lovenox

## 2024-02-13 NOTE — CONSULT NOTE ADULT - ATTENDING COMMENTS
This 38-year-old man was seen in consultation by the hematology consult service.  The history was reviewed at the bedside.  Laboratory values were reviewed from his hospital chart.  Imaging studies were reviewed as well.  In short he is a insulin-dependent diabetic with end-stage renal disease due to focal segmental glomerulosclerosis.  He had a renal transplant in 2019 and he presented with a rectal abscess, infected with MSSA.  He had an episode of DKA.  He works as a .  A Doppler was performed that showed a right occlusive thrombus within the proximal right greater saphenous vein, popliteal vein, gastrocnemius vein, posterior tibial vein, and brachial veins.  An antiphospholipid antibody assessment is negative, and therefore he can be transition to a DOAC.  He was adequately heparinized, and this excludes Antithrombin III deficiency.  Protein C and protein S should not be checked during an acute clot, as they are unreliable.  Genetic testing for factor V Leiden and prothrombin gene mutations will not change the recommendations, and these can be performed as an outpatient.    I agree with the assessment and plan as stated above in the note from Dr. Liang, hematology fellow  Matthew Huff MD  Hematology attending

## 2024-02-13 NOTE — PROGRESS NOTE ADULT - SUBJECTIVE AND OBJECTIVE BOX
Chief Complaint: Type 2 DM     History: Pt seen at bedside. Pt tolerating oral diet. Pt denies nausea and vomiting/any signs of hypoglycemia. Pt reports an adequate appetite.     MEDICATIONS  (STANDING):  chlorhexidine 2% Cloths 1 Application(s) Topical daily  dextrose 5%. 1000 milliLiter(s) (50 mL/Hr) IV Continuous <Continuous>  dextrose 5%. 1000 milliLiter(s) (100 mL/Hr) IV Continuous <Continuous>  dextrose 50% Injectable 12.5 Gram(s) IV Push once  dextrose 50% Injectable 25 Gram(s) IV Push once  dextrose 50% Injectable 25 Gram(s) IV Push once  enoxaparin Injectable 60 milliGRAM(s) SubCutaneous every 12 hours  glucagon  Injectable 1 milliGRAM(s) IntraMuscular once  insulin glargine Injectable (LANTUS) 21 Unit(s) SubCutaneous every morning  insulin lispro (ADMELOG) corrective regimen sliding scale   SubCutaneous three times a day before meals  insulin lispro (ADMELOG) corrective regimen sliding scale   SubCutaneous at bedtime  insulin lispro Injectable (ADMELOG) 9 Unit(s) SubCutaneous before dinner  mupirocin 2% Ointment 1 Application(s) Topical every 12 hours  piperacillin/tazobactam IVPB.. 3.375 Gram(s) IV Intermittent every 8 hours  polyethylene glycol 3350 17 Gram(s) Oral daily  predniSONE   Tablet 5 milliGRAM(s) Oral daily  senna 2 Tablet(s) Oral at bedtime  tacrolimus 2 milliGRAM(s) Oral two times a day    MEDICATIONS  (PRN):  dextrose Oral Gel 15 Gram(s) Oral once PRN Blood Glucose LESS THAN 70 milliGRAM(s)/deciliter      Allergies: penicillin (Hives; Angioedema)      Review of Systems:  Respiratory: No SOB, no cough  GI: No nausea, vomiting, abdominal pain  Endocrine: no polyuria, polydipsia    PHYSICAL EXAM:  VITALS: T(C): 36.7 (02-13-24 @ 12:37)  T(F): 98 (02-13-24 @ 12:37), Max: 99 (02-12-24 @ 21:45)  HR: 74 (02-13-24 @ 12:37) (67 - 76)  BP: 103/69 (02-13-24 @ 12:37) (103/69 - 117/84)  RR:  (16 - 18)  SpO2:  (99% - 100%)  Wt(kg): --  GENERAL: NAD, well-groomed, well-developed  RESPIRATORY: No labored breathing   GI: Soft, nontender, non distended  PSYCH: Alert and oriented x 3, normal affect, normal mood      CAPILLARY BLOOD GLUCOSE  POCT Blood Glucose.: 283 mg/dL (13 Feb 2024 12:23)  POCT Blood Glucose.: 216 mg/dL (13 Feb 2024 08:26)  POCT Blood Glucose.: 138 mg/dL (12 Feb 2024 22:19)  POCT Blood Glucose.: 171 mg/dL (12 Feb 2024 18:06)    A1C with Estimated Average Glucose (02.10.24 @ 03:30)    A1C with Estimated Average Glucose Result: 12.2   Estimated Average Glucose: 303      02-13    139  |  103  |  9   ----------------------------<  186<H>  3.9   |  25  |  0.82    eGFR: 116    Ca    9.6      02-13  Mg     1.40     02-13  Phos  3.1     02-13    TPro  5.7<L>  /  Alb  2.7<L>  /  TBili  0.6  /  DBili  x   /  AST  142<H>  /  ALT  168<H>  /  AlkPhos  65  02-13    Diet, Regular:   Consistent Carbohydrate No Snacks (CSTCHO) (02-11-24 @ 05:15) [Active]

## 2024-02-13 NOTE — PROGRESS NOTE ADULT - SUBJECTIVE AND OBJECTIVE BOX
Subjective:    INTERVAL HPI/OVERNIGHT EVENTS:   No acute events overnight  Afebrile, hemodynamically stable   - Patient Denies Fevers, Chills, Headache, CP, Palpitations, SOB, Abdominal Pain, Dysuria, N/V/D    Telemetry:    T(F): 97.3 (02-13-24 @ 05:40), Max: 99 (02-12-24 @ 21:45)  HR: 71 (02-13-24 @ 05:40) (67 - 86)  BP: 111/78 (02-13-24 @ 05:40) (101/86 - 136/101)  RR: 18 (02-13-24 @ 05:40) (9 - 23)  SpO2: 99% (02-13-24 @ 05:40) (97% - 100%)  I&O's Summary    12 Feb 2024 07:01  -  13 Feb 2024 07:00  --------------------------------------------------------  IN: 490 mL / OUT: 300 mL / NET: 190 mL      Weight (kg): 62.959 (02-10-24 @ 12:50)    Medications:  chlorhexidine 2% Cloths 1 Application(s) Topical daily  dextrose 5%. 1000 milliLiter(s) (50 mL/Hr) IV Continuous <Continuous>  dextrose 5%. 1000 milliLiter(s) (100 mL/Hr) IV Continuous <Continuous>  dextrose 50% Injectable 12.5 Gram(s) IV Push once  dextrose 50% Injectable 25 Gram(s) IV Push once  dextrose 50% Injectable 25 Gram(s) IV Push once  dextrose Oral Gel 15 Gram(s) Oral once PRN  enoxaparin Injectable 60 milliGRAM(s) SubCutaneous every 12 hours  glucagon  Injectable 1 milliGRAM(s) IntraMuscular once  insulin glargine Injectable (LANTUS) 21 Unit(s) SubCutaneous every morning  insulin lispro (ADMELOG) corrective regimen sliding scale   SubCutaneous at bedtime  insulin lispro (ADMELOG) corrective regimen sliding scale   SubCutaneous three times a day before meals  insulin lispro Injectable (ADMELOG) 9 Unit(s) SubCutaneous three times a day before meals  mupirocin 2% Ointment 1 Application(s) Topical every 12 hours  piperacillin/tazobactam IVPB.. 3.375 Gram(s) IV Intermittent every 8 hours  polyethylene glycol 3350 17 Gram(s) Oral daily  predniSONE   Tablet 5 milliGRAM(s) Oral daily  senna 2 Tablet(s) Oral at bedtime  tacrolimus 2 milliGRAM(s) Oral two times a day      PHYSICAL EXAM:  GENERAL: NAD, well-groomed, well-developed  HEAD:  Atraumatic, Normocephalic  EYES: EOMI, PERRLA, conjunctiva and sclera clear  ENMT:  Moist mucous membranes  NECK: No JVD  CHEST/LUNG: Clear to auscultation bilaterally; No rales, rhonchi, wheezing, or rubs  HEART: Regular rate and rhythm; No murmurs, rubs, or gallops  ABDOMEN: Soft, Nontender, Nondistended; Bowel sounds present  VASCULAR:  No clubbing, cyanosis, or edema  LYMPH: No lymphadenopathy noted  SKIN: No rashes or lesions  NERVOUS SYSTEM:  Alert & Oriented X3     Notable Labs:    Labs:                          13.7   3.24  )-----------( 172      ( 13 Feb 2024 06:32 )             39.0     02-12    134<L>  |  98  |  9   ----------------------------<  387<H>  4.0   |  25  |  0.74    Ca    9.1      12 Feb 2024 17:35  Phos  2.0     02-12  Mg     1.50     02-12    TPro  5.5<L>  /  Alb  2.6<L>  /  TBili  0.6  /  DBili  x   /  AST  107<H>  /  ALT  150<H>  /  AlkPhos  69  02-12    LIVER FUNCTIONS - ( 12 Feb 2024 06:27 )  Alb: 2.6 g/dL / Pro: 5.5 g/dL / ALK PHOS: 69 U/L / ALT: 150 U/L / AST: 107 U/L / GGT: x           PT/INR - ( 13 Feb 2024 06:32 )   PT: 12.2 sec;   INR: 1.08 ratio         PTT - ( 13 Feb 2024 06:32 )  PTT:34.0 sec  CAPILLARY BLOOD GLUCOSE      POCT Blood Glucose.: 138 mg/dL (12 Feb 2024 22:19)  POCT Blood Glucose.: 171 mg/dL (12 Feb 2024 18:06)  POCT Blood Glucose.: 246 mg/dL (12 Feb 2024 11:35)  POCT Blood Glucose.: 205 mg/dL (12 Feb 2024 07:57)            Urinalysis Basic - ( 12 Feb 2024 17:35 )    Color: x / Appearance: x / SG: x / pH: x  Gluc: 387 mg/dL / Ketone: x  / Bili: x / Urobili: x   Blood: x / Protein: x / Nitrite: x   Leuk Esterase: x / RBC: x / WBC x   Sq Epi: x / Non Sq Epi: x / Bacteria: x        Micro:    Culture - Abscess with Gram Stain (collected 02-10-24 @ 11:45)  Source: .Abscess perianal abscess  Gram Stain (02-10-24 @ 18:12):    Few polymorphonuclear leukocytes per low power field    Few Gram positive cocci in pairs per oil power field  Preliminary Report (02-11-24 @ 15:45):    Numerous Staphylococcus aureus  Organism: Staphylococcus aureus (02-12-24 @ 10:18)  Organism: Staphylococcus aureus (02-12-24 @ 10:18)      Method Type: ROHIT      -  Ampicillin/Sulbactam: S <=8/4      -  Cefazolin: S <=4      -  Clindamycin: S 0.5      -  Erythromycin: S <=0.25      -  Gentamicin: S <=1 Should not be used as monotherapy      -  Oxacillin: S 0.5 Oxacillin predicts susceptibility for dicloxacillin, methicillin, and nafcillin      -  Penicillin: R >8      -  Rifampin: S <=1 Should not be used as monotherapy      -  Tetracycline: S <=1      -  Trimethoprim/Sulfamethoxazole: S <=0.5/9.5      -  Vancomycin: S 2        RADIOLOGY & ADDITIONAL TESTS:    EKG:      Echo:      Xray:      US/CT/MRI:  < from: US Kidney and Bladder (02.12.24 @ 16:18) >  IMPRESSION:    Fullness of the renal pelvis and calyces in the right lower quadrant   transplanted kidney without overt hydronephrosis.    < end of copied text >

## 2024-02-13 NOTE — PROGRESS NOTE ADULT - SUBJECTIVE AND OBJECTIVE BOX
[facility] DIVISION OF KIDNEY DISEASES AND HYPERTENSION -- FOLLOW UP NOTE  BENJA Fellow pager # [phone]  Nephrology office # [phone]  Available on [employer] teams--> Maged Zacarias  -----------------------------------------------------------------------------  Chief Complaint:/subjective:  doing much better. no pain         24 hour events:            ALLERGIES & MEDICATIONS  --------------------------------------------------------------------------------  Allergies    penicillin (Hives; Angioedema)    Intolerances      Standing Inpatient Medications  chlorhexidine 2% Cloths 1 Application(s) Topical daily  dextrose 5%. 1000 milliLiter(s) IV Continuous <Continuous>  dextrose 5%. 1000 milliLiter(s) IV Continuous <Continuous>  dextrose 50% Injectable 12.5 Gram(s) IV Push once  dextrose 50% Injectable 25 Gram(s) IV Push once  dextrose 50% Injectable 25 Gram(s) IV Push once  enoxaparin Injectable 60 milliGRAM(s) SubCutaneous every 12 hours  glucagon  Injectable 1 milliGRAM(s) IntraMuscular once  insulin glargine Injectable (LANTUS) 21 Unit(s) SubCutaneous every morning  insulin lispro (ADMELOG) corrective regimen sliding scale   SubCutaneous at bedtime  insulin lispro (ADMELOG) corrective regimen sliding scale   SubCutaneous three times a day before meals  insulin lispro Injectable (ADMELOG) 9 Unit(s) SubCutaneous three times a day before meals  magnesium sulfate  IVPB 2 Gram(s) IV Intermittent every 2 hours  mupirocin 2% Ointment 1 Application(s) Topical every 12 hours  piperacillin/tazobactam IVPB.. 3.375 Gram(s) IV Intermittent every 8 hours  polyethylene glycol 3350 17 Gram(s) Oral daily  predniSONE   Tablet 5 milliGRAM(s) Oral daily  senna 2 Tablet(s) Oral at bedtime  tacrolimus 2 milliGRAM(s) Oral two times a day    PRN Inpatient Medications  dextrose Oral Gel 15 Gram(s) Oral once PRN      REVIEW OF SYSTEMS  --------------------------------------------------------------------------------    Gen: No  fevers/chills  Skin: No rashes  Respiratory: no SOB  CV: No chest pain,   GI: No abdominal pain  :  -oliguria   MSK: No joint pain/   -swelling;     All other systems were reviewed and are negative, except as noted.      VITALS/PHYSICAL EXAM  --------------------------------------------------------------------------------  T(C): 36.3 (02-13-24 @ 05:40), Max: 37.2 (02-12-24 @ 21:45)  HR: 71 (02-13-24 @ 05:40) (67 - 84)  BP: 111/78 (02-13-24 @ 05:40) (104/75 - 120/93)  RR: 18 (02-13-24 @ 05:40) (17 - 23)  SpO2: 99% (02-13-24 @ 05:40) (98% - 100%)  Wt(kg): --        02-12-24 @ 07:01  -  02-13-24 @ 07:00  --------------------------------------------------------  IN: 490 mL / OUT: 300 mL / NET: 190 mL      Physical Exam:  	Gen NAD  	HEENT: no JVD  	Pulm: CTABL  	CV: S1S2,  	Abd: Soft,   	Ext:   - edema B/L LE   	Neuro: Awake and alert  	Skin: Warm and dry               LABS/STUDIES  --------------------------------------------------------------------------------              13.7   3.24  >-----------<  172      [02-13-24 @ 06:32]              39.0     Hemoglobin: 13.7 g/dL (02-13-24 @ 06:32)  Hemoglobin: 13.0 g/dL (02-12-24 @ 04:45)    Platelet Count - Automated: 172 K/uL (02-13-24 @ 06:32)  Platelet Count - Automated: 141 K/uL (02-12-24 @ 04:45)    139  |  103  |  9   ----------------------------<  186      [02-13-24 @ 06:32]  3.9   |  25  |  0.82        Ca     9.6     [02-13-24 @ 06:32]      Mg     1.40     [02-13-24 @ 06:32]      Phos  3.1     [02-13-24 @ 06:32]    TPro  5.7  /  Alb  2.7  /  TBili  0.6  /  DBili  x   /  AST  142  /  ALT  168  /  AlkPhos  65  [02-13-24 @ 06:32]    PT/INR: PT 12.2 , INR 1.08       [02-13-24 @ 06:32]  PTT: 34.0       [02-13-24 @ 06:32]      Creatinine: 0.82 mg/dL (02-13-24 @ 06:32)  Creatinine: 0.74 mg/dL (02-12-24 @ 17:35)  Creatinine: 0.73 mg/dL (02-12-24 @ 06:27)  Creatinine: 0.72 mg/dL (02-12-24 @ 04:45)  Creatinine: 0.76 mg/dL (02-12-24 @ 00:00)  Creatinine: 0.65 mg/dL (02-11-24 @ 20:15)  Creatinine: 0.70 mg/dL (02-11-24 @ 16:13)  Creatinine: 0.58 mg/dL (02-11-24 @ 11:03)  Creatinine: 0.66 mg/dL (02-11-24 @ 04:39)  Creatinine: 0.66 mg/dL (02-11-24 @ 01:00)  Creatinine: 0.73 mg/dL (02-10-24 @ 21:30)  Creatinine: 0.68 mg/dL (02-10-24 @ 15:45)  Creatinine: 0.74 mg/dL (02-10-24 @ 10:30)  Creatinine: 1.09 mg/dL (02-10-24 @ 03:30)    SODIUM TREND:  Sodium 139 [02-13 @ 06:32]  Sodium 134 [02-12 @ 17:35]  Sodium 137 [02-12 @ 06:27]  Sodium 128 [02-12 @ 04:45]  Sodium 138 [02-12 @ 00:00]  Sodium 134 [02-11 @ 20:15]  Sodium 137 [02-11 @ 16:13]  Sodium 134 [02-11 @ 11:03]  Sodium 137 [02-11 @ 04:39]  Sodium 137 [02-11 @ 01:00]        SCr 0.82 [02-13 @ 06:32]  SCr 0.74 [02-12 @ 17:35]  SCr 0.73 [02-12 @ 06:27]  SCr 0.72 [02-12 @ 04:45]  SCr 0.76 [02-12 @ 00:00]    Urinalysis - [02-13-24 @ 06:32]      Color  / Appearance  / SG  / pH       Gluc 186 / Ketone   / Bili  / Urobili        Blood  / Protein  / Leuk Est  / Nitrite       RBC  / WBC  / Hyaline  / Gran  / Sq Epi  / Non Sq Epi  / Bacteria       Lipid: chol 187, , HDL 24, LDL --      [02-10-24 @ 15:45]

## 2024-02-13 NOTE — CONSULT NOTE ADULT - ASSESSMENT
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    # RLE extensive DVT above and below knee  - patient is a  which may be the provoking risk factor  - No family or personal h/o DVT  - US doppler showed: RIGHT Occlusive thrombus within the proximal right greater saphenous vein, popliteal vein, gastrocnemius vein, posterior tibial vein, and brachial   veins.  - Negative APLS labs ( lupus anticoagulant, anticardiolipin and antibeta2 glycoprotein)-> he can transition from heparin to eliquis  - Factor V Leiden and Prothrombin gene mutation can be done outpatient  - We don't recommend checking antithrombin 3, protein C or S as they may be falsely low in acute thrombotic state and results can be misinterpreted.  - Patient will need to be on AC for at least 3 months   - He can follow up with a hematologist outpatient once discharged.

## 2024-02-13 NOTE — CONSULT NOTE ADULT - SUBJECTIVE AND OBJECTIVE BOX
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  Patient reports has had rectal pain for 4 days or so, and was found to have rectal abscess. Denies fevers, chills, sick contacts. no nausea, vomiting diarrhea. Patient is on insulin at home, says his DM came after his transplant in 12/2019, has not had issues administering his insulin. He also has RLE pain for about the same time, no shortness of breath or chest pain. US doppler revealed right LE extensive DVT. Heme was consulted for DVT and hypercoagulability work up.  Patient states he works as  and drives a lot. No other risk factors noted.       PAST MEDICAL & SURGICAL HISTORY:  DM (diabetes mellitus)      S/P kidney transplant          Allergies    penicillin (Hives; Angioedema)    Intolerances        MEDICATIONS  (STANDING):  chlorhexidine 2% Cloths 1 Application(s) Topical daily  dextrose 5%. 1000 milliLiter(s) (50 mL/Hr) IV Continuous <Continuous>  dextrose 5%. 1000 milliLiter(s) (100 mL/Hr) IV Continuous <Continuous>  dextrose 50% Injectable 12.5 Gram(s) IV Push once  dextrose 50% Injectable 25 Gram(s) IV Push once  dextrose 50% Injectable 25 Gram(s) IV Push once  enoxaparin Injectable 60 milliGRAM(s) SubCutaneous every 12 hours  glucagon  Injectable 1 milliGRAM(s) IntraMuscular once  insulin glargine Injectable (LANTUS) 21 Unit(s) SubCutaneous every morning  insulin lispro (ADMELOG) corrective regimen sliding scale   SubCutaneous at bedtime  insulin lispro (ADMELOG) corrective regimen sliding scale   SubCutaneous three times a day before meals  insulin lispro Injectable (ADMELOG) 9 Unit(s) SubCutaneous before dinner  mupirocin 2% Ointment 1 Application(s) Topical every 12 hours  piperacillin/tazobactam IVPB.. 3.375 Gram(s) IV Intermittent every 8 hours  polyethylene glycol 3350 17 Gram(s) Oral daily  predniSONE   Tablet 5 milliGRAM(s) Oral daily  senna 2 Tablet(s) Oral at bedtime  tacrolimus 2 milliGRAM(s) Oral two times a day    MEDICATIONS  (PRN):  acetaminophen     Tablet .. 650 milliGRAM(s) Oral every 6 hours PRN Temp greater or equal to 38C (100.4F), Mild Pain (1 - 3)  dextrose Oral Gel 15 Gram(s) Oral once PRN Blood Glucose LESS THAN 70 milliGRAM(s)/deciliter      FAMILY HISTORY:  FH: type 2 diabetes (Father)        SOCIAL HISTORY: No EtOH, no tobacco    REVIEW OF SYSTEMS:       All other review of systems is negative unless indicated above.        T(F): 97.8 (02-13-24 @ 16:45), Max: 99 (02-12-24 @ 21:45)  HR: 70 (02-13-24 @ 16:45)  BP: 109/81 (02-13-24 @ 16:45)  RR: 20 (02-13-24 @ 16:45)  SpO2: 100% (02-13-24 @ 16:45)  Wt(kg): --    GENERAL: NAD, well-developed  HEAD:  Atraumatic, Normocephalic  EYES: EOMI, PERRLA, conjunctiva and sclera clear  NECK: Supple, No JVD  CHEST/LUNG: Clear to auscultation bilaterally; No wheeze  HEART: Regular rate and rhythm; No murmurs, rubs, or gallops  ABDOMEN: Soft, Nontender, Nondistended; Bowel sounds present  EXTREMITIES:  2+ Peripheral Pulses, No clubbing, cyanosis. Right LE calf pain and mild tenderness.   NEUROLOGY: non-focal  SKIN: No rashes or lesions                          13.7   3.24  )-----------( 172      ( 13 Feb 2024 06:32 )             39.0       02-13    139  |  103  |  9   ----------------------------<  186<H>  3.9   |  25  |  0.82    Ca    9.6      13 Feb 2024 06:32  Phos  3.1     02-13  Mg     1.40     02-13    TPro  5.7<L>  /  Alb  2.7<L>  /  TBili  0.6  /  DBili  x   /  AST  142<H>  /  ALT  168<H>  /  AlkPhos  65  02-13      Magnesium: 1.40 mg/dL (02-13 @ 06:32)  Phosphorus: 3.1 mg/dL (02-13 @ 06:32)      PT/INR - ( 13 Feb 2024 06:32 )   PT: 12.2 sec;   INR: 1.08 ratio         PTT - ( 13 Feb 2024 06:32 )  PTT:34.0 sec    .Abscess perianal abscess  02-10 @ 11:45   Numerous Staphylococcus aureus  --  Staphylococcus aureus      Clean Catch Clean Catch (Midstream)  02-10 @ 03:10   No growth  --  --

## 2024-02-14 ENCOUNTER — TRANSCRIPTION ENCOUNTER (OUTPATIENT)
Age: 39
End: 2024-02-14

## 2024-02-14 ENCOUNTER — NON-APPOINTMENT (OUTPATIENT)
Age: 39
End: 2024-02-14

## 2024-02-14 VITALS
DIASTOLIC BLOOD PRESSURE: 82 MMHG | HEART RATE: 72 BPM | OXYGEN SATURATION: 100 % | SYSTOLIC BLOOD PRESSURE: 118 MMHG | RESPIRATION RATE: 18 BRPM | TEMPERATURE: 98 F

## 2024-02-14 LAB
ALBUMIN SERPL ELPH-MCNC: 2.9 G/DL — LOW (ref 3.3–5)
ALP SERPL-CCNC: 66 U/L — SIGNIFICANT CHANGE UP (ref 40–120)
ALT FLD-CCNC: 188 U/L — HIGH (ref 4–41)
ANION GAP SERPL CALC-SCNC: 8 MMOL/L — SIGNIFICANT CHANGE UP (ref 7–14)
AST SERPL-CCNC: 159 U/L — HIGH (ref 4–40)
AT III AG PPP IA-MCNC: 15 MG/DL — LOW (ref 19–31)
BASOPHILS # BLD AUTO: 0 K/UL — SIGNIFICANT CHANGE UP (ref 0–0.2)
BASOPHILS NFR BLD AUTO: 0 % — SIGNIFICANT CHANGE UP (ref 0–2)
BILIRUB SERPL-MCNC: 0.5 MG/DL — SIGNIFICANT CHANGE UP (ref 0.2–1.2)
BUN SERPL-MCNC: 14 MG/DL — SIGNIFICANT CHANGE UP (ref 7–23)
CALCIUM SERPL-MCNC: 9.9 MG/DL — SIGNIFICANT CHANGE UP (ref 8.4–10.5)
CHLORIDE SERPL-SCNC: 103 MMOL/L — SIGNIFICANT CHANGE UP (ref 98–107)
CO2 SERPL-SCNC: 27 MMOL/L — SIGNIFICANT CHANGE UP (ref 22–31)
CREAT SERPL-MCNC: 0.94 MG/DL — SIGNIFICANT CHANGE UP (ref 0.5–1.3)
EGFR: 107 ML/MIN/1.73M2 — SIGNIFICANT CHANGE UP
EOSINOPHIL # BLD AUTO: 0.05 K/UL — SIGNIFICANT CHANGE UP (ref 0–0.5)
EOSINOPHIL NFR BLD AUTO: 1.4 % — SIGNIFICANT CHANGE UP (ref 0–6)
GLUCOSE BLDC GLUCOMTR-MCNC: 181 MG/DL — HIGH (ref 70–99)
GLUCOSE BLDC GLUCOMTR-MCNC: 258 MG/DL — HIGH (ref 70–99)
GLUCOSE SERPL-MCNC: 168 MG/DL — HIGH (ref 70–99)
HCT VFR BLD CALC: 38.1 % — LOW (ref 39–50)
HGB BLD-MCNC: 13.2 G/DL — SIGNIFICANT CHANGE UP (ref 13–17)
HIV 1+2 AB+HIV1 P24 AG SERPL QL IA: SIGNIFICANT CHANGE UP
IANC: 1.42 K/UL — LOW (ref 1.8–7.4)
IMM GRANULOCYTES NFR BLD AUTO: 0.3 % — SIGNIFICANT CHANGE UP (ref 0–0.9)
LYMPHOCYTES # BLD AUTO: 1.21 K/UL — SIGNIFICANT CHANGE UP (ref 1–3.3)
LYMPHOCYTES # BLD AUTO: 34.4 % — SIGNIFICANT CHANGE UP (ref 13–44)
MAGNESIUM SERPL-MCNC: 1.6 MG/DL — SIGNIFICANT CHANGE UP (ref 1.6–2.6)
MCHC RBC-ENTMCNC: 32.5 PG — SIGNIFICANT CHANGE UP (ref 27–34)
MCHC RBC-ENTMCNC: 34.6 GM/DL — SIGNIFICANT CHANGE UP (ref 32–36)
MCV RBC AUTO: 93.8 FL — SIGNIFICANT CHANGE UP (ref 80–100)
MONOCYTES # BLD AUTO: 0.83 K/UL — SIGNIFICANT CHANGE UP (ref 0–0.9)
MONOCYTES NFR BLD AUTO: 23.6 % — HIGH (ref 2–14)
NEUTROPHILS # BLD AUTO: 1.42 K/UL — LOW (ref 1.8–7.4)
NEUTROPHILS NFR BLD AUTO: 40.3 % — LOW (ref 43–77)
NRBC # BLD: 0 /100 WBCS — SIGNIFICANT CHANGE UP (ref 0–0)
NRBC # FLD: 0 K/UL — SIGNIFICANT CHANGE UP (ref 0–0)
PHOSPHATE SERPL-MCNC: 3.2 MG/DL — SIGNIFICANT CHANGE UP (ref 2.5–4.5)
PLATELET # BLD AUTO: 196 K/UL — SIGNIFICANT CHANGE UP (ref 150–400)
POTASSIUM SERPL-MCNC: 4.8 MMOL/L — SIGNIFICANT CHANGE UP (ref 3.5–5.3)
POTASSIUM SERPL-SCNC: 4.8 MMOL/L — SIGNIFICANT CHANGE UP (ref 3.5–5.3)
PROT SERPL-MCNC: 5.8 G/DL — LOW (ref 6–8.3)
RBC # BLD: 4.06 M/UL — LOW (ref 4.2–5.8)
RBC # FLD: 12.5 % — SIGNIFICANT CHANGE UP (ref 10.3–14.5)
SODIUM SERPL-SCNC: 138 MMOL/L — SIGNIFICANT CHANGE UP (ref 135–145)
TACROLIMUS SERPL-MCNC: 14.5 NG/ML — SIGNIFICANT CHANGE UP
WBC # BLD: 3.52 K/UL — LOW (ref 3.8–10.5)
WBC # FLD AUTO: 3.52 K/UL — LOW (ref 3.8–10.5)

## 2024-02-14 PROCEDURE — 99239 HOSP IP/OBS DSCHRG MGMT >30: CPT | Mod: GC

## 2024-02-14 PROCEDURE — 99232 SBSQ HOSP IP/OBS MODERATE 35: CPT

## 2024-02-14 RX ORDER — INSULIN LISPRO 100/ML
8 VIAL (ML) SUBCUTANEOUS
Refills: 0 | DISCHARGE

## 2024-02-14 RX ORDER — INSULIN LISPRO 100/ML
11 VIAL (ML) SUBCUTANEOUS
Qty: 0 | Refills: 0 | DISCHARGE
Start: 2024-02-14

## 2024-02-14 RX ORDER — APIXABAN 2.5 MG/1
1 TABLET, FILM COATED ORAL
Qty: 180 | Refills: 0
Start: 2024-02-14 | End: 2024-05-13

## 2024-02-14 RX ORDER — CEPHALEXIN 500 MG
1 CAPSULE ORAL
Qty: 20 | Refills: 0
Start: 2024-02-14 | End: 2024-02-18

## 2024-02-14 RX ORDER — NIFEDIPINE 30 MG
1 TABLET, EXTENDED RELEASE 24 HR ORAL
Refills: 0 | DISCHARGE

## 2024-02-14 RX ORDER — INSULIN GLARGINE 100 [IU]/ML
21 INJECTION, SOLUTION SUBCUTANEOUS
Qty: 0 | Refills: 0 | DISCHARGE
Start: 2024-02-14

## 2024-02-14 RX ORDER — MYCOPHENOLATE MOFETIL 250 MG/1
1 CAPSULE ORAL
Refills: 0 | DISCHARGE

## 2024-02-14 RX ORDER — APIXABAN 2.5 MG/1
1 TABLET, FILM COATED ORAL
Qty: 120 | Refills: 0
Start: 2024-02-14 | End: 2024-04-13

## 2024-02-14 RX ORDER — METRONIDAZOLE 500 MG
1 TABLET ORAL
Qty: 10 | Refills: 0
Start: 2024-02-14 | End: 2024-02-18

## 2024-02-14 RX ORDER — LISINOPRIL 2.5 MG/1
1 TABLET ORAL
Refills: 0 | DISCHARGE

## 2024-02-14 RX ORDER — INSULIN GLARGINE 100 [IU]/ML
18 INJECTION, SOLUTION SUBCUTANEOUS
Refills: 0 | DISCHARGE

## 2024-02-14 RX ORDER — CARVEDILOL PHOSPHATE 80 MG/1
1 CAPSULE, EXTENDED RELEASE ORAL
Refills: 0 | DISCHARGE

## 2024-02-14 RX ORDER — PANTOPRAZOLE SODIUM 20 MG/1
1 TABLET, DELAYED RELEASE ORAL
Refills: 0 | DISCHARGE

## 2024-02-14 RX ADMIN — CHLORHEXIDINE GLUCONATE 1 APPLICATION(S): 213 SOLUTION TOPICAL at 12:56

## 2024-02-14 RX ADMIN — Medication 11 UNIT(S): at 09:13

## 2024-02-14 RX ADMIN — Medication 11 UNIT(S): at 13:16

## 2024-02-14 RX ADMIN — PIPERACILLIN AND TAZOBACTAM 25 GRAM(S): 4; .5 INJECTION, POWDER, LYOPHILIZED, FOR SOLUTION INTRAVENOUS at 12:52

## 2024-02-14 RX ADMIN — Medication 1: at 09:12

## 2024-02-14 RX ADMIN — Medication 3: at 13:16

## 2024-02-14 RX ADMIN — ENOXAPARIN SODIUM 60 MILLIGRAM(S): 100 INJECTION SUBCUTANEOUS at 05:42

## 2024-02-14 RX ADMIN — MUPIROCIN 1 APPLICATION(S): 20 OINTMENT TOPICAL at 05:42

## 2024-02-14 RX ADMIN — Medication 5 MILLIGRAM(S): at 05:42

## 2024-02-14 RX ADMIN — PIPERACILLIN AND TAZOBACTAM 25 GRAM(S): 4; .5 INJECTION, POWDER, LYOPHILIZED, FOR SOLUTION INTRAVENOUS at 05:41

## 2024-02-14 RX ADMIN — TACROLIMUS 2 MILLIGRAM(S): 5 CAPSULE ORAL at 05:41

## 2024-02-14 RX ADMIN — INSULIN GLARGINE 21 UNIT(S): 100 INJECTION, SOLUTION SUBCUTANEOUS at 09:11

## 2024-02-14 NOTE — PROGRESS NOTE ADULT - PROBLEM SELECTOR PLAN 5
Home Meds: Nifedipine 30, Coreg 12.5 BID, Lisinopril 5  - normotensive inpatient  - resume home meds if pt becomes hypertensive, defer restarting to renal

## 2024-02-14 NOTE — PROGRESS NOTE ADULT - PROBLEM SELECTOR PLAN 4
not on HD.  Hydronephrosis on CT  Home Meds: Pred 5, Mycophenolate 500 BID, Tacrolimus 2 BID  Renala sono 2/12 without overt hydronephrosis.   - transplant renal recs appreciated  - monitor SCr,   - monitor I/Os  - hold cellcept i/s/o infection  - C/w Tacro 2 BID  - F/u tacro levels prior to AM doses  - discuss w renal tacro dosing    - c/w prednisone 5mg qd  - f/u BK virus level since reportedly had + in december, this will dictate when can reintroduce cellcept.

## 2024-02-14 NOTE — DISCHARGE NOTE NURSING/CASE MANAGEMENT/SOCIAL WORK - PATIENT PORTAL LINK FT
You can access the FollowMyHealth Patient Portal offered by [facility] by registering at the following website: [email or web address]. By joining [email or web address]’s FollowMyHealth portal, you will also be able to view your health information using other applications (apps) compatible with our system.

## 2024-02-14 NOTE — PROGRESS NOTE ADULT - PROVIDER SPECIALTY LIST ADULT
Endocrinology
Internal Medicine
MICU
MICU
Nephrology
Infectious Disease
Nephrology
Endocrinology
Nephrology
Endocrinology
Internal Medicine
Internal Medicine

## 2024-02-14 NOTE — PROGRESS NOTE ADULT - ASSESSMENT
38M with HTN, hx ESRD on HD via LUE AVF for 13 years until renal transplant 3 years ago at [facility].  Post-transplant developed DM.  Now admitted 2/9 with rectal pain and found be in DKA.    WBC 3.52  Creatinine 0.94  UA (-)  UC (-)  CT with perianal 2.1 x 1.5 x 2.6 abscess cx +MSSA    Overall, 38M with renal transplant (on prednisone / cellcept) here with perirectal abscess in DKA with leukocytosis s/p I+D, also with DVT  - zosyn D#5  - can transition to keflex 500mg q6 with flagyl 500mg q12 for another 5 days  - glycemic control    I have discussed plan of care as detailed above with PA

## 2024-02-14 NOTE — PROGRESS NOTE ADULT - PROBLEM/PLAN-5
DISPLAY PLAN FREE TEXT
DISPLAY PLAN FREE TEXT
Skin normal color for race, warm, dry and intact. No evidence of rash.
DISPLAY PLAN FREE TEXT

## 2024-02-14 NOTE — PROGRESS NOTE ADULT - ASSESSMENT
Mr. Montes is a 38 year old male with a PMHx of T2DM, renal transplant who presents for perineal lesion. Endocrinology consulted for management of DKA.    Poorly controlled T2DM with hyperglycemia  DKA  - HbA1c: 12.2  - Home Regimen:  Lantus 18 units at bedtime, Humalog 8 units with meals, previously on a pill (does not remember the name)  - Endocrinologist: Dr. Merino  - presented with , AG 28, bicarb 8, pH 7.23, UA >160 consistent with DKA  - patients home regimen is significantly above weight based regimen and reports -400s at home and presented in [place] adamant he has been compliant    Inpatient plan   - FS goal 100-180 mg/dl   - on Prednisone 5mg for renal transplant   - fasting glucose at goal   - continue Lantus 21 units daily in the morning at 8 am. DO NOT HOLD IF NPO   - Given DKA, check zinc transporter8, IA2 antibody, NAZ 65 (specimen received awaiting results)  - continue Admelog 11 units before breakfast (please hold if NPO/not eating)  - continue Admelog 11 units before lunch (please hold if NPO/not eating)  - continue Admelog 9 units before dinner (please hold if NPO/not eating)  - continue low Admelog correctional scale TID AC  - continue separate low Admelog correctional scale at HS   - FS before meals and at bedtime   - carbohydrate consistent diet   - RD consult  - Hypoglycemia Protocol PRN   - Please change abx solution from dextrose to non dextrose solution     Discharge plan:  - Discharge medications:   - Lantus 21 units daily at 8 am   - Humalog 11/11/9 before meals   - Patient to call doctor with persistent high or low BG at home.   - Ensure patient has working glucometer, test strips, lancets, alcohol pads, and BD markell pen needles.   - Please prescribe Glucose tablets 4G (take 4 tablets) or 15G tablets for blood sugar less than 70 mG/dL. Repeat fingerstick in 15 minutes.   - Recommend routine outpatient ophthalmology, podiatry and endocrinology f/u  - Please follow up with Dr. Merino private Endocrinologist Pt has an appointment on 2/19    HTN  - Home regimen: not on medication per chart review  - Can check urine microalbumin outpatient  - Outpatient goal BP <130/80.  - Management per primary team    HLD  - Home regimen: not on statin per chart review  - Would likely benefit from a statin if no contraindication  - Can check lipid profile if not done recently  - management per primary team     KYLE Rm-BC  Nurse Practitioner  Division of Endocrinology & Diabetes  pager [phone]     If before 9AM or after 6PM, or on weekends/holidays, please call endocrine answering service for assistance ([phone]).For nonurgent matters email [email or web address] for assistance.

## 2024-02-14 NOTE — PROGRESS NOTE ADULT - SUBJECTIVE AND OBJECTIVE BOX
Chief Complaint: DM type 2     Interval Events: Patient with good appetite. Fasting glucose at goal. No N/V or abdominal pain.     MEDICATIONS  (STANDING):  chlorhexidine 2% Cloths 1 Application(s) Topical daily  dextrose 5%. 1000 milliLiter(s) (50 mL/Hr) IV Continuous <Continuous>  dextrose 5%. 1000 milliLiter(s) (100 mL/Hr) IV Continuous <Continuous>  dextrose 50% Injectable 25 Gram(s) IV Push once  dextrose 50% Injectable 25 Gram(s) IV Push once  dextrose 50% Injectable 12.5 Gram(s) IV Push once  enoxaparin Injectable 60 milliGRAM(s) SubCutaneous every 12 hours  glucagon  Injectable 1 milliGRAM(s) IntraMuscular once  insulin glargine Injectable (LANTUS) 21 Unit(s) SubCutaneous every morning  insulin lispro (ADMELOG) corrective regimen sliding scale   SubCutaneous three times a day before meals  insulin lispro (ADMELOG) corrective regimen sliding scale   SubCutaneous at bedtime  insulin lispro Injectable (ADMELOG) 11 Unit(s) SubCutaneous before breakfast  insulin lispro Injectable (ADMELOG) 11 Unit(s) SubCutaneous before lunch  insulin lispro Injectable (ADMELOG) 9 Unit(s) SubCutaneous before dinner  mupirocin 2% Ointment 1 Application(s) Topical every 12 hours  piperacillin/tazobactam IVPB.. 3.375 Gram(s) IV Intermittent every 8 hours  polyethylene glycol 3350 17 Gram(s) Oral daily  predniSONE   Tablet 5 milliGRAM(s) Oral daily  senna 2 Tablet(s) Oral at bedtime  tacrolimus 2 milliGRAM(s) Oral two times a day    MEDICATIONS  (PRN):  acetaminophen     Tablet .. 650 milliGRAM(s) Oral every 6 hours PRN Temp greater or equal to 38C (100.4F), Mild Pain (1 - 3)  dextrose Oral Gel 15 Gram(s) Oral once PRN Blood Glucose LESS THAN 70 milliGRAM(s)/deciliter      Allergies  penicillin (Hives; Angioedema)      Review of Systems:  Constitutional: No fever/chills   Eyes: No blurry vision  Neuro: No tremors  HEENT: No pain  Cardiovascular: No chest pain, no palpitations  Respiratory: No SOB, no cough  GI: No nausea, vomiting or abdominal pain  : No dysuria  Endocrine: no polyuria, polydipsia      VITALS: T(C): 36.7 (02-14-24 @ 12:11)  T(F): 98 (02-14-24 @ 12:11), Max: 98.1 (02-13-24 @ 21:31)  HR: 72 (02-14-24 @ 12:11) (66 - 74)  BP: 118/82 (02-14-24 @ 12:11) (102/64 - 118/86)  RR:  (18 - 20)  SpO2:  (97% - 100%)      Physical Exam:   GENERAL: NAD, well-groomed, well-developed  EYES: No proptosis, no lid lag, anicteric  HEENT:  Atraumatic, Normocephalic, moist mucous membranes  RESPIRATORY: non labored breathing   GI: Soft, nontender, non distended  MUSCULOSKELETAL: Full range of motion, normal strength  NEURO: extraocular movements intact, no tremor  PSYCH: Alert and oriented x 3, normal affect, normal mood    CAPILLARY BLOOD GLUCOSE  POCT Blood Glucose.: 258 mg/dL (14 Feb 2024 12:24)  POCT Blood Glucose.: 181 mg/dL (14 Feb 2024 08:31)  POCT Blood Glucose.: 216 mg/dL (13 Feb 2024 21:45)  POCT Blood Glucose.: 188 mg/dL (13 Feb 2024 17:53)      02-14    138  |  103  |  14  ----------------------------<  168<H>  4.8   |  27  |  0.94    eGFR: 107    Ca    9.9      02-14  Mg     1.60     02-14  Phos  3.2     02-14    TPro  5.8<L>  /  Alb  2.9<L>  /  TBili  0.5  /  DBili  x   /  AST  159<H>  /  ALT  188<H>  /  AlkPhos  66  02-14      A1C with Estimated Average Glucose Result: 12.2 % (02-10-24 @ 03:30)

## 2024-02-14 NOTE — PROGRESS NOTE ADULT - PROBLEM SELECTOR PROBLEM 2
Immunosuppression
DKA (diabetic ketoacidosis)
DVT, lower extremity
Immunosuppression
Immunosuppression
DVT, lower extremity
DKA (diabetic ketoacidosis)
DKA (diabetic ketoacidosis)
DVT, lower extremity

## 2024-02-14 NOTE — PROGRESS NOTE ADULT - PROBLEM SELECTOR PROBLEM 4
HLD (hyperlipidemia)
HLD (hyperlipidemia)
Renal transplant recipient
HLD (hyperlipidemia)
Renal transplant recipient
Renal transplant recipient

## 2024-02-14 NOTE — DISCHARGE NOTE PROVIDER - NSDCCPCAREPLAN_GEN_ALL_CORE_FT
PRINCIPAL DISCHARGE DIAGNOSIS  Diagnosis: DKA (diabetic ketoacidosis)  Assessment and Plan of Treatment: You came to the hospital and were found to have DKA likely from your infection. You wre treated on an insulin drip in the MICU and improved. Please see your endocrinologist after discharge, please take insulin as instructed.      SECONDARY DISCHARGE DIAGNOSES  Diagnosis: Acute DVT (deep venous thrombosis)  Assessment and Plan of Treatment: You were found to have a blood clot in the veins of your right leg. You were placed on blood thinners for this, and were prescribed eliquis, please take this medication as instructed. Please follow up with a hematologist after discharge (they should be reaching out to you to make an appointment). Please seek medical attention if you become short of breath or your leg becomes extremely painful and red.    Diagnosis: HTN (hypertension)  Assessment and Plan of Treatment: Your blood pressure medications were stopped in the hospital, please STOP taking this as outlined in the medication section, until you meet with your doctors after discharge.    Diagnosis: Poorly controlled type 2 diabetes mellitus  Assessment and Plan of Treatment: Please take your diabetes medication as instructed. Please follow up with your primary doctors and endocrinologist Dr. Merino.    Diagnosis: Renal transplant recipient  Assessment and Plan of Treatment: Your cellcept (mycophenolate) was stopped in the hospital. Please STOP taking this medication until told otherwise by your transplant doctors. You can continue taking your prednisone and tacrolimus as directed. Please follow with nephrology within 1 week to have a blood test taken to determine if your tacrolimus dose should be adjusted. Please also follow up with them regarding the result of the BK virus test that was sent in the hospital.    Diagnosis: Perianal abscess  Assessment and Plan of Treatment: You had a perianal abscess that was drained by surgery. Please keep the area covered with gauze for now and follow up with Dr. Perez from colorectal surgery after discharge.

## 2024-02-14 NOTE — PROGRESS NOTE ADULT - PROBLEM SELECTOR PLAN 3
Arrived with DKA, now resolved, Likely exacerbated by infection, as patient reports good insulin adherence, though A1C is > 12.   Home med: Insulin 18U Nightly and 8U pre-meals.   - inpatient: 21 bedtime 11 breakfast 11 lunch 9 dinner  - f/u endocrine recs  - will f/u w Dr. Wilber boyle on 2/19
Serum K low at 3.4 today. Please replete and monitor labs.    Final recommendations pending attending signature.    If you have any questions, please feel free to contact me  Inder Wilkinson  Nephrology Fellow  [employer]/Page [phone]  (After 5pm or on weekends please page the on-call fellow)
Arrived with DKA, now resolved, Likely exacerbated by infection, as patient reports good insulin adherence, though A1C is > 12.   Home med: Insulin 18U Nightly and 8U pre-meals.   - Lantus 21 units qhs, Ademlog 9 U premeal   - f/u endocrine recs
Serum K  at 3.9 today.
Arrived with DKA, now resolved, Likely exacerbated by infection, as patient reports good insulin adherence, though A1C is > 12.   Home med: Insulin 18U Nightly and 8U pre-meals.   - Lantus 21 units qhs, Ademlog 9 U premeal   - f/u endocrine recs
Universal Safety Interventions

## 2024-02-14 NOTE — CHART NOTE - NSCHARTNOTEFT_GEN_A_CORE
Patient currently on lovenox 60mg bid-> Negative APLS labs ( lupus anticoagulant, anticardiolipin and antibeta2 glycoprotein)->  can transition from heparin to eliquis- will need AC for at least 3 months  in the setting of provoked DVT   When patient is ready for dc, pls reach out to heme team for help with follow up arrangement.  Heme team will sign off now.

## 2024-02-14 NOTE — PROGRESS NOTE ADULT - PROBLEM SELECTOR PLAN 6
Diet: CC diet  DVT PPx: on full AC for DVT  Dispo: Infection tx, DM optimization, needs DOAC approval prior to DC (or lovenox) for DVT.
Diet: CC diet  DVT PPx: on full AC for DVT  Dispo: Needs DOAC approval prior to DC (or lovenox) for DVT.    Followups on Discharge:   Heme f/u  Transplant f/u  Renal f/u  Endo f/u 2/19 Dr. Merino  Surgery f/u wound
Diet: CC diet  DVT PPx: on full AC for DVT  Dispo: Infection tx, DM optimization, needs DOAC approval prior to DC (or lovenox) for DVT.

## 2024-02-14 NOTE — DISCHARGE NOTE PROVIDER - CARE PROVIDER_API CALL
O[facility] [place]  [facility]  [address] [place] [address]  Phone: [phone]  Fax: [phone]  Established Patient  Follow Up Time: 1 week    Jose Perez  Colon/Rectal Surgery  [address] [place] [address] [address]  [place] [address]  Phone: [phone]  Fax: [phone]  Follow Up Time: 2 weeks

## 2024-02-14 NOTE — PROGRESS NOTE ADULT - PROBLEM SELECTOR PLAN 2
Patient with RLE extensive DVT above and below knee  Unclear cause  Protein S, C activity low  - Lovenox 60 BID (full AC)  - will start DOAC, need to ensure covered by insurance  - APLS negative  - heme consulted, can start doac for at last 3 mo,   - f/u w heme outpatient  - sent price check for eliquis to [facility]

## 2024-02-14 NOTE — PROGRESS NOTE ADULT - PROBLEM SELECTOR PLAN 1
s/p I&D By surgery  Growing MSSA from abscess culture.   UCx: negative  MRSA swab negative  -BCx negative  - c/w zosyn (2/10 - )  - f/u ID recs
Pt. with kidney transplantation in 2020 ([facility], follows with Dr. Kaminski) currently admitted for perirectal abscess and DKA. SCr stable at 0.66. CT abd/pel with "hydronephrosis of transplanted kidney". Recommend Renal US to further clarify. Monitor labs and urine output. Avoid any potential nephrotoxins. Dose medications as per eGFR.
Pt. with kidney transplantation in 2020 ([facility], follows with Dr. Kaminski) currently admitted for perirectal abscess and DKA. Labs and VS reviewed. Pt hemodynamically stable. On admission Scr WNL at 1.09. Scr WNL at 0.73 today. CT abd/pel with "hydronephrosis of transplanted kidney". S/p Renal US--> no hydronephrosis   Monitor labs and urine output. Avoid any potential nephrotoxins. Dose medications as per eGFR.
Pt. with kidney transplantation in 2020 ([facility], follows with Dr. Kaminski) currently admitted for perirectal abscess and DKA. Labs and VS reviewed. Pt hemodynamically stable. On admission Scr WNL at 1.09. Scr WNL at 0.73 today. CT abd/pel with "hydronephrosis of transplanted kidney". Recommend Renal US to further clarify. Monitor labs and urine output. Avoid any potential nephrotoxins. Dose medications as per eGFR.
s/p I&D By surgery  Growing MSSA from abscess culture.   UCx: negative  MRSA swab negative  -BCx NGTD  - c/w zosyn (2/10 - )  - f/u ID recs  - packing removed 2/13 OK per surgery.   - f/.u surgery after discharge
s/p I&D By surgery  Growing MSSA from abscess culture.   UCx: negative  MRSA swab negative  -BCx NGTD  - c/w zosyn (2/10 - )  - f/u ID recs

## 2024-02-14 NOTE — DISCHARGE NOTE PROVIDER - CARE PROVIDERS DIRECT ADDRESSES
,[email or web address]

## 2024-02-14 NOTE — DISCHARGE NOTE PROVIDER - HOSPITAL COURSE
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    Patient reports has had rectal pain for 4 days or so, has not had bowel movement until day of admission for those 4 days due to pain. Denies fevers, chills, sick contacts. no nausea, vomiting diarrhea. Patient is on insulin at home, says his DM came after his transplant in 12/2019, has not had issues administering his insulin. He also has RLE pain for about the same time, no shortness of breath or chest pain.    In ED patient was given 3L of IV fluids, was briefly started on insulin gtt for DKA. Received Zosyn.   In ED afebrile, hemodynamically table, breathing well on room air.    (10 Feb 2024 14:39)    Hospital Course:  Patient admitted for perianal abscess -- drained by surgery, kept on abc, BCx negative, improved after drainage, cover w gauze.  DKA on arrival- treated in MICU w insulin gtt, corrected quickly, DM managed on floors by endocrine  RLE DVT: on full dose lovenox, improving during admission, no SOB, seen by heme recommend eliquis for 3 months and outpatient f/u.   Transplanted kidney- prednisone and tacro dosed, cellcept held per nephrology, needs nephro f/u.  Patient did well and now medically optimized for discharge.       Important Medication Changes and Reason:  - stopping cellcept (mycophenolate) until seeing nephrology  - abx: keflex and flagyl for 5 more days after discharge for perianal abscess  - NEW eliquis 10mg BID for 7d and then 5 BID for 3 months, need to meet w heme  - Held all antihypertensives since low-normal BP throughout stay.     Active or Pending Issues Requiring Follow-up:  Heme: hypercaog workup and eliquis renewal  Transplant Nephro: Tacro dosing needs level outpatinet, and consider resuming cellcept. To f/u on BK virus test   HTN: Held all antihypertensives since low-normal BP throughout stay.     Advanced Directives:   [X ] Full code  [ ] DNR  [ ] Hospice    Discharge Diagnoses:  Perianal abscess  DKA  RLE DVT

## 2024-02-14 NOTE — PROGRESS NOTE ADULT - PROBLEM SELECTOR PROBLEM 1
Renal transplant recipient
Poorly controlled type 2 diabetes mellitus
Renal transplant recipient
Renal transplant recipient
Perianal abscess

## 2024-02-14 NOTE — DISCHARGE NOTE PROVIDER - NSDCMRMEDTOKEN_GEN_ALL_CORE_FT
Admelog 100 units/mL injectable solution: 8 unit(s) injectable 3 times a day (before meals)  cephalexin 500 mg oral tablet: 1 tab(s) orally every 6 hours  Eliquis 5 mg oral tablet: 1 tab(s) orally 2 times a day PLEASE TAKE 2 TABS TWICE A DAY FOR 7 DAYS AND THEN 1 TAB TWICE A DAY UNTIL SEEING HEMATOLOGIST  Lantus 100 units/mL subcutaneous solution: 18 unit(s) subcutaneous once a day (at bedtime)  metroNIDAZOLE 500 mg oral tablet: 1 tab(s) orally 2 times a day  predniSONE 5 mg oral tablet: 1 tab(s) orally once a day  tacrolimus 1 mg oral capsule: 2 cap(s) orally 2 times a day

## 2024-02-14 NOTE — PROGRESS NOTE ADULT - PROBLEM SELECTOR PROBLEM 3
HTN (hypertension)
HTN (hypertension)
Hypokalemia
HTN (hypertension)
Hypokalemia
Poorly controlled type 2 diabetes mellitus

## 2024-02-14 NOTE — DISCHARGE NOTE PROVIDER - ATTENDING DISCHARGE PHYSICAL EXAMINATION:
.  VITAL SIGNS:  T(C): 36.7 (02-14-24 @ 12:11), Max: 36.7 (02-13-24 @ 21:31)  T(F): 98 (02-14-24 @ 12:11), Max: 98.1 (02-13-24 @ 21:31)  HR: 72 (02-14-24 @ 12:11) (66 - 74)  BP: 118/82 (02-14-24 @ 12:11) (102/64 - 118/86)  BP(mean): --  RR: 18 (02-14-24 @ 12:11) (18 - 20)  SpO2: 100% (02-14-24 @ 12:11) (97% - 100%)  Wt(kg): --    PHYSICAL EXAM:    Constitutional: WDWN resting comfortably in bed;  Head: NC/AT  Eyes: PERRL, EOMI, clear conjunctiva  ENT: no nasal discharge; uvula midline, no oropharyngeal erythema or exudates;   Neck: supple;   Respiratory: CTA B/L; no W/R/R, no retractions  Cardiac: +S1/S2; RRR;   Gastrointestinal: soft, NT/ND; no rebound or guarding; +BSx4  Back: spine midline, no bony tenderness or step-offs; no CVAT B/L  Extremities: WWP, no clubbing or cyanosis; no peripheral edema  Skin - perineal wound   Neurologic: AAOx3; CNII-XII grossly intact; no focal deficits  Psychiatric: affect and characteristics of appearance, verbalizations, behaviors are appropriate

## 2024-02-14 NOTE — DISCHARGE NOTE PROVIDER - NSDCFUADDAPPT_GEN_ALL_CORE_FT
APPTS ARE READY TO BE MADE: [ ] YES    Best Family or Patient Contact (if needed):    Additional Information about above appointments (if needed):    1: Transplant Nephrology (to get a level drawn for tacrolimus and adjust dosing, within 1 week if possible)  2: Hematology  3: Surgery (for follow up after abscess drainage) Dr. Campbell  4. Endocrine (Private, Dr. Merino has appt on 2/19)  5. PCP    Other comments or requests:

## 2024-02-14 NOTE — DISCHARGE NOTE PROVIDER - PROVIDER TOKENS
PROVIDER:[TOKEN:[[id number]],FOLLOWUP:[1 week],ESTABLISHEDPATIENT:[T]],PROVIDER:[TOKEN:[[id number]],FOLLOWUP:[2 weeks]]

## 2024-02-14 NOTE — DISCHARGE NOTE PROVIDER - NSDCCPTREATMENT_GEN_ALL_CORE_FT
PRINCIPAL PROCEDURE  Procedure: CT abdomen  Findings and Treatment: Perianal abscess measuring 2.1 x 1.5 x 2.6 cm, seen at the anal verge  7 mm cystic lesion of the pancreatic head. One year follow-up advised,   preferably with MRI.  Right pelvic renal transplant with hydronephrosis.  Probable constipation.  Question gastritis versus underdistention.        SECONDARY PROCEDURE  Procedure: US kidney transplant  Findings and Treatment:   IMPRESSION:  Fullness of the renal pelvis and calyces in the right lower quadrant   transplanted kidney without overt hydronephrosis.

## 2024-02-14 NOTE — DISCHARGE NOTE PROVIDER - NSFOLLOWUPCLINICS_GEN_ALL_ED_FT
[facility]  Hematology/Oncology  [address] [id number]  Phone: [phone]  Fax:

## 2024-02-14 NOTE — PROGRESS NOTE ADULT - SUBJECTIVE AND OBJECTIVE BOX
Patient is a 38y old  Male who presents with a chief complaint of DKA (10 Feb 2024 17:23)    f/u perirectal abscess    Interval History/ROS:  no fever/chills.  DKA on insulin drip.  pain rectally better.   no abdominal pain.  no dysuria.  Remainder of ROS otherwise negative.    PAST MEDICAL & SURGICAL HISTORY:  DM (diabetes mellitus)  S/P kidney transplant  Hx ESRD was previously on HD via LUE AVF for 13 years until transplant    Allergies  penicillin (Hives; Angioedema)    ANTIMICROBIALS:    vancomycin  IVPB 1000 every 12 hours (2/10-2/12)    active;  piperacillin/tazobactam IVPB.. 3.375 every 8 hours (2/10-)    MEDICATIONS  (STANDING):  enoxaparin Injectable 60 every 12 hours  insulin glargine Injectable (LANTUS) 21 every morning  insulin lispro (ADMELOG) corrective regimen sliding scale  at bedtime  insulin lispro (ADMELOG) corrective regimen sliding scale  three times a day before meals  insulin lispro Injectable (ADMELOG) 11 before breakfast  insulin lispro Injectable (ADMELOG) 11 before lunch  insulin lispro Injectable (ADMELOG) 9 before dinner  polyethylene glycol 3350 17 daily  predniSONE   Tablet 5 daily  senna 2 at bedtime  tacrolimus 2 two times a day    Vital Signs Last 24 Hrs  T(F): 98.1 (02-14-24 @ 09:30), Max: 98.1 (02-13-24 @ 21:31)  HR: 74 (02-14-24 @ 09:30)  BP: 118/86 (02-14-24 @ 09:30)  RR: 18 (02-14-24 @ 09:30)  SpO2: 97% (02-14-24 @ 09:30) (97% - 100%)    PHYSICAL EXAM:  Constitutional: non-toxic, no distress  HEAD/EYES: anicteric  ENT:  supple  Cardiovascular:   normal S1, S2  Respiratory:  clear BS bilaterally  GI:  soft, perirectal collection nearly resolved, drain out  :  no richard  Musculoskeletal:  no synovitis  Neurologic: awake and alert, normal strength, no focal findings  Skin:  no rash  Psychiatric:  awake, alert, appropriate mood                               13.2   3.52  )-----------( 196      ( 14 Feb 2024 06:05 )             38.1 02-14    138  |  103  |  14  ----------------------------<  168  4.8   |  27  |  0.94  Ca    9.9      14 Feb 2024 06:05Phos  3.2     02-14Mg     1.60     02-14  TPro  5.8  /  Alb  2.9  /  TBili  0.5  /  DBili  x   /  AST  159  /  ALT  188  /  AlkPhos  66  02-14    Urinalysis + Microscopic Examination (02.10.24 @ 03:10)   pH Urine: 6.0  Urine Appearance: Clear  Color: Yellow  Specific Gravity: 1.038  Protein, Urine: Trace mg/dL  Glucose Qualitative, Urine: >=1000 mg/dL  Ketone - Urine: >=160 mg/dL  Blood, Urine: Negative  Bilirubin: Negative  Urobilinogen: 0.2 mg/dL  Leukocyte Esterase Concentration: Negative  Nitrite: Negative  White Blood Cell - Urine: 0 /HPF  Red Blood Cell - Urine: 0 /HPF  Bacteria: Negative /HPF  Cast: 0 /LPF  Epithelial Cells: 0 /HPF  Urinalysis Basic - ( 10 Feb 2024 10:30 )    MICROBIOLOGY:  Culture - Abscess with Gram Stain (collected 02-10-24 @ 11:45)  Source: .Abscess perianal abscess  Gram Stain (02-10-24 @ 18:12):    Few polymorphonuclear leukocytes per low power field    Few Gram positive cocci in pairs per oil power field  Preliminary Report (02-11-24 @ 15:45):    Numerous Staphylococcus aureus  Organism: Staphylococcus aureus (02-12-24 @ 10:18)  Organism: Staphylococcus aureus (02-12-24 @ 10:18)      Method Type: ROHIT      -  Ampicillin/Sulbactam: S <=8/4      -  Cefazolin: S <=4      -  Clindamycin: S 0.5      -  Erythromycin: S <=0.25      -  Gentamicin: S <=1 Should not be used as monotherapy      -  Oxacillin: S 0.5 Oxacillin predicts susceptibility for dicloxacillin, methicillin, and nafcillin      -  Penicillin: R >8      -  Rifampin: S <=1 Should not be used as monotherapy      -  Tetracycline: S <=1      -  Trimethoprim/Sulfamethoxazole: S <=0.5/9.5      -  Vancomycin: S 2    Culture - Urine (collected 02-10-24 @ 03:10)  Source: Clean Catch Clean Catch (Midstream)  Final Report (02-11-24 @ 11:34):    No growth    RADIOLOGY:  imaging below personally reviewed and agree with findings    CT Abdomen and Pelvis w/ IV Cont (02.10.24 @ 06:18) >  IMPRESSION:  Perianal abscess measuring 2.1 x 1.5 x 2.6 cm, seen at the anal verge.  7 mm cystic lesion of the pancreatic head. One year follow-up advised, preferably with MRI.  Right pelvic renal transplant with hydronephrosis.  Probable constipation.  Question gastritis versus underdistention.    US Duplex Venous Lower Ext Complete, Bilateral (02.10.24 @ 05:51) >  Acute deep venous thrombosis: above and below the knee.

## 2024-02-14 NOTE — PROGRESS NOTE ADULT - SUBJECTIVE AND OBJECTIVE BOX
Subjective:    INTERVAL HPI/OVERNIGHT EVENTS:   No acute events overnight  Afebrile, hemodynamically stable   - doing well, packing removed from perirectal wound site, confirmed this plan w surgery  - tacro level 14, will confirm if reliable w renal and consider dose adjustments  - hematology recommendations appreciated, OK for DOAC. Sent price check for eliquis to [facility]      Telemetry:    T(F): 97.9 (02-14-24 @ 05:33), Max: 98.1 (02-13-24 @ 21:31)  HR: 66 (02-14-24 @ 05:33) (66 - 74)  BP: 102/64 (02-14-24 @ 05:33) (102/64 - 109/81)  RR: 18 (02-14-24 @ 05:33) (16 - 20)  SpO2: 98% (02-14-24 @ 05:33) (98% - 100%)  I&O's Summary        Medications:  acetaminophen     Tablet .. 650 milliGRAM(s) Oral every 6 hours PRN  chlorhexidine 2% Cloths 1 Application(s) Topical daily  dextrose 5%. 1000 milliLiter(s) (50 mL/Hr) IV Continuous <Continuous>  dextrose 5%. 1000 milliLiter(s) (100 mL/Hr) IV Continuous <Continuous>  dextrose 50% Injectable 12.5 Gram(s) IV Push once  dextrose 50% Injectable 25 Gram(s) IV Push once  dextrose 50% Injectable 25 Gram(s) IV Push once  dextrose Oral Gel 15 Gram(s) Oral once PRN  enoxaparin Injectable 60 milliGRAM(s) SubCutaneous every 12 hours  glucagon  Injectable 1 milliGRAM(s) IntraMuscular once  insulin glargine Injectable (LANTUS) 21 Unit(s) SubCutaneous every morning  insulin lispro (ADMELOG) corrective regimen sliding scale   SubCutaneous at bedtime  insulin lispro (ADMELOG) corrective regimen sliding scale   SubCutaneous three times a day before meals  insulin lispro Injectable (ADMELOG) 11 Unit(s) SubCutaneous before breakfast  insulin lispro Injectable (ADMELOG) 11 Unit(s) SubCutaneous before lunch  insulin lispro Injectable (ADMELOG) 9 Unit(s) SubCutaneous before dinner  mupirocin 2% Ointment 1 Application(s) Topical every 12 hours  piperacillin/tazobactam IVPB.. 3.375 Gram(s) IV Intermittent every 8 hours  polyethylene glycol 3350 17 Gram(s) Oral daily  predniSONE   Tablet 5 milliGRAM(s) Oral daily  senna 2 Tablet(s) Oral at bedtime  tacrolimus 2 milliGRAM(s) Oral two times a day      PHYSICAL EXAM:  GENERAL: NAD, well-groomed, well-developed  HEAD:  Atraumatic, Normocephalic  EYES: EOMI, PERRLA, conjunctiva and sclera clear  ENMT:  Moist mucous membranes  NECK: No JVD  CHEST/LUNG: Clear to auscultation bilaterally; No rales, rhonchi, wheezing, or rubs  HEART: Regular rate and rhythm; No murmurs, rubs, or gallops  ABDOMEN: Soft, Nontender, Nondistended; Bowel sounds present  VASCULAR:  No clubbing, cyanosis, or edema  LYMPH: No lymphadenopathy noted  SKIN: No rashes or lesions  NERVOUS SYSTEM:  Alert & Oriented X3     Notable Labs:    Labs:                          13.2   3.52  )-----------( 196      ( 14 Feb 2024 06:05 )             38.1     02-14    138  |  103  |  14  ----------------------------<  168<H>  4.8   |  27  |  0.94    Ca    9.9      14 Feb 2024 06:05  Phos  3.2     02-14  Mg     1.60     02-14    TPro  5.8<L>  /  Alb  2.9<L>  /  TBili  0.5  /  DBili  x   /  AST  159<H>  /  ALT  188<H>  /  AlkPhos  66  02-14    LIVER FUNCTIONS - ( 14 Feb 2024 06:05 )  Alb: 2.9 g/dL / Pro: 5.8 g/dL / ALK PHOS: 66 U/L / ALT: 188 U/L / AST: 159 U/L / GGT: x           PT/INR - ( 13 Feb 2024 06:32 )   PT: 12.2 sec;   INR: 1.08 ratio         PTT - ( 13 Feb 2024 06:32 )  PTT:34.0 sec  CAPILLARY BLOOD GLUCOSE      POCT Blood Glucose.: 216 mg/dL (13 Feb 2024 21:45)  POCT Blood Glucose.: 188 mg/dL (13 Feb 2024 17:53)  POCT Blood Glucose.: 283 mg/dL (13 Feb 2024 12:23)  POCT Blood Glucose.: 216 mg/dL (13 Feb 2024 08:26)            Urinalysis Basic - ( 14 Feb 2024 06:05 )    Color: x / Appearance: x / SG: x / pH: x  Gluc: 168 mg/dL / Ketone: x  / Bili: x / Urobili: x   Blood: x / Protein: x / Nitrite: x   Leuk Esterase: x / RBC: x / WBC x   Sq Epi: x / Non Sq Epi: x / Bacteria: x        Micro:    Culture - Blood (collected 02-12-24 @ 13:20)  Source: .Blood Blood-Peripheral  Preliminary Report (02-13-24 @ 19:01):    No growth at 24 hours    Culture - Blood (collected 02-12-24 @ 13:05)  Source: .Blood Blood-Peripheral  Preliminary Report (02-13-24 @ 19:01):    No growth at 24 hours        RADIOLOGY & ADDITIONAL TESTS:    nni

## 2024-02-15 PROBLEM — Z00.00 ENCOUNTER FOR PREVENTIVE HEALTH EXAMINATION: Status: ACTIVE | Noted: 2024-02-15

## 2024-02-15 LAB
BKV DNA SPEC QL NAA+PROBE: SIGNIFICANT CHANGE UP
CULTURE RESULTS: ABNORMAL
JCPYV DNA SPEC QL NAA+PROBE: SIGNIFICANT CHANGE UP
ORGANISM # SPEC MICROSCOPIC CNT: ABNORMAL
ORGANISM # SPEC MICROSCOPIC CNT: ABNORMAL
SPECIMEN SOURCE: SIGNIFICANT CHANGE UP
SPECIMEN SOURCE: SIGNIFICANT CHANGE UP

## 2024-02-16 LAB
GAD65 AB SER-MCNC: 0 NMOL/L — SIGNIFICANT CHANGE UP
ISLET CELL512 AB SER-SCNC: 0 NMOL/L — SIGNIFICANT CHANGE UP

## 2024-02-17 LAB
CULTURE RESULTS: SIGNIFICANT CHANGE UP
CULTURE RESULTS: SIGNIFICANT CHANGE UP
SPECIMEN SOURCE: SIGNIFICANT CHANGE UP
SPECIMEN SOURCE: SIGNIFICANT CHANGE UP

## 2024-02-18 LAB — ZINC TRANSPORTER 8 AB, RESULT: <15 U/ML — SIGNIFICANT CHANGE UP

## 2024-04-03 ENCOUNTER — APPOINTMENT (OUTPATIENT)
Dept: NEPHROLOGY | Facility: CLINIC | Age: 39
End: 2024-04-03
Payer: MEDICAID

## 2024-04-03 VITALS
BODY MASS INDEX: 24.91 KG/M2 | OXYGEN SATURATION: 97 % | HEART RATE: 75 BPM | HEIGHT: 66 IN | DIASTOLIC BLOOD PRESSURE: 89 MMHG | WEIGHT: 155 LBS | TEMPERATURE: 98.2 F | SYSTOLIC BLOOD PRESSURE: 136 MMHG

## 2024-04-03 DIAGNOSIS — E78.5 HYPERLIPIDEMIA, UNSPECIFIED: ICD-10-CM

## 2024-04-03 PROCEDURE — 99205 OFFICE O/P NEW HI 60 MIN: CPT

## 2024-04-03 NOTE — HISTORY OF PRESENT ILLNESS
[FreeTextEntry1] : 39 years old Citizen of [place] male, came to [place] at age 10.  Patient is here for transplant consultation. He has primary nephrologist Dr. Cipriano Kaminski and PMD. He received DDRT in 2020 December 5th at [facility]. He was on HD for 7 years prior to that. 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 Ruby. He had right nephrectomy 1 month prior to transplant for small kidney cancer, was done at [facility]. No heart disease/CVA/CAD Reports he used to be treated for hepatitis, ?B. Recent hospitalization for rectal abcess.  Lives alone. Has a girlfriend, Riana. Has no children. His family- mother lives in [place], brother in [place] Brother- Candelario Talley is his next of kin. Non smoker. He drives [employer].     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  Patient reports has had rectal pain for 4 days or so, has not had bowel movement until day of admission for those 4 days due to pain. Denies fevers, chills, sick contacts. no nausea, vomiting diarrhea. Patient is on insulin at home, says his DM came after his transplant in 12/2019, has not had issues administering his insulin. He also has RLE pain for about the same time, no shortness of breath or chest pain. In ED patient was given 3L of IV fluids, was briefly started on insulin gtt for DKA. Received Zosyn. In ED afebrile, hemodynamically table, breathing well on room air.   (10 Feb 2024 14:39)    Hospital Course: Patient admitted for perianal abscess -- drained by surgery, kept on abc, BCx negative, improved after drainage, cover w gauze. DKA on arrival- treated in MICU w insulin gtt, corrected quickly, DM managed on floors by endocrine RLE DVT: on full dose lovenox, improving during admission, no SOB, seen by heme recommend eliquis for 3 months and outpatient f/u. Transplanted kidney- prednisone and tacro dosed, cellcept held per nephrology, needs nephro f/u. Patient did well and now medically optimized for discharge.   Important Medication Changes and Reason: - stopping cellcept (mycophenolate) until seeing nephrology - abx: keflex and flagyl for 5 more days after discharge for perianal abscess - NEW eliquis 10mg BID for 7d and then 5 BID for 3 months, need to meet w heme - Held all antihypertensives since low-normal BP throughout stay. Active or Pending Issues Requiring Follow-up: Heme: hypercaog workup and eliquis renewal Transplant Nephro: Tacro dosing needs level outpatinet, and consider resuming cellcept. To f/u on BK virus test HTN: Held all antihypertensives since low-normal BP throughout stay.     Discharge Diagnoses: Perianal abscess DKA RLE DVT  Discharge Medications  Admelog 100 units/mL injectable solution: 8 unit(s) injectable 3 times a day (before meals) cephalexin 500 mg oral tablet: 1 tab(s) orally every 6 hours Eliquis 5 mg oral tablet: 1 tab(s) orally 2 times a day PLEASE TAKE 2 TABS TWICE A DAY FOR 7 DAYS AND THEN 1 TAB TWICE A DAY UNTIL SEEING HEMATOLOGIST Lantus 100 units/mL subcutaneous solution: 18 unit(s) subcutaneous once a day (at bedtime) metroNIDAZOLE 500 mg oral tablet: 1 tab(s) orally 2 times a day predniSONE 5 mg oral tablet: 1 tab(s) orally once a day tacrolimus 1 mg oral capsule: 2 cap(s) orally 2 times a day  Currently taking: Cellcept 500 mg 2 tab AM and 1 tab PM (Restarted after hospitalization) Tacrolimus 1 mg AM and 1 mg PM (Has been on this dose prior to hospitalization) Prednisone 5 mg/d Eliquis 5 mg BID (Had DVT right leg), now swelling has improved Vitamin D 50 K units/week Fenofibrate 145 mg/d  Pharmacy: [facility] Phone no: [phone] He has no acute symptoms.

## 2024-04-03 NOTE — PHYSICAL EXAM
[General Appearance - Alert] : alert [Sclera] : the sclera and conjunctiva were normal [General Appearance - In No Acute Distress] : in no acute distress [Outer Ear] : the ears and nose were normal in appearance [Jugular Venous Distention Increased] : there was no jugular-venous distention [Auscultation Breath Sounds / Voice Sounds] : lungs were clear to auscultation bilaterally [Heart Sounds Gallop] : no gallops [Heart Sounds Pericardial Friction Rub] : no pericardial rub [Edema] : there was no peripheral edema [Abdomen Soft] : soft [Abdomen Tenderness] : non-tender [Cervical Lymph Nodes Enlarged Posterior Bilaterally] : posterior cervical [Cervical Lymph Nodes Enlarged Anterior Bilaterally] : anterior cervical [Involuntary Movements] : no involuntary movements were seen [___ (cm) Fistula] : [unfilled] (cm) fistula [] : no rash [No Focal Deficits] : no focal deficits [Oriented To Time, Place, And Person] : oriented to person, place, and time [Impaired Insight] : insight and judgment were intact

## 2024-04-03 NOTE — ASSESSMENT
[FreeTextEntry1] : Renal Transplant recipient: Noted allograft function,  Reviewed for urinary symptoms/fever/chills/pain/new symptoms. Tolerating medications. Lab data from last visit reviewed including allograft function, urinalysis, any viremia and trough level of medication as well as any imaging reports. Immunosuppression: reviewed; Noted induction regimen, maintenance regimen and reviewed target trough level. Hyperlipidemia: Reviewed medication regimen/lifestyle modification for maintaining target lipid levels. Patient has met with transplant surgeon post transplant; post op wound care ureteral stent removal and follow up care were discussed. Advised to bring flow sheets and medication list at every visit. Discussed ophthalmology and dermatology checks at least yearly and vaccinations. Flu vaccine yearly and Pneumonia vaccine every 5 years. Copy of office visit and lab reports are being sent to primary physician and referring nephrologist.

## 2024-04-11 LAB
ALBUMIN SERPL ELPH-MCNC: 4.3 G/DL
ALP BLD-CCNC: 158 U/L
ALT SERPL-CCNC: 1034 U/L
ANION GAP SERPL CALC-SCNC: 11 MMOL/L
APPEARANCE: CLEAR
AST SERPL-CCNC: 691 U/L
BILIRUB SERPL-MCNC: 0.5 MG/DL
BILIRUBIN URINE: NEGATIVE
BKV DNA SPEC QL NAA+PROBE: NOT DETECTED IU/ML
BLOOD URINE: NEGATIVE
BUN SERPL-MCNC: 21 MG/DL
CALCIUM SERPL-MCNC: 10.8 MG/DL
CHLORIDE SERPL-SCNC: 100 MMOL/L
CHOLEST SERPL-MCNC: 181 MG/DL
CMV DNA SPEC QL NAA+PROBE: NOT DETECTED IU/ML
CMVPCR LOG: NOT DETECTED LOG10IU/ML
CO2 SERPL-SCNC: 27 MMOL/L
COLOR: YELLOW
CREAT SERPL-MCNC: 0.99 MG/DL
CREAT SPEC-SCNC: 39 MG/DL
CREAT SPEC-SCNC: 39 MG/DL
CREAT/PROT UR: 0.1 RATIO
EGFR: 100 ML/MIN/1.73M2
ESTIMATED AVERAGE GLUCOSE: 237 MG/DL
GLUCOSE QUALITATIVE U: >=1000 MG/DL
GLUCOSE SERPL-MCNC: 218 MG/DL
HBA1C MFR BLD HPLC: 9.9 %
HBV SURFACE AB SER QL: NONREACTIVE
HBV SURFACE AG SER QL: REACTIVE
HCT VFR BLD CALC: 53.2 %
HCV AB SER QL: NONREACTIVE
HCV S/CO RATIO: 0.19 S/CO
HDLC SERPL-MCNC: 67 MG/DL
HGB BLD-MCNC: 16.9 G/DL
KETONES URINE: NEGATIVE MG/DL
LDH SERPL-CCNC: 352 U/L
LDLC SERPL CALC-MCNC: 97 MG/DL
LEUKOCYTE ESTERASE URINE: NEGATIVE
MAGNESIUM SERPL-MCNC: 2 MG/DL
MCHC RBC-ENTMCNC: 31.8 GM/DL
MCHC RBC-ENTMCNC: 34 PG
MCV RBC AUTO: 107 FL
MICROALBUMIN 24H UR DL<=1MG/L-MCNC: <1.2 MG/DL
MICROALBUMIN/CREAT 24H UR-RTO: NORMAL MG/G
NITRITE URINE: NEGATIVE
NONHDLC SERPL-MCNC: 114 MG/DL
PH URINE: 6.5
PHOSPHATE SERPL-MCNC: 2.9 MG/DL
PLATELET # BLD AUTO: 206 K/UL
POTASSIUM SERPL-SCNC: 4.7 MMOL/L
PROT SERPL-MCNC: 7.6 G/DL
PROT UR-MCNC: 5 MG/DL
PROTEIN URINE: NEGATIVE MG/DL
RBC # BLD: 4.97 M/UL
RBC # FLD: 14.2 %
SODIUM SERPL-SCNC: 138 MMOL/L
SPECIFIC GRAVITY URINE: 1.02
TACROLIMUS SERPL-MCNC: 5.3 NG/ML
TRIGL SERPL-MCNC: 94 MG/DL
URATE SERPL-MCNC: 5.4 MG/DL
UROBILINOGEN URINE: 0.2 MG/DL
WBC # FLD AUTO: 5.38 K/UL

## 2024-05-07 ENCOUNTER — LABORATORY RESULT (OUTPATIENT)
Age: 39
End: 2024-05-07

## 2024-05-07 ENCOUNTER — APPOINTMENT (OUTPATIENT)
Dept: HEPATOLOGY | Facility: CLINIC | Age: 39
End: 2024-05-07
Payer: MEDICAID

## 2024-05-07 VITALS
HEART RATE: 75 BPM | OXYGEN SATURATION: 97 % | WEIGHT: 167 LBS | BODY MASS INDEX: 26.84 KG/M2 | SYSTOLIC BLOOD PRESSURE: 125 MMHG | DIASTOLIC BLOOD PRESSURE: 81 MMHG | TEMPERATURE: 98.1 F | HEIGHT: 66 IN

## 2024-05-07 PROCEDURE — 99214 OFFICE O/P EST MOD 30 MIN: CPT

## 2024-05-07 RX ORDER — TENOFOVIR DISOPROXIL FUMARATE 300 MG/1
300 TABLET ORAL DAILY
Qty: 30 | Refills: 5 | Status: ACTIVE | COMMUNITY
Start: 2024-05-07 | End: 1900-01-01

## 2024-05-20 ENCOUNTER — APPOINTMENT (OUTPATIENT)
Dept: ULTRASOUND IMAGING | Facility: CLINIC | Age: 39
End: 2024-05-20
Payer: MEDICAID

## 2024-05-20 ENCOUNTER — NON-APPOINTMENT (OUTPATIENT)
Age: 39
End: 2024-05-20

## 2024-05-20 PROCEDURE — 76700 US EXAM ABDOM COMPLETE: CPT

## 2024-05-30 ENCOUNTER — NON-APPOINTMENT (OUTPATIENT)
Age: 39
End: 2024-05-30

## 2024-05-30 LAB
ALBUMIN SERPL ELPH-MCNC: 4.1 G/DL
ALP BLD-CCNC: 153 U/L
ALT SERPL-CCNC: 593 U/L
ANION GAP SERPL CALC-SCNC: 11 MMOL/L
AST SERPL-CCNC: 301 U/L
BASOPHILS # BLD AUTO: 0.01 K/UL
BASOPHILS NFR BLD AUTO: 0.2 %
BILIRUB SERPL-MCNC: 0.4 MG/DL
BUN SERPL-MCNC: 25 MG/DL
CALCIUM SERPL-MCNC: 11.1 MG/DL
CHLORIDE SERPL-SCNC: 101 MMOL/L
CO2 SERPL-SCNC: 21 MMOL/L
CREAT SERPL-MCNC: 1.04 MG/DL
EGFR: 94 ML/MIN/1.73M2
EOSINOPHIL # BLD AUTO: 0.07 K/UL
EOSINOPHIL NFR BLD AUTO: 1.4 %
GLUCOSE SERPL-MCNC: 235 MG/DL
HBV DNA # SERPL NAA+PROBE: NORMAL IU/ML
HBV E AB SER QL: NONREACTIVE
HBV E AG SER QL: REACTIVE
HBV SURFACE AG SER QL: REACTIVE
HCT VFR BLD CALC: 55.8 %
HDV AB SER IA-ACNC: NEGATIVE
HEPB DNA PCR INT: DETECTED
HEPB DNA PCR LOG: 8.65 LOGIU/ML
HGB BLD-MCNC: 18.4 G/DL
IMM GRANULOCYTES NFR BLD AUTO: 0.6 %
INR PPP: 1.08 RATIO
LYMPHOCYTES # BLD AUTO: 1.03 K/UL
LYMPHOCYTES NFR BLD AUTO: 20.3 %
MAN DIFF?: NORMAL
MCHC RBC-ENTMCNC: 33 GM/DL
MCHC RBC-ENTMCNC: 33.6 PG
MCV RBC AUTO: 102 FL
MONOCYTES # BLD AUTO: 0.62 K/UL
MONOCYTES NFR BLD AUTO: 12.2 %
NEUTROPHILS # BLD AUTO: 3.31 K/UL
NEUTROPHILS NFR BLD AUTO: 65.3 %
PLATELET # BLD AUTO: 183 K/UL
POTASSIUM SERPL-SCNC: 4.7 MMOL/L
PROT SERPL-MCNC: 7.7 G/DL
PT BLD: 12.2 SEC
RBC # BLD: 5.47 M/UL
RBC # FLD: 12.6 %
SODIUM SERPL-SCNC: 133 MMOL/L
TACROLIMUS SERPL-MCNC: 5.7 NG/ML
TACROLIMUS SERPL-MCNC: 6.1 NG/ML
WBC # FLD AUTO: 5.07 K/UL

## 2024-05-31 LAB
ALBUMIN SERPL ELPH-MCNC: 4.2 G/DL
ALP BLD-CCNC: 130 U/L
ALT SERPL-CCNC: 413 U/L
ANION GAP SERPL CALC-SCNC: 11 MMOL/L
AST SERPL-CCNC: 202 U/L
BILIRUB SERPL-MCNC: 0.6 MG/DL
BUN SERPL-MCNC: 25 MG/DL
CALCIUM SERPL-MCNC: 10.5 MG/DL
CHLORIDE SERPL-SCNC: 103 MMOL/L
CO2 SERPL-SCNC: 21 MMOL/L
CREAT SERPL-MCNC: 1.02 MG/DL
EGFR: 96 ML/MIN/1.73M2
GLUCOSE SERPL-MCNC: 331 MG/DL
INR PPP: 1.07 RATIO
MAGNESIUM SERPL-MCNC: 2.1 MG/DL
PHOSPHATE SERPL-MCNC: 2.2 MG/DL
POTASSIUM SERPL-SCNC: 4.3 MMOL/L
PROT SERPL-MCNC: 7.6 G/DL
PT BLD: 12.1 SEC
SODIUM SERPL-SCNC: 135 MMOL/L

## 2024-06-03 ENCOUNTER — NON-APPOINTMENT (OUTPATIENT)
Age: 39
End: 2024-06-03

## 2024-06-03 LAB
APPEARANCE: CLEAR
BASOPHILS # BLD AUTO: 0.01 K/UL
BASOPHILS NFR BLD AUTO: 0.2 %
BILIRUBIN URINE: NEGATIVE
BLOOD URINE: NEGATIVE
COLOR: YELLOW
CREAT SPEC-SCNC: 59 MG/DL
CREAT/PROT UR: 0.2 RATIO
EOSINOPHIL # BLD AUTO: 0.03 K/UL
EOSINOPHIL NFR BLD AUTO: 0.6 %
GLUCOSE QUALITATIVE U: >=1000 MG/DL
HBV DNA # SERPL NAA+PROBE: NORMAL IU/ML
HCT VFR BLD CALC: 56.6 %
HEPB DNA PCR INT: DETECTED
HEPB DNA PCR LOG: 5.85 LOGIU/ML
HGB BLD-MCNC: 19.4 G/DL
IMM GRANULOCYTES NFR BLD AUTO: 0.6 %
KETONES URINE: NEGATIVE MG/DL
LEUKOCYTE ESTERASE URINE: NEGATIVE
LYMPHOCYTES # BLD AUTO: 1.12 K/UL
LYMPHOCYTES NFR BLD AUTO: 21.7 %
MAN DIFF?: NORMAL
MCHC RBC-ENTMCNC: 32.7 PG
MCHC RBC-ENTMCNC: 34.3 GM/DL
MCV RBC AUTO: 95.4 FL
MONOCYTES # BLD AUTO: 0.58 K/UL
MONOCYTES NFR BLD AUTO: 11.2 %
NEUTROPHILS # BLD AUTO: 3.4 K/UL
NEUTROPHILS NFR BLD AUTO: 65.7 %
NITRITE URINE: NEGATIVE
PH URINE: 7
PLATELET # BLD AUTO: 162 K/UL
PROT UR-MCNC: 12 MG/DL
PROTEIN URINE: NEGATIVE MG/DL
RBC # BLD: 5.93 M/UL
RBC # FLD: 12.2 %
SPECIFIC GRAVITY URINE: >1.03
TACROLIMUS SERPL-MCNC: 5.3 NG/ML
UROBILINOGEN URINE: 0.2 MG/DL
WBC # FLD AUTO: 5.17 K/UL

## 2024-06-04 ENCOUNTER — APPOINTMENT (OUTPATIENT)
Dept: HEPATOLOGY | Facility: CLINIC | Age: 39
End: 2024-06-04
Payer: MEDICAID

## 2024-06-04 VITALS
HEIGHT: 66 IN | SYSTOLIC BLOOD PRESSURE: 105 MMHG | BODY MASS INDEX: 26.03 KG/M2 | OXYGEN SATURATION: 98 % | DIASTOLIC BLOOD PRESSURE: 71 MMHG | WEIGHT: 162 LBS | HEART RATE: 74 BPM | TEMPERATURE: 97.9 F

## 2024-06-04 DIAGNOSIS — Z79.899 OTHER LONG TERM (CURRENT) DRUG THERAPY: ICD-10-CM

## 2024-06-04 DIAGNOSIS — Z94.0 OTHER LONG TERM (CURRENT) DRUG THERAPY: ICD-10-CM

## 2024-06-04 LAB
ALPHA-1-FETOPROTEIN-L3: 62.6 %
ALPHA-1-FETOPROTEIN: 11 NG/ML
BKV DNA SPEC QL NAA+PROBE: NOT DETECTED IU/ML

## 2024-06-04 PROCEDURE — 99214 OFFICE O/P EST MOD 30 MIN: CPT

## 2024-06-04 PROCEDURE — 76981 USE PARENCHYMA: CPT

## 2024-06-05 LAB — ACARBOXYPROTHROMBIN SERPL-MCNC: 0.2 NG/ML

## 2024-06-19 ENCOUNTER — APPOINTMENT (OUTPATIENT)
Dept: MRI IMAGING | Facility: CLINIC | Age: 39
End: 2024-06-19
Payer: MEDICAID

## 2024-06-19 PROCEDURE — A9585: CPT

## 2024-06-19 PROCEDURE — 74183 MRI ABD W/O CNTR FLWD CNTR: CPT

## 2024-06-28 LAB
ALBUMIN SERPL ELPH-MCNC: 4.1 G/DL
ALP BLD-CCNC: 115 U/L
ALT SERPL-CCNC: 262 U/L
ANION GAP SERPL CALC-SCNC: 11 MMOL/L
AST SERPL-CCNC: 160 U/L
BASOPHILS # BLD AUTO: 0.01 K/UL
BASOPHILS NFR BLD AUTO: 0.2 %
BILIRUB SERPL-MCNC: 0.5 MG/DL
BUN SERPL-MCNC: 27 MG/DL
CALCIUM SERPL-MCNC: 10.4 MG/DL
CHLORIDE SERPL-SCNC: 101 MMOL/L
CO2 SERPL-SCNC: 22 MMOL/L
CREAT SERPL-MCNC: 1.06 MG/DL
EGFR: 92 ML/MIN/1.73M2
EOSINOPHIL # BLD AUTO: 0.05 K/UL
EOSINOPHIL NFR BLD AUTO: 1.1 %
GLUCOSE SERPL-MCNC: 394 MG/DL
HBV DNA # SERPL NAA+PROBE: NORMAL IU/ML
HCT VFR BLD CALC: 55.6 %
HEPB DNA PCR INT: DETECTED
HEPB DNA PCR LOG: 5.01 LOGIU/ML
HGB BLD-MCNC: 19.1 G/DL
IMM GRANULOCYTES NFR BLD AUTO: 0.2 %
INR PPP: 1.13 RATIO
LYMPHOCYTES # BLD AUTO: 1.16 K/UL
LYMPHOCYTES NFR BLD AUTO: 24.5 %
MAN DIFF?: NORMAL
MCHC RBC-ENTMCNC: 33.4 PG
MCHC RBC-ENTMCNC: 34.4 GM/DL
MCV RBC AUTO: 97.2 FL
MONOCYTES # BLD AUTO: 0.59 K/UL
MONOCYTES NFR BLD AUTO: 12.5 %
NEUTROPHILS # BLD AUTO: 2.91 K/UL
NEUTROPHILS NFR BLD AUTO: 61.5 %
PLATELET # BLD AUTO: 144 K/UL
POTASSIUM SERPL-SCNC: 4.7 MMOL/L
PROT SERPL-MCNC: 6.8 G/DL
PT BLD: 12.7 SEC
RBC # BLD: 5.72 M/UL
RBC # FLD: 12.6 %
SODIUM SERPL-SCNC: 134 MMOL/L
WBC # FLD AUTO: 4.73 K/UL

## 2024-07-02 ENCOUNTER — APPOINTMENT (OUTPATIENT)
Dept: HEPATOLOGY | Facility: CLINIC | Age: 39
End: 2024-07-02
Payer: MEDICAID

## 2024-07-02 VITALS
DIASTOLIC BLOOD PRESSURE: 85 MMHG | SYSTOLIC BLOOD PRESSURE: 128 MMHG | HEART RATE: 74 BPM | OXYGEN SATURATION: 97 % | HEIGHT: 66 IN | RESPIRATION RATE: 16 BRPM | BODY MASS INDEX: 27.32 KG/M2 | TEMPERATURE: 98.6 F | WEIGHT: 170 LBS

## 2024-07-02 DIAGNOSIS — Z94.0 KIDNEY TRANSPLANT STATUS: ICD-10-CM

## 2024-07-02 DIAGNOSIS — B18.1 CHRONIC VIRAL HEPATITIS B W/OUT DELTA-AGENT: ICD-10-CM

## 2024-07-02 PROCEDURE — 99214 OFFICE O/P EST MOD 30 MIN: CPT

## 2024-07-03 LAB
ALBUMIN SERPL ELPH-MCNC: 4.3 G/DL
ALP BLD-CCNC: 113 U/L
ALT SERPL-CCNC: 322 U/L
ANION GAP SERPL CALC-SCNC: 12 MMOL/L
AST SERPL-CCNC: 190 U/L
BILIRUB SERPL-MCNC: 0.5 MG/DL
BUN SERPL-MCNC: 26 MG/DL
CALCIUM SERPL-MCNC: 10.4 MG/DL
CHLORIDE SERPL-SCNC: 102 MMOL/L
CO2 SERPL-SCNC: 22 MMOL/L
CREAT SERPL-MCNC: 1.1 MG/DL
EGFR: 88 ML/MIN/1.73M2
FOLATE SERPL-MCNC: 14.1 NG/ML
GLUCOSE SERPL-MCNC: 267 MG/DL
HAPTOGLOB SERPL-MCNC: <20 MG/DL
HBV DNA # SERPL NAA+PROBE: NORMAL IU/ML
HEPB DNA PCR INT: DETECTED
HEPB DNA PCR LOG: 4.45 LOGIU/ML
IRON SATN MFR SERPL: 57 %
IRON SERPL-MCNC: 162 UG/DL
LDH SERPL-CCNC: 238 U/L
POTASSIUM SERPL-SCNC: 5 MMOL/L
PROT SERPL-MCNC: 7.2 G/DL
RBC # BLD: 5.95 M/UL
RETICS # AUTO: 2.8 %
RETICS AGGREG/RBC NFR: 164.2 K/UL
SODIUM SERPL-SCNC: 135 MMOL/L
TIBC SERPL-MCNC: 283 UG/DL
UIBC SERPL-MCNC: 121 UG/DL
VIT B12 SERPL-MCNC: 1146 PG/ML

## 2024-07-05 LAB — TM INTERPRETATION: NORMAL
